# Patient Record
Sex: MALE | Race: WHITE | NOT HISPANIC OR LATINO | Employment: OTHER | URBAN - METROPOLITAN AREA
[De-identification: names, ages, dates, MRNs, and addresses within clinical notes are randomized per-mention and may not be internally consistent; named-entity substitution may affect disease eponyms.]

---

## 2019-12-06 ENCOUNTER — HOSPITAL ENCOUNTER (INPATIENT)
Facility: HOSPITAL | Age: 59
LOS: 11 days | Discharge: NURSING, REHAB OR LONG-TERM CARE | DRG: 023 | End: 2019-12-17
Attending: EMERGENCY MEDICINE | Admitting: ANESTHESIOLOGY
Payer: MEDICAID

## 2019-12-06 DIAGNOSIS — I63.9 STROKE: ICD-10-CM

## 2019-12-06 DIAGNOSIS — J39.2 OROPHARYNGEAL MASS: Primary | ICD-10-CM

## 2019-12-06 DIAGNOSIS — I63.40 EMBOLIC CEREBRAL INFARCTION: ICD-10-CM

## 2019-12-06 DIAGNOSIS — I63.412 EMBOLIC STROKE INVOLVING LEFT MIDDLE CEREBRAL ARTERY: ICD-10-CM

## 2019-12-06 PROBLEM — G93.6 CYTOTOXIC CEREBRAL EDEMA: Status: ACTIVE | Noted: 2019-12-06

## 2019-12-06 PROBLEM — E78.2 MIXED HYPERLIPIDEMIA: Status: ACTIVE | Noted: 2019-12-06

## 2019-12-06 PROBLEM — R47.01 APHASIA: Status: ACTIVE | Noted: 2019-12-06

## 2019-12-06 LAB
ALBUMIN SERPL BCP-MCNC: 3.5 G/DL (ref 3.5–5.2)
ALP SERPL-CCNC: 62 U/L (ref 55–135)
ALT SERPL W/O P-5'-P-CCNC: 12 U/L (ref 10–44)
ANION GAP SERPL CALC-SCNC: 9 MMOL/L (ref 8–16)
ASCENDING AORTA: 3.06 CM
AST SERPL-CCNC: 17 U/L (ref 10–40)
AV INDEX (PROSTH): 0.64
AV MEAN GRADIENT: 20 MMHG
AV PEAK GRADIENT: 35 MMHG
AV VALVE AREA: 2.1 CM2
AV VELOCITY RATIO: 0.55
BASOPHILS # BLD AUTO: 0.06 K/UL (ref 0–0.2)
BASOPHILS NFR BLD: 0.5 % (ref 0–1.9)
BILIRUB SERPL-MCNC: 0.6 MG/DL (ref 0.1–1)
BUN SERPL-MCNC: 15 MG/DL (ref 6–20)
BUN SERPL-MCNC: 17 MG/DL (ref 6–30)
CALCIUM SERPL-MCNC: 9.1 MG/DL (ref 8.7–10.5)
CHLORIDE SERPL-SCNC: 103 MMOL/L (ref 95–110)
CHLORIDE SERPL-SCNC: 107 MMOL/L (ref 95–110)
CHOLEST SERPL-MCNC: 218 MG/DL (ref 120–199)
CHOLEST/HDLC SERPL: 4.1 {RATIO} (ref 2–5)
CO2 SERPL-SCNC: 24 MMOL/L (ref 23–29)
CREAT SERPL-MCNC: 0.9 MG/DL (ref 0.5–1.4)
CREAT SERPL-MCNC: 0.9 MG/DL (ref 0.5–1.4)
CV ECHO LV RWT: 0.37 CM
DIFFERENTIAL METHOD: ABNORMAL
DOP CALC AO PEAK VEL: 2.94 M/S
DOP CALC AO VTI: 60.74 CM
DOP CALC LVOT AREA: 3.3 CM2
DOP CALC LVOT DIAMETER: 2.04 CM
DOP CALC LVOT PEAK VEL: 1.63 M/S
DOP CALC LVOT STROKE VOLUME: 127.67 CM3
DOP CALCLVOT PEAK VEL VTI: 39.08 CM
E WAVE DECELERATION TIME: 280.68 MSEC
E/A RATIO: 0.87
E/E' RATIO: 11.2 M/S
ECHO LV POSTERIOR WALL: 1 CM (ref 0.6–1.1)
EOSINOPHIL # BLD AUTO: 0 K/UL (ref 0–0.5)
EOSINOPHIL NFR BLD: 0.2 % (ref 0–8)
ERYTHROCYTE [DISTWIDTH] IN BLOOD BY AUTOMATED COUNT: 13.9 % (ref 11.5–14.5)
EST. GFR  (AFRICAN AMERICAN): >60 ML/MIN/1.73 M^2
EST. GFR  (NON AFRICAN AMERICAN): >60 ML/MIN/1.73 M^2
ESTIMATED AVG GLUCOSE: 117 MG/DL (ref 68–131)
FRACTIONAL SHORTENING: 11 % (ref 28–44)
GLUCOSE SERPL-MCNC: 107 MG/DL (ref 70–110)
GLUCOSE SERPL-MCNC: 113 MG/DL (ref 70–110)
HBA1C MFR BLD HPLC: 5.7 % (ref 4–5.6)
HCT VFR BLD AUTO: 40.3 % (ref 40–54)
HCT VFR BLD CALC: 40 %PCV (ref 36–54)
HDLC SERPL-MCNC: 53 MG/DL (ref 40–75)
HDLC SERPL: 24.3 % (ref 20–50)
HGB BLD-MCNC: 13.7 G/DL (ref 14–18)
IMM GRANULOCYTES # BLD AUTO: 0.06 K/UL (ref 0–0.04)
IMM GRANULOCYTES NFR BLD AUTO: 0.5 % (ref 0–0.5)
INR PPP: 0.9 (ref 0.8–1.2)
INTERVENTRICULAR SEPTUM: 1.1 CM (ref 0.6–1.1)
LA MAJOR: 6.19 CM
LA MINOR: 4.36 CM
LA WIDTH: 5.07 CM
LDLC SERPL CALC-MCNC: 144 MG/DL (ref 63–159)
LEFT ATRIUM SIZE: 4.09 CM
LEFT ATRIUM VOLUME: 90.18 CM3
LEFT INTERNAL DIMENSION IN SYSTOLE: 4.8 CM (ref 2.1–4)
LEFT VENTRICLE DIASTOLIC VOLUME: 132.21 ML
LEFT VENTRICLE SYSTOLIC VOLUME: 54.99 ML
LEFT VENTRICULAR INTERNAL DIMENSION IN DIASTOLE: 5.4 CM (ref 3.5–6)
LEFT VENTRICULAR MASS: 220.59 G
LV LATERAL E/E' RATIO: 10.5 M/S
LV SEPTAL E/E' RATIO: 12 M/S
LYMPHOCYTES # BLD AUTO: 2.4 K/UL (ref 1–4.8)
LYMPHOCYTES NFR BLD: 19.2 % (ref 18–48)
MCH RBC QN AUTO: 30.9 PG (ref 27–31)
MCHC RBC AUTO-ENTMCNC: 34 G/DL (ref 32–36)
MCV RBC AUTO: 91 FL (ref 82–98)
MONOCYTES # BLD AUTO: 0.5 K/UL (ref 0.3–1)
MONOCYTES NFR BLD: 4 % (ref 4–15)
MV PEAK A VEL: 0.97 M/S
MV PEAK E VEL: 0.84 M/S
NEUTROPHILS # BLD AUTO: 9.4 K/UL (ref 1.8–7.7)
NEUTROPHILS NFR BLD: 75.6 % (ref 38–73)
NONHDLC SERPL-MCNC: 165 MG/DL
NRBC BLD-RTO: 0 /100 WBC
PLATELET # BLD AUTO: 250 K/UL (ref 150–350)
PMV BLD AUTO: 10.2 FL (ref 9.2–12.9)
POC IONIZED CALCIUM: 1.11 MMOL/L (ref 1.06–1.42)
POC TCO2 (MEASURED): 26 MMOL/L (ref 23–29)
POCT GLUCOSE: 102 MG/DL (ref 70–110)
POCT GLUCOSE: 103 MG/DL (ref 70–110)
POTASSIUM BLD-SCNC: 4.2 MMOL/L (ref 3.5–5.1)
POTASSIUM SERPL-SCNC: 4.2 MMOL/L (ref 3.5–5.1)
PROT SERPL-MCNC: 7.1 G/DL (ref 6–8.4)
PROTHROMBIN TIME: 9.7 SEC (ref 9–12.5)
RA MAJOR: 5.35 CM
RA PRESSURE: 3 MMHG
RA WIDTH: 3.31 CM
RBC # BLD AUTO: 4.43 M/UL (ref 4.6–6.2)
SAMPLE: ABNORMAL
SINUS: 2.91 CM
SODIUM BLD-SCNC: 139 MMOL/L (ref 136–145)
SODIUM SERPL-SCNC: 140 MMOL/L (ref 136–145)
STJ: 3.18 CM
TDI LATERAL: 0.08 M/S
TDI SEPTAL: 0.07 M/S
TDI: 0.08 M/S
TRICUSPID ANNULAR PLANE SYSTOLIC EXCURSION: 1.93 CM
TRIGL SERPL-MCNC: 105 MG/DL (ref 30–150)
TROPONIN I SERPL DL<=0.01 NG/ML-MCNC: 0.01 NG/ML (ref 0–0.03)
TSH SERPL DL<=0.005 MIU/L-ACNC: 0.98 UIU/ML (ref 0.4–4)
WBC # BLD AUTO: 12.41 K/UL (ref 3.9–12.7)

## 2019-12-06 PROCEDURE — 71000033 HC RECOVERY, INTIAL HOUR: Performed by: PSYCHIATRY & NEUROLOGY

## 2019-12-06 PROCEDURE — 99233 PR SUBSEQUENT HOSPITAL CARE,LEVL III: ICD-10-PCS | Mod: ,,, | Performed by: PSYCHIATRY & NEUROLOGY

## 2019-12-06 PROCEDURE — 25500020 PHARM REV CODE 255: Performed by: EMERGENCY MEDICINE

## 2019-12-06 PROCEDURE — 25000003 PHARM REV CODE 250: Performed by: RADIOLOGY

## 2019-12-06 PROCEDURE — 99291 CRITICAL CARE FIRST HOUR: CPT | Mod: ,,, | Performed by: EMERGENCY MEDICINE

## 2019-12-06 PROCEDURE — 96374 THER/PROPH/DIAG INJ IV PUSH: CPT

## 2019-12-06 PROCEDURE — 99291 CRITICAL CARE FIRST HOUR: CPT | Mod: 25

## 2019-12-06 PROCEDURE — 84443 ASSAY THYROID STIM HORMONE: CPT

## 2019-12-06 PROCEDURE — 82962 GLUCOSE BLOOD TEST: CPT

## 2019-12-06 PROCEDURE — 80053 COMPREHEN METABOLIC PANEL: CPT

## 2019-12-06 PROCEDURE — 80061 LIPID PANEL: CPT

## 2019-12-06 PROCEDURE — 80047 BASIC METABLC PNL IONIZED CA: CPT

## 2019-12-06 PROCEDURE — 99291 PR CRITICAL CARE, E/M 30-74 MINUTES: ICD-10-PCS | Mod: ,,, | Performed by: EMERGENCY MEDICINE

## 2019-12-06 PROCEDURE — 93005 ELECTROCARDIOGRAM TRACING: CPT

## 2019-12-06 PROCEDURE — 94761 N-INVAS EAR/PLS OXIMETRY MLT: CPT

## 2019-12-06 PROCEDURE — 99233 SBSQ HOSP IP/OBS HIGH 50: CPT | Mod: ,,, | Performed by: PSYCHIATRY & NEUROLOGY

## 2019-12-06 PROCEDURE — 84484 ASSAY OF TROPONIN QUANT: CPT

## 2019-12-06 PROCEDURE — 85610 PROTHROMBIN TIME: CPT

## 2019-12-06 PROCEDURE — 99222 1ST HOSP IP/OBS MODERATE 55: CPT | Mod: ,,, | Performed by: PSYCHIATRY & NEUROLOGY

## 2019-12-06 PROCEDURE — 99222 PR INITIAL HOSPITAL CARE,LEVL II: ICD-10-PCS | Mod: ,,, | Performed by: PSYCHIATRY & NEUROLOGY

## 2019-12-06 PROCEDURE — 93010 EKG 12-LEAD: ICD-10-PCS | Mod: ,,, | Performed by: INTERNAL MEDICINE

## 2019-12-06 PROCEDURE — 82962 GLUCOSE BLOOD TEST: CPT | Performed by: PSYCHIATRY & NEUROLOGY

## 2019-12-06 PROCEDURE — 85025 COMPLETE CBC W/AUTO DIFF WBC: CPT

## 2019-12-06 PROCEDURE — 63600175 PHARM REV CODE 636 W HCPCS: Performed by: RADIOLOGY

## 2019-12-06 PROCEDURE — 93010 ELECTROCARDIOGRAM REPORT: CPT | Mod: ,,, | Performed by: INTERNAL MEDICINE

## 2019-12-06 PROCEDURE — 12000002 HC ACUTE/MED SURGE SEMI-PRIVATE ROOM

## 2019-12-06 PROCEDURE — 71000039 HC RECOVERY, EACH ADD'L HOUR: Performed by: PSYCHIATRY & NEUROLOGY

## 2019-12-06 PROCEDURE — 83036 HEMOGLOBIN GLYCOSYLATED A1C: CPT

## 2019-12-06 RX ORDER — SODIUM CHLORIDE 0.9 % (FLUSH) 0.9 %
10 SYRINGE (ML) INJECTION
Status: DISCONTINUED | OUTPATIENT
Start: 2019-12-06 | End: 2019-12-17 | Stop reason: HOSPADM

## 2019-12-06 RX ORDER — POTASSIUM CHLORIDE 7.45 MG/ML
60 INJECTION INTRAVENOUS
Status: DISCONTINUED | OUTPATIENT
Start: 2019-12-06 | End: 2019-12-09

## 2019-12-06 RX ORDER — SODIUM CHLORIDE 0.9 % (FLUSH) 0.9 %
10 SYRINGE (ML) INJECTION
Status: DISCONTINUED | OUTPATIENT
Start: 2019-12-06 | End: 2019-12-06

## 2019-12-06 RX ORDER — NICARDIPINE HYDROCHLORIDE 0.2 MG/ML
5 INJECTION INTRAVENOUS CONTINUOUS
Status: DISCONTINUED | OUTPATIENT
Start: 2019-12-06 | End: 2019-12-08

## 2019-12-06 RX ORDER — FENTANYL CITRATE 50 UG/ML
INJECTION, SOLUTION INTRAMUSCULAR; INTRAVENOUS CODE/TRAUMA/SEDATION MEDICATION
Status: COMPLETED | OUTPATIENT
Start: 2019-12-06 | End: 2019-12-06

## 2019-12-06 RX ORDER — MAGNESIUM SULFATE HEPTAHYDRATE 40 MG/ML
2 INJECTION, SOLUTION INTRAVENOUS
Status: DISCONTINUED | OUTPATIENT
Start: 2019-12-06 | End: 2019-12-09

## 2019-12-06 RX ORDER — LABETALOL HCL 20 MG/4 ML
10 SYRINGE (ML) INTRAVENOUS
Status: DISCONTINUED | OUTPATIENT
Start: 2019-12-06 | End: 2019-12-06

## 2019-12-06 RX ORDER — POTASSIUM CHLORIDE 7.45 MG/ML
40 INJECTION INTRAVENOUS
Status: DISCONTINUED | OUTPATIENT
Start: 2019-12-06 | End: 2019-12-09

## 2019-12-06 RX ORDER — IODIXANOL 320 MG/ML
100 INJECTION, SOLUTION INTRAVASCULAR
Status: COMPLETED | OUTPATIENT
Start: 2019-12-06 | End: 2019-12-06

## 2019-12-06 RX ORDER — MAGNESIUM SULFATE HEPTAHYDRATE 40 MG/ML
4 INJECTION, SOLUTION INTRAVENOUS
Status: DISCONTINUED | OUTPATIENT
Start: 2019-12-06 | End: 2019-12-09

## 2019-12-06 RX ORDER — POTASSIUM CHLORIDE 7.45 MG/ML
80 INJECTION INTRAVENOUS
Status: DISCONTINUED | OUTPATIENT
Start: 2019-12-06 | End: 2019-12-09

## 2019-12-06 RX ORDER — AMOXICILLIN 250 MG
1 CAPSULE ORAL 2 TIMES DAILY
Status: DISCONTINUED | OUTPATIENT
Start: 2019-12-07 | End: 2019-12-17 | Stop reason: HOSPADM

## 2019-12-06 RX ADMIN — NICARDIPINE HYDROCHLORIDE 5 MG/HR: 0.2 INJECTION, SOLUTION INTRAVENOUS at 03:12

## 2019-12-06 RX ADMIN — FENTANYL CITRATE 50 MCG: 50 INJECTION, SOLUTION INTRAMUSCULAR; INTRAVENOUS at 08:12

## 2019-12-06 RX ADMIN — NICARDIPINE HYDROCHLORIDE 5 MG/HR: 0.2 INJECTION, SOLUTION INTRAVENOUS at 11:12

## 2019-12-06 RX ADMIN — IODIXANOL 30 ML: 320 INJECTION, SOLUTION INTRAVASCULAR at 09:12

## 2019-12-06 NOTE — H&P
Inpatient Radiology Pre-procedure Note    History of Present Illness:  Bijan Treadwell is a 60 y.o. male who presents for Left MCA Acute ischemic stroke and CTA showing left M1 occlusion.  Admission H&P reviewed.  No past medical history on file.  No past surgical history on file.    Review of Systems:   As documented in primary team H&P    Home Meds:   Prior to Admission medications    Not on File     Scheduled Meds:   Continuous Infusions:   PRN Meds:  Anticoagulants/Antiplatelets: no anticoagulation    Allergies: Review of patient's allergies indicates:  Allergies not on file  Sedation Hx: have not been any systemic reactions    Labs:  No results for input(s): INR in the last 168 hours.    Invalid input(s):  PT,  PTT    Recent Labs   Lab 12/06/19 0813   HCT 40    No results for input(s): GLU, NA, K, CL, CO2, BUN, CREATININE, CALCIUM, MG, ALT, AST, ALBUMIN, BILITOT, BILIDIR in the last 168 hours.      Vitals:  Pulse: 69 (12/06/19 0821)  Resp: (!) 22 (12/06/19 0803)  BP: (!) 150/82 (12/06/19 0822)  SpO2: 96 % (12/06/19 0821)     Physical Exam:  ASA: 3  Mallampati: 2    Right sided weakness and aphasia     Plan: left MCA M1 thrombectomy   Sedation Plan: Moderate sedation    Chip Maier MD  NeuroIR fellow.

## 2019-12-06 NOTE — PLAN OF CARE
Pt arrived to ir 190 for left MCA M1 thrombectomy . Pt oriented to unit and staff. Plan of care reviewed with patient, patient verbalizes understanding. Comfort measures utilized. Pt safely transferred from stretcher to procedural table. Fall risk reviewed with patient, fall risk interventions maintained. Safety strap applied, positioner pillows utilized to minimize pressure points. Blankets applied. Pt prepped and draped utilizing standard sterile technique. Patient placed on continuous monitoring, as required by sedation policy. Timeouts completed utilizing standard universal time-out, per department and facility policy. RN to remain at bedside, continuous monitoring maintained. Pt resting comfortably. Denies pain/discomfort. Will continue to monitor. See flow sheets for monitoring, medication administration, and updates.

## 2019-12-06 NOTE — HPI
Patient is a 60 year old male with unknown medical history who was admitted for L MCA presented to [place] ED after he fell from a chair at a bar. He was noted to have aphasia and right sided weakness. Workup was significant for L MCA occlusion (per notes) and he was [place] [facility] [place] intervention. Last known normal was 23:30 on 12/5, no tPA was given.. He is s/p thombectomy, TICI 2b. He is being admitted to [facility] for post procedure management.

## 2019-12-06 NOTE — ED TRIAGE NOTES
Coming from [place] with MCA occlusion. LSN last night at 1130pm. Fell off chair at bar after drinking ETOH. Pt with garbled speech, R side weakness, not following commands.

## 2019-12-06 NOTE — H&P
[facility]  Neurocritical Care  History & Physical    Admit Date: 12/6/2019  Service Date: 12/06/2019  Length of Stay: 0    Subjective:     Chief Complaint: Embolic stroke involving left middle cerebral artery    History of Present Illness: Patient is a 60 year old male with unknown medical history who presented to [place] ED after he fell from a chair at a bar. He was noted to have aphasia and right sided weakness. Workup was significant for L MCA occlusion (per notes) and he was transported to [facility] for possible intervention. Last known normal was 23:30 on 12/5, no tPA was given.. He is s/p thombectomy, TICI 2b. He is being admitted to [facility] for post procedure management.     No past medical history on file.  No past surgical history on file.   No current facility-administered medications on file prior to encounter.      No current outpatient medications on file prior to encounter.      Allergies: Patient has no allergy information on record.    No family history on file.  Social History     Tobacco Use    Smoking status: Not on file   Substance Use Topics    Alcohol use: Not on file    Drug use: Not on file     Review of Systems   Unable to perform ROS: Other (Aphasia)     Objective:     Vitals:    Temp: 98 °F (36.7 °C)  Pulse: 76  Rhythm: (P) normal sinus rhythm  BP: 136/71  MAP (mmHg): 90  Resp: 20  SpO2: 97 %  O2 Device (Oxygen Therapy): room air    Temp  Min: 97.8 °F (36.6 °C)  Max: 98 °F (36.7 °C)  Pulse  Min: 65  Max: 82  BP  Min: 116/73  Max: 165/96  MAP (mmHg)  Min: 87  Max: 109  Resp  Min: 13  Max: 22  SpO2  Min: 94 %  Max: 100 %    No intake/output data recorded.   Unmeasured Output  Urine Occurrence: 2       Physical Exam   Constitutional: No distress.   HENT:   Head: Normocephalic.   Eyes: Pupils are equal, round, and reactive to light. EOM are normal. No scleral icterus.   Neck: Neck supple.   Cardiovascular: Normal rate and regular rhythm.   Pulmonary/Chest: He is in  respiratory distress.   Abdominal: Soft. There is no tenderness.   Neurological: He exhibits normal muscle tone. GCS eye subscore is 4. GCS verbal subscore is 3. GCS motor subscore is 6.   Alert.   Motor: LUE 5/5, LLE 5/5. RUE 4/5, RLE 3/5  Expressive aphasia.   Receptive aphasia - follow commands intermittently   Unable to test orientation or sensation due to aphasia.    Skin: He is not diaphoretic.   Nursing note and vitals reviewed.      Assessment/Plan:     Neuro  * Embolic stroke involving left middle cerebral artery  60 year old gentleman with unknown medical history admitted for L MCA occlusion s/p thrombectomy with TICI2b flow.   - Neurochecks q1h  - Vitals q1h  - I/O  - SBP <140  - MRI head within 24 hours post intervention  - TSH wnl  - Hgb A1c pending  - Echo  - PT/OT/SLP         Activity Orders          Diet NPO: NPO starting at 12/06 1114        Full Code    Mirna Celaya MD  Neurocritical Care  [facility]

## 2019-12-06 NOTE — NURSING TRANSFER
PACU  received a phone call of patients' friend (Tae Worley); left their contact information. (Tae Worley; [phone]). RN tried calling the number however was unsuccessful at reaching anyone.

## 2019-12-06 NOTE — SUBJECTIVE & OBJECTIVE
No past medical history on file.  No past surgical history on file.   No current facility-administered medications on file prior to encounter.      No current outpatient medications on file prior to encounter.      Allergies: Patient has no allergy information on record.    No family history on file.  Social History     Tobacco Use    Smoking status: Not on file   Substance Use Topics    Alcohol use: Not on file    Drug use: Not on file     Review of Systems   Unable to perform ROS: Other (Aphasia)     Objective:     Vitals:    Temp: 98 °F (36.7 °C)  Pulse: 76  Rhythm: (P) normal sinus rhythm  BP: 136/71  MAP (mmHg): 90  Resp: 20  SpO2: 97 %  O2 Device (Oxygen Therapy): room air    Temp  Min: 97.8 °F (36.6 °C)  Max: 98 °F (36.7 °C)  Pulse  Min: 65  Max: 82  BP  Min: 116/73  Max: 165/96  MAP (mmHg)  Min: 87  Max: 109  Resp  Min: 13  Max: 22  SpO2  Min: 94 %  Max: 100 %    No intake/output data recorded.   Unmeasured Output  Urine Occurrence: 2       Physical Exam   Constitutional: No distress.   HENT:   Head: Normocephalic.   Eyes: Pupils are equal, round, and reactive to light. EOM are normal. No scleral icterus.   Neck: Neck supple.   Cardiovascular: Normal rate and regular rhythm.   Pulmonary/Chest: He is in respiratory distress.   Abdominal: Soft. There is no tenderness.   Neurological: He exhibits normal muscle tone. GCS eye subscore is 4. GCS verbal subscore is 3. GCS motor subscore is 6.   Alert.   Motor: LUE 5/5, LLE 5/5. RUE 4/5, RLE 3/5  Expressive aphasia.   Receptive aphasia - follow commands intermittently   Unable to test orientation or sensation due to aphasia.    Skin: He is not diaphoretic.   Nursing note and vitals reviewed.

## 2019-12-06 NOTE — ASSESSMENT & PLAN NOTE
60 year old gentleman with unknown medical history admitted for L MCA occlusion s/p thrombectomy with TICI2b flow.   - Neurochecks q1h  - Vitals q1h  - I/O  - SBP <140  - MRI head within 24 hours post intervention  - TSH wnl  - Hgb A1c pending  - Echo  - PT/OT/SLP

## 2019-12-06 NOTE — CODE/ RAPID DOCUMENTATION
RAPID RESPONSE NURSE IR NOTE     Admit Date: 2019  LOS: 0  Code Status: No Order   Date of Consult: 2019  : 1959  Age: 60 y.o.  Weight:   Wt Readings from Last 1 Encounters:   No data found for Wt     Sex: male  Race: Data Unavailable   Bed: RODERICK/RODERICK:   MRN: [id number]  Time Rapid Response Team notified: 0820  Time Rapid Response Team at Bedside: 0835  Time Rapid Response Team left Bedside:   Was the patient discharged from an ICU this admission?   no  Was the patient discharged from a PACU within last 24 hours?  no  Did the patient receive conscious sedation/general anesthesia within last 24 hours?  no  Was the patient in the ED within the past 24 hours?  no  Was the patient started on NIPPV within the past 24 hours?  no  Did this progress into an ARC or CPA:  no  Attending Physician: Raphael Will MD  Primary Service: Networked reference to record PCT   Consult Requested By: Raphael Will MD     SITUATION     Reason for Call: IR thrombectomy    BACKGROUND     Why is the patient in the hospital?: <principal problem not specified>  Patient has no past medical history on file.    ASSESSMENT     Last know well time:     Glucose time: 08   Glucose result: 113    Time Stroke Code initiated:   Stroke team Arrival time:  Stroke Code activation triggers:     Time arrived at CT: 0805  Time CT completed: 818    VAN positive or negative: positive  VAN Test    tPA decision: no  tPA bolus (mg and time):  tPA infusion (mg and time):  tPA end time:    IR decision: yes  IR arrival: 0839  IR end time: 0910    BP parameters: Maintain SBP <140, DBP <95, notify MD for SBP <100    FREQUENT DOCUMENTION     Initial NIH:     Procedure end time: 0910    Post procedure VS, Neuro and groin site checks: Q15m x 2H, Q30min x 6H, then hourly    See flowsheets for further documentation.    FOLLOW-UP/CONTINGENCY PLAN     Call the Rapid Response Nurse, Oscra Pabon RN at [phone] for additional questions or  concerns.    PHYSICIAN ESCALATION     Vascular Neurology provider you spoke with: Dr. Chip Maier    Disposition: Tx to [facility], bed PACU 5.

## 2019-12-06 NOTE — HPI
Patient is a 58 yo male  with unknown past medical history who is being evaluated by the Vascular Neurology service after developing AMS. Pt was LKN at approximately 11:30 pm on 12/5 when he fell from a chair while drinking at a bar. Patient transported to [place]. CTA at OSH revealed L MCA proximal occlusion. [facility] unable to complete intervention. Patient transported by air to [facility] ED for possible intervention. VAN + on arrival. CT aspects ~7.Patient taken to IR and is now s/p successful thrombectomy with TICI 2b flow. Patient monitored for past 24hr in PACU.     Patient aphasic, family not at beside. History gathered from EMS and chart.

## 2019-12-06 NOTE — HOSPITAL COURSE
12/6: s/p thombectomy TICI 2b.   12/07/2019 heme conversion on repeat scan  12/08/2019 NAD overnight. Stable for transfer

## 2019-12-06 NOTE — PROGRESS NOTES
Procedure complete. Patient tolerated well. Manual pressure held to right groin site x 15 minutes. Patient to remain on bedrest x6 hours with hob flat.Dressing clean dry and intact to site. Patient transferred to pacu accompanied per critical care nurse. Report given.

## 2019-12-06 NOTE — PROCEDURES
Neurointerventional Radiology Post-Procedure Note    Pre Op Diagnosis: Left MCA M1 occlusion     Post Op Diagnosis: Left MCA M1 occlusion s/p thrombectomy    Procedure: Cerebral angiogram    Procedure performed by: Esvin CARRERA, Yovanny Maier MD.    Written Informed Consent Obtained: Yes    Specimen Removed: NO    Estimated Blood Loss: less than 50     Procedure report:     A 8F sheath was placed into the right femoral artery and a Neuronmax over Jeff catheter was advanced into the aortic arch.  The left ICA arteries were subselected and angiography of the brain was performed after injection into each of these vessels. NeuronMax 088 over Jet flex 7 and velocity microcatheter and fathom wire for thrombectomy aat left M1.     Preliminary interpretation: Left MCA M1 occlusion treated with aspiration thrombectomy with TICI2b reperfusion .  Please see Imaging report for full details.    Stick time - 8:46 am   First pass - 8:59 am TICI2b    A right femoral artery angiogram was performed, the sheath removed and hemostasis achieved by holding manual pressure.  No hematoma was present at the time of hemostasis.    The patient tolerated the procedure well.       Chip Maier MD  NeuroInterventional Radiology Fellow

## 2019-12-06 NOTE — NURSING
Called to bedside to perform Bubble study. Pt unresponsive.  Patient identified by 2 identifiers on armband, unable to assess allergies, no family available.  20g IV in place to Rt AC, flushed w/ 10cc NS.  Bubble study performed with manual Valsalva & echo images obtained.  IV flushed post bubble.  Pt tolerated procedure well.

## 2019-12-06 NOTE — CONSULTS
[facility]  Vascular Neurology  Comprehensive Stroke Center  Consult Note    Inpatient consult to Vascular (Stroke) Neurology  Consult performed by: Anderson Engle NP  Consult ordered by: Raphael Will MD        Assessment/Plan:     Patient is a 60 y.o. year old male with:    * Embolic stroke involving left middle cerebral artery  60 y.o. yo male with unknown past medical history, who presented to [place] with AMS s/p fall. LKN ~1130 pm pn 12/5. CTA completed at OSH revealed left MCA proximal occlusion. Patient transferred to [facility] for possible intervention. Patient VAN + on arrival to [facility]. CTH with ASPECTS ~7. Patient taken to IR and is now s/p successful thrombectomy with TICI 2b flow. Admitted to [facility] for close monitoring/higher level of care      Antithrombotics for secondary stroke prevention: Antiplatelets: None: hold     Statins for secondary stroke prevention and hyperlipidemia, if present:   Statins: Atorvastatin- 40 mg daily    Aggressive risk factor modification: HLD,       Rehab efforts: The patient has been evaluated by a stroke team provider and the therapy needs have been fully considered based off the presenting complaints and exam findings. The following therapy evaluations are needed: PT evaluate and treat, OT evaluate and treat, SLP evaluate and treat, PM&R evaluate for appropriate placement    Diagnostics ordered/pending: MRI head without contrast to assess brain parenchyma, TTE to assess cardiac function/status , TSH to assess thyroid function    VTE prophylaxis: Heparin 5000 units SQ every 8 hours    BP parameters: Infarct: Post sucessful thrombectomy, SBP <140        Mixed hyperlipidemia  -stroke risk factor         Recommend Atorvastatin 40 mg         STROKE DOCUMENTATION     Acute Stroke Times   Last Known Normal Date: 12/05/19  Last Known Normal Time: 2330  Stroke Team Called Date: 12/06/19  Stroke Team Called Time: 0800  Stroke Team Arrival  Date: 12/06/19  Stroke Team Arrival Time: 0804  CT Interpretation Time: 0810  Decision to Treat Time for Alteplase: (N/A)    NIH Scale:  1a. Level of Consciousness: 0-->Alert, keenly responsive  1b. LOC Questions: 2-->Answers neither question correctly  1c. LOC Commands: 2-->Performs neither task correctly  2. Best Gaze: 1-->Partial gaze palsy, gaze is abnormal in one or both eyes, but forced deviation or total gaze paresis is not present  3. Visual: 0-->No visual loss  4. Facial Palsy: 0-->Normal symmetrical movements  5a. Motor Arm, Left: 0-->No drift, limb holds 90 (or 45) degrees for full 10 secs  5b. Motor Arm, Right: 1-->Drift, limb holds 90 (or 45) degrees, but drifts down before full 10 secs, does not hit bed or other support  6a. Motor Leg, Left: 0-->No drift, leg holds 30 degree position for full 5 secs  6b. Motor Leg, Right: 1-->Drift, leg falls by the end of the 5-sec period but does not hit bed  7. Limb Ataxia: 0-->Absent  8. Sensory: 2-->Severe to total sensory loss, patient is not aware of being touched in the face, arm, and leg  9. Best Language: 2-->Severe aphasia, all communication is through fragmentary expression, great need for inference, questioning, and guessing by the listener. Range of information that can be exchanged is limited, listener carries burden of. . . (see row details)  10. Dysarthria: 2-->Severe dysarthria, patients speech is so slurred as to be unintelligible in the absence of or out of proportion to any dysphasia, or is mute/anarthric  11. Extinction and Inattention (formerly Neglect): 2-->Profound vickey-inattention/extinction more than 1 modality  Total (NIH Stroke Scale): 15    Modified Sharmaine Score: (unknown)  Avery Coma Scale:    ABCD2 Score:    [id number] Score:   HAS -BLED Score:   ICH Score:   Hunt & Cook Classification:       Thrombolysis Candidate? No, Out of window     Delays to Thrombolysis?  No    Interventional Revascularization Candidate?   Is the patient  eligible for mechanical endovascular reperfusion (DIANA)?  Yes      Hemorrhagic change of an Ischemic Stroke: Does this patient have an ischemic stroke with hemorrhagic changes? No     Subjective:     History of Present Illness:  Bijan Arizmendi is a 60 y.o. male with unknown past medical history who is being evaluated by the Vascular Neurology service after developing AMS. Pt was LKN at approximately 11:30 pm on 12/5 when he fell from a chair while drinking at a bar. Patient transported to [place]. CTA at OSH revealed L MCA proximal occlusion. [facility] unable to complete intervention. Patient transported by air to [facility] ED for possible intervention. VAN + on arrival. CT aspects ~7.Patient taken to IR and is now s/p successful thrombectomy with TICI 2b flow. Patient to be admitted to [facility].     Patient aphasic, family not at beside. History gathered from EMS and chart.        No past medical history on file.  No past surgical history on file.  No family history on file.  Social History     Tobacco Use    Smoking status: Not on file   Substance Use Topics    Alcohol use: Not on file    Drug use: Not on file     Review of patient's allergies indicates:  Not on File    Medications: I have reviewed the current medication administration record.      (Not in a hospital admission)    Review of Systems   Constitutional: Positive for activity change.   Eyes: Negative for visual disturbance.   Respiratory: Negative for shortness of breath and wheezing.    Cardiovascular: Negative for chest pain.   Gastrointestinal: Negative for diarrhea, nausea and vomiting.   Skin: Negative for color change and pallor.   Neurological: Positive for speech difficulty, weakness and numbness. Negative for facial asymmetry.   Psychiatric/Behavioral: Positive for confusion and decreased concentration.     Objective:     Vital Signs (Most Recent):  Temp: 97.8 °F (36.6 °C) (12/06/19 0953)  Pulse: 78 (12/06/19 1045)  Resp: 13 (12/06/19  1045)  BP: 128/60 (12/06/19 1045)  SpO2: 95 % (12/06/19 1045)    Vital Signs Range (Last 24H):  Temp:  [97.8 °F (36.6 °C)]   Pulse:  [65-82]   Resp:  [13-22]   BP: (116-165)/(60-96)   SpO2:  [94 %-100 %]     Physical Exam   Constitutional: He appears well-developed and well-nourished.   HENT:   Head: Normocephalic.   Eyes: Pupils are equal, round, and reactive to light.   Left gaze preference    Neck: Full passive range of motion without pain.   Cardiovascular: Normal rate.   Pulmonary/Chest: Effort normal.   Abdominal: Soft.   Musculoskeletal:   Right sided weakness   Skin: Abrasion (right side of head) noted.   Psychiatric: His speech is slurred.       Neurological Exam:   LOC: alert  Attention Span: Good   Language: Expressive aphasia, Receptive aphasia, not following commands consistently, not able to answer simple questions  Articulation: Dysarthria  Orientation: Untestable due to severe aphasia   Visual Fields: Full  EOM (CN III, IV, VI): Gaze preference  left  Pupils (CN II, III): PERRL  Facial Sensation (CN V): Normal  Reflexes: 2+ throughout  Motor: Arm left  Normal 5/5  Leg left  Normal 5/5  Arm right  Paresis: 4/5  Leg right Paresis: 4/5  Cebellar: EVAN- not following commands consistently  Sensation: Tactile extinction to bilateral simultaneous stimulation   Tone: Normal tone throughout      Laboratory:  CMP:   Recent Labs   Lab 12/06/19  0817   CALCIUM 9.1   ALBUMIN 3.5   PROT 7.1      K 4.2   CO2 24      BUN 15   CREATININE 0.9   ALKPHOS 62   ALT 12   AST 17   BILITOT 0.6     CBC:   Recent Labs   Lab 12/06/19  0817   WBC 12.41   RBC 4.43*   HGB 13.7*   HCT 40.3      MCV 91   MCH 30.9   MCHC 34.0     Lipid Panel:   Recent Labs   Lab 12/06/19  0817   CHOL 218*   LDLCALC 144.0   HDL 53   TRIG 105     Coagulation:   Recent Labs   Lab 12/06/19  0817   INR 0.9     Hgb A1C: No results for input(s): HGBA1C in the last 168 hours.  TSH:   Recent Labs   Lab 12/06/19  0817   TSH 0.976        Diagnostic Results:      Brain imaging:    CT head 12/06/2019    Ill-defined regions of decreased attenuation left MCA territory specifically left insula, left posterior temporoparietal cortex and lateral aspect of the left lentiform nucleus most compatible with acute areas of infarction.  There is no significant mass effect or midline shift.  No evidence for hemorrhagic conversion.    There is slight hyperdensity associated with the intracranial vasculature likely related to recent contrast administration for outside institution angiography  Vessel Imaging:  pending    Cardiac Evaluation:   pending      Anderson Engle NP  Comprehensive Stroke Center  Department of Vascular Neurology   [facility]

## 2019-12-06 NOTE — ED PROVIDER NOTES
Encounter Date: 12/6/2019       History     Chief Complaint   Patient presents with    [facility] transfer     left MCA occlusion from [facility]     Patient is a transfer from [facility] ED as a stroke patient. Presenting for possible intervention. Per report from facility at 0130 he began mumbling and fell out of recliner with a GCS of 13. He was + for ETOH and THC. Proximal L MCA occlusion was noted. He was noted to be following commands but had dysarthria and aphasia with R sided weakness.         Review of patient's allergies indicates:  Allergies not on file  No past medical history on file.  No past surgical history on file.  No family history on file.  Social History     Tobacco Use    Smoking status: Not on file   Substance Use Topics    Alcohol use: Not on file    Drug use: Not on file     Review of Systems   Unable to perform ROS: Mental status change       Physical Exam     Initial Vitals   BP Pulse Resp Temp SpO2   -- -- -- -- --      MAP       --         Physical Exam    Constitutional: He appears well-developed and well-nourished. He is not diaphoretic. No distress.   HENT:   Mouth/Throat: No oropharyngeal exudate.   Right frontal cranial abrasion without bone tenderness involvement     Eyes: Right eye exhibits no discharge. Left eye exhibits no discharge. No scleral icterus.   Neck: Normal range of motion.   Cardiovascular: Normal rate, regular rhythm and normal heart sounds.   No murmur heard.  Pulmonary/Chest: Breath sounds normal. No respiratory distress. He has no wheezes.   Abdominal: Soft. Bowel sounds are normal. He exhibits no distension. There is no tenderness.   Musculoskeletal: He exhibits no edema or tenderness.   Neurological: He is alert. He is disoriented. A cranial nerve deficit is present. GCS eye subscore is 4. GCS verbal subscore is 4. GCS motor subscore is 6.   Not oriented to self, place, time, or president    Skin: Skin is warm and dry. No erythema.         ED Course    Critical Care  Date/Time: 12/6/2019 8:02 AM  Performed by: Raphael Will MD  Authorized by: Raphael Will MD   Direct patient critical care time: 15 minutes  Additional history critical care time: 5 minutes  Ordering / reviewing critical care time: 10 minutes  Documentation critical care time: 5 minutes  Consulting other physicians critical care time: 10 minutes  Total critical care time (exclusive of procedural time) : 45 minutes  Critical care time was exclusive of teaching time.  Critical care was necessary to treat or prevent imminent or life-threatening deterioration of the following conditions: CNS failure or compromise.  Critical care was time spent personally by me on the following activities: development of treatment plan with patient or surrogate, discussions with consultants, interpretation of cardiac output measurements, examination of patient, obtaining history from patient or surrogate, ordering and performing treatments and interventions, ordering and review of laboratory studies, ordering and review of radiographic studies, pulse oximetry and review of old charts.        Labs Reviewed   CBC W/ AUTO DIFFERENTIAL - Abnormal; Notable for the following components:       Result Value    RBC 4.43 (*)     Hemoglobin 13.7 (*)     Gran # (ANC) 9.4 (*)     Immature Grans (Abs) 0.06 (*)     Gran% 75.6 (*)     All other components within normal limits   LIPID PANEL - Abnormal; Notable for the following components:    Cholesterol 218 (*)     All other components within normal limits   ISTAT PROCEDURE - Abnormal; Notable for the following components:    POC Glucose 113 (*)     All other components within normal limits   COMPREHENSIVE METABOLIC PANEL   PROTIME-INR   TSH   TROPONIN I   HEMOGLOBIN A1C   POCT GLUCOSE, HAND-HELD DEVICE   POCT GLUCOSE   POCT GLUCOSE MONITORING CONTINUOUS          Imaging Results          IR Angiogram Carotid Cerebral Bilateral inc Arch (In process)                CT  Head Without Contrast (Final result)  Result time 12/06/19 08:37:11    Final result by Niraj May DO (12/06/19 08:37:11)                 Impression:      Ill-defined regions of decreased attenuation left MCA territory specifically left insula, left posterior temporoparietal cortex and lateral aspect of the left lentiform nucleus most compatible with acute areas of infarction.  There is no significant mass effect or midline shift.  No evidence for hemorrhagic conversion.    There is slight hyperdensity associated with the intracranial vasculature likely related to recent contrast administration for outside institution angiography.    Clinical correlation and further evaluation with MRI as warranted if patient compatible      Electronically signed by: Niraj May DO  Date:    12/06/2019  Time:    08:37             Narrative:    EXAMINATION:  CT HEAD WITHOUT CONTRAST    CLINICAL HISTORY:  Stroke;    TECHNIQUE:  Multiple sequential 5 mm axial images of the head without contrast.  Coronal and sagittal reformatted imaging from the axial acquisition.    COMPARISON:  None    FINDINGS:  There is decreased attenuation with slight sulcal effacement within the left anterior temporal cortex superiorly as well as decreased attenuation involving the insular ribbon and lateral margin of the lentiform nucleus, there is additional component in the left posterior temporal cortex.  Overall configuration concerning for moderate developing left MCA territory infarction.  There is localized mass effect with partial compression left lateral ventricle without midline shift or hydrocephalus.  There is no evidence for hemorrhagic conversion.    There is subtle intermediate density associated with the left MCA within the sylvian fissure anteriorly which may be related to recent circulating contrast from angiography with additional hyperdensity within the vasculature elsewhere.  Case discussed with Dr. Gaona on 12/06/2019 at 08:35.                                  Medical Decision Making:   Initial Assessment:   61 yo M transfer for IR intervention for L MCA stroke.   Differential Diagnosis:   Left MCA Stroke  SAH  Viral Meningitis   Bacterial Meningitis   TBI  Alcohol Intoxication     Clinical Tests:   Lab Tests: Ordered  Radiological Study: Ordered  Medical Tests: Ordered  ED Management:  8:33 AM   CT non con and then patient taken to IR for intervention of L MCA occlusion  CBC, CMP, LDL, Troponin, INR, ISTAT    12:10 PM  Admitted to Neuro ICU after IR intervention               Attending Attestation:   Physician Attestation Statement for Resident:  As the supervising MD   Physician Attestation Statement: I have personally seen and examined this patient.   I agree with the above history. -: 60-year-old man presents for further evaluation and management of an acute stroke identified at the previous facility.   As the supervising MD I agree with the above PE.    As the supervising MD I agree with the above treatment, course, plan, and disposition.  I have reviewed and agree with the residents interpretation of the following: lab data.  I have reviewed the following: old records at this facility and records from a referring facility.                                  Clinical Impression:       ICD-10-CM ICD-9-CM   1. Stroke I63.9 434.91   2. Embolic cerebral infarction I63.40 434.11         Disposition:   Disposition: Admitted  Condition: Serious                     Javier Rodriguez MD  Resident  12/06/19 1325       Raphael Will MD  12/06/19 5435

## 2019-12-06 NOTE — SUBJECTIVE & OBJECTIVE
No past medical history on file.  No past surgical history on file.  No family history on file.  Social History     Tobacco Use    Smoking status: Not on file   Substance Use Topics    Alcohol use: Not on file    Drug use: Not on file     Review of patient's allergies indicates:  Not on File    Medications: I have reviewed the current medication administration record.      (Not in a hospital admission)    Review of Systems   Constitutional: Positive for activity change.   Eyes: Negative for visual disturbance.   Respiratory: Negative for shortness of breath and wheezing.    Cardiovascular: Negative for chest pain.   Gastrointestinal: Negative for diarrhea, nausea and vomiting.   Skin: Negative for color change and pallor.   Neurological: Positive for speech difficulty, weakness and numbness. Negative for facial asymmetry.   Psychiatric/Behavioral: Positive for confusion and decreased concentration.     Objective:     Vital Signs (Most Recent):  Temp: 97.8 °F (36.6 °C) (12/06/19 0953)  Pulse: 78 (12/06/19 1045)  Resp: 13 (12/06/19 1045)  BP: 128/60 (12/06/19 1045)  SpO2: 95 % (12/06/19 1045)    Vital Signs Range (Last 24H):  Temp:  [97.8 °F (36.6 °C)]   Pulse:  [65-82]   Resp:  [13-22]   BP: (116-165)/(60-96)   SpO2:  [94 %-100 %]     Physical Exam   Constitutional: He appears well-developed and well-nourished.   HENT:   Head: Normocephalic.   Eyes: Pupils are equal, round, and reactive to light.   Left gaze preference    Neck: Full passive range of motion without pain.   Cardiovascular: Normal rate.   Pulmonary/Chest: Effort normal.   Abdominal: Soft.   Musculoskeletal:   Right sided weakness   Skin: Abrasion (right side of head) noted.   Psychiatric: His speech is slurred.       Neurological Exam:   LOC: alert  Attention Span: Good   Language: Expressive aphasia, Receptive aphasia, not following commands consistently, not able to answer simple questions  Articulation: Dysarthria  Orientation: Untestable due  to severe aphasia   Visual Fields: Full  EOM (CN III, IV, VI): Gaze preference  left  Pupils (CN II, III): PERRL  Facial Sensation (CN V): Normal  Reflexes: 2+ throughout  Motor: Arm left  Normal 5/5  Leg left  Normal 5/5  Arm right  Paresis: 4/5  Leg right Paresis: 4/5  Cebellar: EVAN- not following commands consistently  Sensation: Tactile extinction to bilateral simultaneous stimulation   Tone: Normal tone throughout      Laboratory:  CMP:   Recent Labs   Lab 12/06/19 0817   CALCIUM 9.1   ALBUMIN 3.5   PROT 7.1      K 4.2   CO2 24      BUN 15   CREATININE 0.9   ALKPHOS 62   ALT 12   AST 17   BILITOT 0.6     CBC:   Recent Labs   Lab 12/06/19 0817   WBC 12.41   RBC 4.43*   HGB 13.7*   HCT 40.3      MCV 91   MCH 30.9   MCHC 34.0     Lipid Panel:   Recent Labs   Lab 12/06/19 0817   CHOL 218*   LDLCALC 144.0   HDL 53   TRIG 105     Coagulation:   Recent Labs   Lab 12/06/19 0817   INR 0.9     Hgb A1C: No results for input(s): HGBA1C in the last 168 hours.  TSH:   Recent Labs   Lab 12/06/19 0817   TSH 0.976       Diagnostic Results:      Brain imaging:    CT head 12/06/2019    Ill-defined regions of decreased attenuation left MCA territory specifically left insula, left posterior temporoparietal cortex and lateral aspect of the left lentiform nucleus most compatible with acute areas of infarction.  There is no significant mass effect or midline shift.  No evidence for hemorrhagic conversion.    There is slight hyperdensity associated with the intracranial vasculature likely related to recent contrast administration for outside institution angiography  Vessel Imaging:  pending    Cardiac Evaluation:   pending

## 2019-12-06 NOTE — NURSING TRANSFER
Upon examing patients belongings for identification.  RN found what seemed to be a gun and knife in the patients belongings. RN immediately notified PACU charge nurse / OC of the findings. The charge immediately came to the bedside and notified security officers.  Security personal presented to bedside and confirmed that the gun was fake and was actually a lighter that looked like a gun. However, the knife and fake gun was taken with security officers.

## 2019-12-06 NOTE — ASSESSMENT & PLAN NOTE
60 y.o. yo male with unknown past medical history, who presented to [facility] with AMS s/p fall. LKN ~1130 pm pn 12/5. CTA completed at OSH revealed left MCA proximal occlusion. Patient transferred to [facility] for possible intervention. Patient VAN + on arrival to [facility]. CTH with ASPECTS ~7. Patient taken to IR and is now s/p successful thrombectomy with TICI 2b flow. Admitted to [facility] for close monitoring/higher level of care      Antithrombotics for secondary stroke prevention: Antiplatelets: None: hold     Statins for secondary stroke prevention and hyperlipidemia, if present:   Statins: Atorvastatin- 40 mg daily    Aggressive risk factor modification: HLD,       Rehab efforts: The patient has been evaluated by a stroke team provider and the therapy needs have been fully considered based off the presenting complaints and exam findings. The following therapy evaluations are needed: PT evaluate and treat, OT evaluate and treat, SLP evaluate and treat, PM&R evaluate for appropriate placement    Diagnostics ordered/pending: MRI head without contrast to assess brain parenchyma, TTE to assess cardiac function/status , TSH to assess thyroid function    VTE prophylaxis: Heparin 5000 units SQ every 8 hours    BP parameters: Infarct: Post sucessful thrombectomy, SBP <140

## 2019-12-07 PROBLEM — R53.1 DECREASED STRENGTH, ENDURANCE, AND MOBILITY: Status: ACTIVE | Noted: 2019-12-07

## 2019-12-07 PROBLEM — I63.9 APHASIA DUE TO ACUTE CEREBROVASCULAR ACCIDENT (CVA): Status: ACTIVE | Noted: 2019-12-06

## 2019-12-07 PROBLEM — Z74.09 DECREASED STRENGTH, ENDURANCE, AND MOBILITY: Status: ACTIVE | Noted: 2019-12-07

## 2019-12-07 PROBLEM — R68.89 DECREASED STRENGTH, ENDURANCE, AND MOBILITY: Status: ACTIVE | Noted: 2019-12-07

## 2019-12-07 LAB
ALBUMIN SERPL BCP-MCNC: 3.4 G/DL (ref 3.5–5.2)
ALP SERPL-CCNC: 65 U/L (ref 55–135)
ALT SERPL W/O P-5'-P-CCNC: 18 U/L (ref 10–44)
ANION GAP SERPL CALC-SCNC: 11 MMOL/L (ref 8–16)
APTT BLDCRRT: 25.6 SEC (ref 21–32)
AST SERPL-CCNC: 23 U/L (ref 10–40)
BASOPHILS # BLD AUTO: 0.07 K/UL (ref 0–0.2)
BASOPHILS NFR BLD: 0.7 % (ref 0–1.9)
BILIRUB SERPL-MCNC: 0.6 MG/DL (ref 0.1–1)
BUN SERPL-MCNC: 14 MG/DL (ref 6–20)
CALCIUM SERPL-MCNC: 9.2 MG/DL (ref 8.7–10.5)
CHLORIDE SERPL-SCNC: 106 MMOL/L (ref 95–110)
CO2 SERPL-SCNC: 24 MMOL/L (ref 23–29)
CREAT SERPL-MCNC: 0.8 MG/DL (ref 0.5–1.4)
DIFFERENTIAL METHOD: ABNORMAL
EOSINOPHIL # BLD AUTO: 0.1 K/UL (ref 0–0.5)
EOSINOPHIL NFR BLD: 0.7 % (ref 0–8)
ERYTHROCYTE [DISTWIDTH] IN BLOOD BY AUTOMATED COUNT: 14 % (ref 11.5–14.5)
EST. GFR  (AFRICAN AMERICAN): >60 ML/MIN/1.73 M^2
EST. GFR  (NON AFRICAN AMERICAN): >60 ML/MIN/1.73 M^2
GLUCOSE SERPL-MCNC: 107 MG/DL (ref 70–110)
HCT VFR BLD AUTO: 41.4 % (ref 40–54)
HGB BLD-MCNC: 13.6 G/DL (ref 14–18)
IMM GRANULOCYTES # BLD AUTO: 0.03 K/UL (ref 0–0.04)
IMM GRANULOCYTES NFR BLD AUTO: 0.3 % (ref 0–0.5)
INR PPP: 1 (ref 0.8–1.2)
LYMPHOCYTES # BLD AUTO: 2 K/UL (ref 1–4.8)
LYMPHOCYTES NFR BLD: 20 % (ref 18–48)
MAGNESIUM SERPL-MCNC: 1.8 MG/DL (ref 1.6–2.6)
MCH RBC QN AUTO: 29.9 PG (ref 27–31)
MCHC RBC AUTO-ENTMCNC: 32.9 G/DL (ref 32–36)
MCV RBC AUTO: 91 FL (ref 82–98)
MONOCYTES # BLD AUTO: 0.7 K/UL (ref 0.3–1)
MONOCYTES NFR BLD: 6.9 % (ref 4–15)
NEUTROPHILS # BLD AUTO: 7.1 K/UL (ref 1.8–7.7)
NEUTROPHILS NFR BLD: 71.4 % (ref 38–73)
NRBC BLD-RTO: 0 /100 WBC
PHOSPHATE SERPL-MCNC: 2.5 MG/DL (ref 2.7–4.5)
PLATELET # BLD AUTO: 235 K/UL (ref 150–350)
PMV BLD AUTO: 9.8 FL (ref 9.2–12.9)
POCT GLUCOSE: 106 MG/DL (ref 70–110)
POTASSIUM SERPL-SCNC: 3.8 MMOL/L (ref 3.5–5.1)
PROT SERPL-MCNC: 6.9 G/DL (ref 6–8.4)
PROTHROMBIN TIME: 10.1 SEC (ref 9–12.5)
RBC # BLD AUTO: 4.55 M/UL (ref 4.6–6.2)
SODIUM SERPL-SCNC: 141 MMOL/L (ref 136–145)
WBC # BLD AUTO: 9.98 K/UL (ref 3.9–12.7)

## 2019-12-07 PROCEDURE — 97165 OT EVAL LOW COMPLEX 30 MIN: CPT

## 2019-12-07 PROCEDURE — 85025 COMPLETE CBC W/AUTO DIFF WBC: CPT

## 2019-12-07 PROCEDURE — 85730 THROMBOPLASTIN TIME PARTIAL: CPT

## 2019-12-07 PROCEDURE — 25000003 PHARM REV CODE 250: Performed by: RADIOLOGY

## 2019-12-07 PROCEDURE — 99233 PR SUBSEQUENT HOSPITAL CARE,LEVL III: ICD-10-PCS | Mod: ,,, | Performed by: NURSE PRACTITIONER

## 2019-12-07 PROCEDURE — 20000000 HC ICU ROOM

## 2019-12-07 PROCEDURE — 99233 SBSQ HOSP IP/OBS HIGH 50: CPT | Mod: ,,, | Performed by: NURSE PRACTITIONER

## 2019-12-07 PROCEDURE — 85610 PROTHROMBIN TIME: CPT

## 2019-12-07 PROCEDURE — 97162 PT EVAL MOD COMPLEX 30 MIN: CPT

## 2019-12-07 PROCEDURE — 63600175 PHARM REV CODE 636 W HCPCS: Performed by: STUDENT IN AN ORGANIZED HEALTH CARE EDUCATION/TRAINING PROGRAM

## 2019-12-07 PROCEDURE — 25000003 PHARM REV CODE 250: Performed by: PSYCHIATRY & NEUROLOGY

## 2019-12-07 PROCEDURE — 80053 COMPREHEN METABOLIC PANEL: CPT

## 2019-12-07 PROCEDURE — 97802 MEDICAL NUTRITION INDIV IN: CPT

## 2019-12-07 PROCEDURE — 25000003 PHARM REV CODE 250: Performed by: STUDENT IN AN ORGANIZED HEALTH CARE EDUCATION/TRAINING PROGRAM

## 2019-12-07 PROCEDURE — 99233 SBSQ HOSP IP/OBS HIGH 50: CPT | Mod: ,,, | Performed by: PSYCHIATRY & NEUROLOGY

## 2019-12-07 PROCEDURE — 25000003 PHARM REV CODE 250: Performed by: NURSE PRACTITIONER

## 2019-12-07 PROCEDURE — 92523 SPEECH SOUND LANG COMPREHEN: CPT

## 2019-12-07 PROCEDURE — 84100 ASSAY OF PHOSPHORUS: CPT

## 2019-12-07 PROCEDURE — 92610 EVALUATE SWALLOWING FUNCTION: CPT

## 2019-12-07 PROCEDURE — 83735 ASSAY OF MAGNESIUM: CPT

## 2019-12-07 PROCEDURE — 99233 PR SUBSEQUENT HOSPITAL CARE,LEVL III: ICD-10-PCS | Mod: ,,, | Performed by: PSYCHIATRY & NEUROLOGY

## 2019-12-07 RX ORDER — ATORVASTATIN CALCIUM 20 MG/1
40 TABLET, FILM COATED ORAL NIGHTLY
Status: DISCONTINUED | OUTPATIENT
Start: 2019-12-07 | End: 2019-12-17 | Stop reason: HOSPADM

## 2019-12-07 RX ORDER — CARVEDILOL 6.25 MG/1
6.25 TABLET ORAL 2 TIMES DAILY
Status: DISCONTINUED | OUTPATIENT
Start: 2019-12-07 | End: 2019-12-09

## 2019-12-07 RX ORDER — ACETAMINOPHEN 325 MG/1
650 TABLET ORAL EVERY 6 HOURS PRN
Status: DISCONTINUED | OUTPATIENT
Start: 2019-12-08 | End: 2019-12-17 | Stop reason: HOSPADM

## 2019-12-07 RX ORDER — BUTALBITAL, ACETAMINOPHEN AND CAFFEINE 50; 325; 40 MG/1; MG/1; MG/1
1 TABLET ORAL ONCE
Status: COMPLETED | OUTPATIENT
Start: 2019-12-07 | End: 2019-12-08

## 2019-12-07 RX ORDER — ASPIRIN 81 MG/1
81 TABLET ORAL DAILY
Status: DISCONTINUED | OUTPATIENT
Start: 2019-12-07 | End: 2019-12-17 | Stop reason: HOSPADM

## 2019-12-07 RX ADMIN — CARVEDILOL 6.25 MG: 6.25 TABLET, FILM COATED ORAL at 09:12

## 2019-12-07 RX ADMIN — CARVEDILOL 6.25 MG: 6.25 TABLET, FILM COATED ORAL at 12:12

## 2019-12-07 RX ADMIN — SODIUM PHOSPHATE, MONOBASIC, MONOHYDRATE AND SODIUM PHOSPHATE, DIBASIC, ANHYDROUS 15 MMOL: 276; 142 INJECTION, SOLUTION INTRAVENOUS at 04:12

## 2019-12-07 RX ADMIN — SENNOSIDES AND DOCUSATE SODIUM 1 TABLET: 8.6; 5 TABLET ORAL at 09:12

## 2019-12-07 RX ADMIN — ATORVASTATIN CALCIUM 40 MG: 20 TABLET, FILM COATED ORAL at 12:12

## 2019-12-07 RX ADMIN — NICARDIPINE HYDROCHLORIDE 2.5 MG/HR: 0.2 INJECTION, SOLUTION INTRAVENOUS at 10:12

## 2019-12-07 RX ADMIN — MAGNESIUM SULFATE IN WATER 2 G: 40 INJECTION, SOLUTION INTRAVENOUS at 04:12

## 2019-12-07 RX ADMIN — ASPIRIN 81 MG: 81 TABLET, COATED ORAL at 03:12

## 2019-12-07 RX ADMIN — NICARDIPINE HYDROCHLORIDE 5 MG/HR: 0.2 INJECTION, SOLUTION INTRAVENOUS at 07:12

## 2019-12-07 RX ADMIN — POTASSIUM CHLORIDE 40 MEQ: 7.46 INJECTION, SOLUTION INTRAVENOUS at 06:12

## 2019-12-07 NOTE — PT/OT/SLP EVAL
Physical Therapy Evaluation    Patient Name:  Riaz Lane   MRN:  [id number]    Recommendations:     Discharge Recommendations:  rehabilitation facility   Discharge Equipment Recommendations: (TBD pending progress at next level of care)   Barriers to discharge: patient below functional baseline, significant receptive aphasia    Assessment:     Riaz Lane is a 59 y.o. male admitted with a medical diagnosis of Embolic stroke involving left middle cerebral artery.  He presents with the following impairments/functional limitations:  impaired endurance, impaired self care skills, impaired functional mobilty, impaired cognition, impaired balance, decreased upper extremity function, impaired cardiopulmonary response to activity, gait instability, decreased lower extremity functio. The patient demonstrates receptive aphasia, apraxia, impairment in dynamic standing balance. Unable to determine patient's prior level of function or living situation due to aphasia, attempted to call patient's friend but he did not answer. The patient is not safe to return home due to severity of his receptive aphasia.  Based on the patient's progress with therapy, motivation to participate in treatment, and prior level of independence, they are an excellent candidate for inpatient rehabilitation and they would benefit from rehab to maximize their functional potential.      Rehab Prognosis: Good; patient would benefit from acute skilled PT services to address these deficits and reach maximum level of function.    Recent Surgery: Procedure(s) (LRB):  ANGIOGRAM-CEREBRAL (N/A) 1 Day Post-Op    Plan:     During this hospitalization, patient to be seen 3 x/week to address the identified rehab impairments via gait training, therapeutic exercises, therapeutic activities, neuromuscular re-education and progress toward the following goals:    · Plan of Care Expires:  01/06/20    Subjective     Chief Complaint: denies pain  Patient/Family Comments/goals:  mobilize with therapy  Pain/Comfort:  · Pain Rating 1: 0/10    Patients cultural, spiritual, Christianity conflicts given the current situation: no    Living Environment:  Unknown, attempted to call patient's friend, Tae Worley- did not answer. Patient unable to report due to receptive aphasia.  Prior to admission, patients level of function was unknown.  Equipment used at home: (unknown).  DME owned (not currently used): unknown.  Upon discharge, patient will have assistance from unknown.    Objective:     Communicated with RN prior to session.  Patient found HOB elevated with blood pressure cuff, telemetry, pulse ox (continuous), peripheral IV  upon PT entry to room.    General Precautions: Standard, aphasia, fall   Orthopedic Precautions:N/A   Braces: N/A     Exams:    Cognitive Exam  Patient is A&O xself, stated his  when asked the year and follows 50% of one -step commands with verbal/manual/visual cues; receptive aphasia, apraxia   Fine Motor Coordination   -       Intact heel to shin bilateral lower extremities     Postural Exam Patient presented with the following abnormalities:    -       Rounded shoulders  -       Forward head  -       Kyphosis   Sensation    -       Light touch intact bilateral lower extremities, possible decreased R UE sensation- difficult to assess due to aphasia   Skin Integrity/Edema     -       Skin integrity: abrasion R forehead  -       Edema: NA   R LE ROM WFL   R LE Strength 4/5 hip flexion, knee ext/flex, and ankle DF/PF   L LE ROM WFL   L LE Strength  4+/5 hip flexion, knee ext/flex, and ankle DF/PF     Balance          Static Sitting stand by assistance    Dynamic Sitting stand by assistance    Static Standing stand by assistance    Dynamic Standing contact guard assist  Wide SUNNI eyes open: WFL  Wide SUNNI eyes closed: WFL  Narrow SUNNI eyes open: WFL  Narrow SUNNI eyes closed: WFL  Single limb stance R: Unable, loss of balance  Single limb stance L: 3 sec  BOLD indicates  loss of balance with activity                 Functional Mobility:    Bed Mobility  Rolling to R: contact guard assist   Supine <> Sit:  contact guard assist, manual and verbal cues for sequencing   Transfers Sit to Stand:  contact guard assist , manual and verbal cues for sequencing   Gait  Gait Distance: 14 ft with no AD, 1 turn to R  Assistance Level: contact guard assist  Description: decreased gait speed, wide SUNNI, short strides, decreased foot clearance, assist for line management.         Therapeutic Activities and Exercises:   Patient educated on role of therapy, goals of session, benefits of out of bed mobility. Patient agreeable to mobilize with therapy.  Discussed PT plan of care during hospitalization. Patient educated that they need to call for assistance to mobilize out of bed. Whiteboard updated as appropriate. Patient educated on how their diagnosis impacts their mobility within PT scope of practice.     AM-PAC 6 CLICK MOBILITY  Total Score:21     Patient left HOB elevated with all lines intact, call button in reach, bed alarm on and RN notified.    GOALS:   Multidisciplinary Problems     Physical Therapy Goals        Problem: Physical Therapy Goal    Goal Priority Disciplines Outcome Goal Variances Interventions   Physical Therapy Goal     PT, PT/OT Ongoing, Progressing     Description:  Goals to be met by:      Patient will increase functional independence with mobility by performin. Supine to sit with Modified [place]  2. Sit to supine with Modified [place]  3. Sit to stand transfer with Modified [place]  4. Ambulate 3 minutes with supervision assistance while performing dynamic head turns, sudden change in speed and direction, withstand dynamic perturbations with no loss of balance.     5. Lower extremity exercise program x20 reps per handout, with independence to improve strength and activity tolerance.                       History:     History reviewed. No pertinent  past medical history.    History reviewed. No pertinent surgical history.    Time Tracking:     PT Received On: 12/07/19  PT Start Time: 1020     PT Stop Time: 1050  PT Total Time (min): 30 min     Billable Minutes: Evaluation 20 (co-eval with OT)      Tiffanie Wise, PT  12/07/2019

## 2019-12-07 NOTE — PROGRESS NOTES
Patient assisted to the MRI bed, tele, O2 sensor, BP cuff,, placed in MRI, tolerating well , WCTM   Breathing spontaneous and unlabored. Breath sounds clear and equal bilaterally with regular rhythm.

## 2019-12-07 NOTE — PLAN OF CARE
Problem: SLP Goal  Goal: SLP Goal  Description  Speech Language Pathology Goals  Goals expected to be met by 12/14/19  1. Pt will tolerate dental soft diet with thin liquids with adequate oral clearance and no overt S/S aspiration, MOD I  2. Pt will complete automatic speech tasks with 90% accuracy, MIN A  3. Pt will name common objects with 75% accuracy or higher, MIN A  4. Pt will answer YNQ with 90% accuracy, MIN A  5. Pt will follow simple commands with 90% accuracy, MIN A  6. Pt will participate in further assessment of cognition, reading and writing   7. Educate Pt and family on safety awareness and compensatory strategies for fx communication      Outcome: Ongoing, Progressing      SLP Evaluation initiated. Pt presents with Aphasia and Mild Oral Dysphagia. REC: Level VI dental soft diet with thin liquids, medications one at a time, provided strict aspiration precautions,good oral care and ST to continue to follow.  Findings/recs reviewed with RN and MD team.     GHADA Neal., [facility]-SLP  Speech-Language Pathology  Pager: [phone]  12/7/2019

## 2019-12-07 NOTE — PLAN OF CARE
Problem: Occupational Therapy Goal  Goal: Occupational Therapy Goal  Description  Goals set on 12/7, with expiration date 12/21:  Patient will increase functional independence with ADLs by performing:    Feeding with Stand-by Assistance.  Grooming while standing at sink with Contact Guard Assistance.  UB Dressing with Contact Guard Assistance.  LB Dressing with Contact Guard Assistance.  Toileting from toilet with Minimal Assistance for hygiene and clothing management.   Rolling to Bilateral with Stand-by Assistance.   Supine <> Sit with Stand-by Assistance.  Step transfer with Contact Guard Assistance  Toilet transfer to toilet with Minimal Assistance.     Outcome: Ongoing, Progressing     Eval complete. Pt tolerated session well. Initiate OT POC.  LASHAY Mendse  12/07/2019

## 2019-12-07 NOTE — PLAN OF CARE
Problem: Physical Therapy Goal  Goal: Physical Therapy Goal  Description  Goals to be met by:      Patient will increase functional independence with mobility by performin. Supine to sit with Modified [place]  2. Sit to supine with Modified [place]  3. Sit to stand transfer with Modified [place]  4. Ambulate 3 minutes with supervision assistance while performing dynamic head turns, sudden change in speed and direction, withstand dynamic perturbations with no loss of balance.     5. Lower extremity exercise program x20 reps per handout, with independence to improve strength and activity tolerance.      Outcome: Ongoing, Progressing   Evaluation completed, initiated plan of care.   Tiffanie Wise, PT  2019

## 2019-12-07 NOTE — CONSULTS
[facility]  Adult Nutrition  Consult Note    SUMMARY     Recommendations    Recommendation/Intervention:   1.) ADAT to regular; texture per SLP.   2.) If PO intakes <50% of meals, suggest Boost Plus.   3.) Suggest MVI.   4.) Daily weights    Goals: 1.) Pt to return to nutritionally adequate diet by follow up.   Nutrition Goal Status: new  Communication of RD Recs: (POC)    Reason for Assessment    Reason For Assessment: consult  Diagnosis: stroke/CVA  Relevant Medical History: None  Interdisciplinary Rounds: did not attend  General Information Comments: Pt continues to remain in PACU and have yet to receive room assignment. Reviewed chart with pt now s/p thrombectomy, passed TAMARA swallow while in PACU, awaiting SLP evaluation. No wt or ht hx per chart. Will attempt to clarify PO intake PTA as well as wt hx, food allergies, and perform NFPE if indicated.   Nutrition Discharge Planning: Unable to assess at this time.     Nutrition/Diet History    Spiritual, Cultural Beliefs, Muslim Practices, Values that Affect Care: no    Anthropometrics    Temp: 98 °F (36.7 °C)       Lab/Procedures/Meds    Pertinent Labs Reviewed: reviewed  Pertinent Labs Comments: HbA1C 5.7, Phos 2.5  Pertinent Medications Reviewed: reviewed  Pertinent Medications Comments: senna-docusate    Estimated/Assessed Needs    Unable to assess as no ht or wt documented. Pt currently in PACU and unable to assess pt.     Nutrition Prescription Ordered    Current Diet Order: NPO    Evaluation of Received Nutrient/Fluid Intake    I/O: +0.05L since admit  Comments: LBM PTA  % Intake of Estimated Energy Needs: 0 - 25 %  % Meal Intake: NPO    Nutrition Risk    Level of Risk/Frequency of Follow-up: high     Assessment and Plan    Nutrition Problem  Inadequate energy intake    Related to (etiology):   Current diet (NPO)    Signs and Symptoms (as evidenced by):   <75% of nutritional needs being met    Interventions(treatment  strategy):  Collaboration of nutrition care with other providers  Referral of care  General healthful diet  Supplemental beverage-Boost Plus  Vitamins-MVI    Nutrition Diagnosis Status:   New       Monitor and Evaluation    Food and Nutrient Intake: energy intake  Food and Nutrient Adminstration: diet order  Anthropometric Measurements: height/length, weight, weight change, body mass index  Biochemical Data, Medical Tests and Procedures: electrolyte and renal panel, gastrointestinal profile, glucose/endocrine profile, inflammatory profile  Nutrition-Focused Physical Findings: overall appearance     Malnutrition Assessment   Unable to assess at this time.     Nutrition Follow-Up    RD Follow-up?: Yes

## 2019-12-07 NOTE — PT/OT/SLP EVAL
"Occupational Therapy   Evaluation    Name: Riaz Lane  MRN: [id number]  Admitting Diagnosis:  Embolic stroke involving left middle cerebral artery 1 Day Post-Op    Recommendations:     Discharge Recommendations: rehabilitation facility  Discharge Equipment Recommendations:  other (see comments)(TBD)  Barriers to discharge:  Inaccessible home environment, Decreased caregiver support    Assessment:     Riaz Lane is a 59 y.o. male with a medical diagnosis of Embolic stroke involving left middle cerebral artery.  He presents with performance deficits affecting function: weakness, impaired endurance, impaired sensation, impaired self care skills, impaired functional mobilty, gait instability, impaired balance, impaired cognition, decreased coordination, decreased upper extremity function, decreased lower extremity function, decreased safety awareness, impaired coordination, impaired fine motor, impaired cardiopulmonary response to activity. Pt presented with aphasia, both expressive and receptive though was able to follow commands with improved performance with visual and tactile cues. Apraxia and decreased sensation of RUE noted. Pt oriented to self.  Once medically stable, Pt is an excellent candidate for inpatient rehabilitation, as he has a qualifying diagnosis, displays good activity tolerance, has experienced decline from PLOF,  and is motivated to participate in therapy.         Rehab Prognosis: Good; patient would benefit from acute skilled OT services to address these deficits and reach maximum level of function.       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 Patient unable to report 2* aphasia  Previous level of function: unknown  Roles and Routines: unknown  Equipment Used at Home:  other (see comments)(unknown)  Assistance upon Discharge: unknown     Pain/Comfort:  · Pain Rating 1: 0/10(unable to fully assess 2* aphasia)  · Pain Rating Post-Intervention 1: 0/10    Patients cultural, spiritual, Sabianist conflicts given the current situation: no    Objective:     Communicated with: RN prior to session.  Patient found HOB elevated with bed alarm, blood pressure cuff, peripheral IV, pulse ox (continuous), telemetry upon OT entry to room.    General Precautions: Standard, aphasia, fall, NPO   Orthopedic Precautions:N/A   Braces: N/A     Occupational Performance:    Bed Mobility:    · Patient completed Rolling/Turning to Left with  contact guard assistance  · Patient completed Rolling/Turning to Right with contact guard assistance  · Patient completed Scooting/Bridging with contact guard assistance  · Patient completed Supine to Sit with contact guard assistance  · Patient completed Sit to Supine with contact guard assistance    Functional Mobility/Transfers:  · Patient completed Sit <> Stand Transfer with contact guard assistance  with  no assistive device with VC for safety, pt with impaired insight into deficits.   · Functional Mobility: CGA for steps at bedside. Decreased speed of gait and wide SUNNI.     Activities of Daily Living:  · Feeding:  NPO     · Lower Body Dressing: contact guard assistance seated EOB, patient with increased difficulty donning L sock 2* "doesn't work" patient nodded yes when asked if previous L knee injury    Cognitive/Visual Perceptual:  Cognitive/Psychosocial Skills:     -       Oriented to: Person   -       Follows " Commands/attention:Easily distracted and aprx 50% of one-step commands  -       Communication: expressive aphasia and receptive aphasia  -       Memory: unable to assess 2* aphasia  -       Safety awareness/insight to disability: impaired   -       Mood/Affect/Coping skills/emotional control: Cooperative and Pleasant  Visual/Perceptual:      -Intact      Physical Exam:  Postural examination/scapula alignment:    -       Rounded shoulders  -       Forward head  Skin integrity: Visible skin intact  Edema:  None noted  Sensation:    -       Impaired  light/touch RUE  and sharp/dull RUE  Dominant hand:    -       L handed  Upper Extremity Range of Motion:     -       Right Upper Extremity: WFL  -       Left Upper Extremity: WFL  Upper Extremity Strength:    -       Right Upper Extremity: 4-/5  -       Left Upper Extremity: WFL   Strength:    -       Right Upper Extremity: Deficits: fair grasp  -       Left Upper Extremity: Deficits: fair grasp  Fine Motor Coordination:    -       Impaired  Right hand thumb/finger opposition skills impaired and Right hand, manipulation of objects impaired  Gross motor coordination:   ataxia    AMPAC 6 Click ADL:  AMPAC Total Score: 16    Treatment & Education:  -OT evaluation complete,  unable to fully assess 2* aphasia  -Orientation assessed, patient oriented to self  -Pt education on OT role and POC   -Importance of OOB activity with staff assistance  -Safety during functional transfer and mobility  -White board updated  -Multiple self-care tasks and functional mobility completed -- assistance level noted above  -All questions and concerns answered within OT scope of practice.     Education:    Patient left HOB elevated with all lines intact, call button in reach, bed alarm on, RN notified and RN present    GOALS:   Multidisciplinary Problems     Occupational Therapy Goals        Problem: Occupational Therapy Goal    Goal Priority Disciplines Outcome Interventions   Occupational  Therapy Goal     OT, PT/OT Ongoing, Progressing    Description:  Goals set on 12/7, with expiration date 12/21:  Patient will increase functional independence with ADLs by performing:    Feeding with Stand-by Assistance.  Grooming while standing at sink with Contact Guard Assistance.  UB Dressing with Contact Guard Assistance.  LB Dressing with Contact Guard Assistance.  Toileting from toilet with Minimal Assistance for hygiene and clothing management.   Rolling to Bilateral with Stand-by Assistance.   Supine <> Sit with Stand-by Assistance.  Step transfer with Contact Guard Assistance  Toilet transfer to toilet with Minimal Assistance.                      History:     History reviewed. No pertinent past medical history.    History reviewed. No pertinent surgical history.    Time Tracking:     OT Date of Treatment: 12/07/19  OT Start Time: 1020  OT Stop Time: 1050  OT Total Time (min): 30 min    Billable Minutes:Evaluation 30 CoEval with PT    Kimberly Bermudez, OT  12/7/2019

## 2019-12-07 NOTE — PROGRESS NOTES
Awake - HARSHIL - oriented to self and birth date. Requests water, food and urinal - sometimes has trouble finding the right words. Follows commands. K+, Mg+ and Phosp replaced per PRN orders.

## 2019-12-07 NOTE — SUBJECTIVE & OBJECTIVE
Neurologic Chief Complaint: Embolic stroke involving left middle cerebral artery    Subjective:     Interval History: Patient is seen for follow-up neurological assessment and treatment recommendations: NAEO. S/p successful LM1 thrombectomy. Able to follow commands. Still with expressive aphasia.     HPI, Past Medical, Family, and Social History remains the same as documented in the initial encounter.     Review of Systems  Scheduled Meds:   aspirin  81 mg Oral Daily    atorvastatin  40 mg Oral QHS    carvedilol  6.25 mg Oral BID    senna-docusate 8.6-50 mg  1 tablet Oral BID     Continuous Infusions:   niCARdipine Stopped (12/07/19 1245)     PRN Meds:calcium gluconate IVPB, calcium gluconate IVPB, calcium gluconate IVPB, magnesium sulfate IVPB, magnesium sulfate IVPB, potassium chloride in water **AND** potassium chloride in water **AND** potassium chloride in water, sodium chloride 0.9%, sodium chloride 0.9%, sodium phosphate IVPB, sodium phosphate IVPB, sodium phosphate IVPB     Review of Systems   Constitutional: Positive for activity change.   Eyes: Negative for visual disturbance.   Respiratory: Negative for shortness of breath and wheezing.    Cardiovascular: Negative for chest pain.   Gastrointestinal: Negative for diarrhea, nausea and vomiting.   Skin: Negative for color change and pallor.   Neurological: Positive for speech difficulty, weakness and numbness. Negative for facial asymmetry.   Psychiatric/Behavioral: Positive for confusion and decreased concentration.     Objective:     Vital Signs (Most Recent):  Temp: 97.8 °F (36.6 °C) (12/07/19 1400)  Pulse: 80 (12/07/19 1500)  Resp: 20 (12/07/19 1500)  BP: 133/72 (12/07/19 1500)  SpO2: 95 % (12/07/19 1500)  BP Location: Right arm    Vital Signs Range (Last 24H):  Temp:  [97.7 °F (36.5 °C)-98.2 °F (36.8 °C)]   Pulse:  [70-96]   Resp:  [11-20]   BP: (121-157)/(58-91)   SpO2:  [90 %-100 %]   BP Location: Right arm    Physical Exam   Constitutional: He  appears well-developed and well-nourished.   HENT:   Head: Normocephalic.   Eyes: Pupils are equal, round, and reactive to light.   Left gaze preference    Neck: Full passive range of motion without pain.   Cardiovascular: Normal rate.   Pulmonary/Chest: Effort normal.   Abdominal: Soft.   Musculoskeletal:   Right sided weakness   Skin: Abrasion (right side of head) noted.   Psychiatric: His speech is slurred.         Neurological Exam:   LOC: alert  Attention Span: Good   Language: Expressive aphasia, Receptive aphasia, not following commands consistently, not able to answer simple questions  Articulation: Dysarthria  Orientation: moderate expressive aphasia  Visual Fields: Full  EOM (CN III, IV, VI): Gaze preference  left  Pupils (CN II, III): PERRL  Facial Sensation (CN V): Normal  Reflexes: 2+ throughout  Motor: Arm left  Normal 5/5  Leg left  Normal 5/5  Arm right  Paresis: 4/5  Leg right Paresis: 4/5  Cebellar: EVAN- not following commands consistently  Sensation: Tactile extinction to bilateral simultaneous stimulation   Tone: Normal tone throughout    Laboratory:  CMP:   Recent Labs   Lab 12/07/19  0307   CALCIUM 9.2   ALBUMIN 3.4*   PROT 6.9      K 3.8   CO2 24      BUN 14   CREATININE 0.8   ALKPHOS 65   ALT 18   AST 23   BILITOT 0.6     CBC:   Recent Labs   Lab 12/07/19  0307   WBC 9.98   RBC 4.55*   HGB 13.6*   HCT 41.4      MCV 91   MCH 29.9   MCHC 32.9     Lipid Panel:   Recent Labs   Lab 12/06/19  0817   CHOL 218*   LDLCALC 144.0   HDL 53   TRIG 105     Coagulation:   Recent Labs   Lab 12/07/19  0307   INR 1.0   APTT 25.6     Hgb A1C:   Recent Labs   Lab 12/06/19  1635   HGBA1C 5.7*     TSH:   Recent Labs   Lab 12/06/19  0817   TSH 0.976       Diagnostic Results     Brain imaging:     CT head 12/06/2019    Ill-defined regions of decreased attenuation left MCA territory specifically left insula, left posterior temporoparietal cortex and lateral aspect of the left lentiform nucleus most  compatible with acute areas of infarction.  There is no significant mass effect or midline shift.  No evidence for hemorrhagic conversion.    There is slight hyperdensity associated with the intracranial vasculature likely related to recent contrast administration for outside institution angiography      Cardiac Evaluation:   · Normal left ventricular systolic function. The estimated ejection fraction is 65%  · Eccentric left ventricular hypertrophy.  · Indeterminate left ventricular diastolic function.  · Normal right ventricular systolic function.  · Mild aortic valve stenosis. peak velocity is 2.94 m/s; mean gradient is 20 mmHg. ABBIE cannot be measured accurately.  · Normal central venous pressure (3 mm Hg).  · Mild mitral regurgitation.  · No wall motion abnormalities.    MRI Brain  1. Left MCA distribution infarct with hemorrhagic conversion in the insula and temporal lobe.  Associated localized mass effect without midline shift.  2. Chronic microvascular ischemic change.

## 2019-12-07 NOTE — ASSESSMENT & PLAN NOTE
60 y.o. yo male with unknown past medical history, who presented to [place] with AMS s/p fall. LKN ~1130 pm pn 12/5. CTA completed at OSH revealed left MCA proximal occlusion. Patient transferred to [facility] for possible intervention. Patient VAN + on arrival to [facility]. CTH with ASPECTS ~7. Patient taken to IR and is now s/p successful thrombectomy with TICI 2b flow. Admitted to [facility] for close monitoring/higher level of care.     Noted Left MCA distribution infarct with hemorrhagic conversion in the insula and temporal lobe on MRI.       Antithrombotics for secondary stroke prevention: Start ASA 81 now    Statins for secondary stroke prevention and hyperlipidemia, if present:   Statins: Atorvastatin- 40 mg daily    Aggressive risk factor modification: HLD,       Rehab efforts: The patient has been evaluated by a stroke team provider and the therapy needs have been fully considered based off the presenting complaints and exam findings. The following therapy evaluations are needed: PT evaluate and treat, OT evaluate and treat, SLP evaluate and treat, PM&R evaluate for appropriate placement    Diagnostics ordered/pending: none    VTE prophylaxis: Heparin 5000 units SQ every 8 hours    BP parameters: Infarct: Post sucessful thrombectomy, SBP <140

## 2019-12-07 NOTE — PROGRESS NOTES
Passed TAMARA test no problem, commenced on regular diet,   PT and OT at bedside at 1030 am, walk short distance no problem,   Speech therapist paged several times, still awaits to see patient, awaits [facility] team to review patient in Pacu

## 2019-12-07 NOTE — PROGRESS NOTES
Seen and examined by [facility] team, systolic BP target to maintain below 160, May wean off Cardene , Commence  PO BP meds, For stat brain MRI , will continue to monitor

## 2019-12-07 NOTE — PROGRESS NOTES
[facility]  Vascular Neurology  Comprehensive Stroke Center  Progress Note    Assessment/Plan:     * Embolic stroke involving left middle cerebral artery  60 y.o. yo male with unknown past medical history, who presented to [place] with AMS s/p fall. LKN ~1130 pm pn 12/5. CTA completed at OSH revealed left MCA proximal occlusion. Patient transferred to [facility] for possible intervention. Patient VAN + on arrival to [facility]. CTH with ASPECTS ~7. Patient taken to IR and is now s/p successful thrombectomy with TICI 2b flow. Admitted to NCC for close monitoring/higher level of care.     Noted Left MCA distribution infarct with hemorrhagic conversion in the insula and temporal lobe on MRI.       Antithrombotics for secondary stroke prevention: Start ASA 81 now    Statins for secondary stroke prevention and hyperlipidemia, if present:   Statins: Atorvastatin- 40 mg daily    Aggressive risk factor modification: HLD,       Rehab efforts: The patient has been evaluated by a stroke team provider and the therapy needs have been fully considered based off the presenting complaints and exam findings. The following therapy evaluations are needed: PT evaluate and treat, OT evaluate and treat, SLP evaluate and treat, PM&R evaluate for appropriate placement    Diagnostics ordered/pending: none    VTE prophylaxis: Heparin 5000 units SQ every 8 hours    BP parameters: Infarct: Post sucessful thrombectomy, SBP <140        Mixed hyperlipidemia  -stroke risk factor         Recommend Atorvastatin 40 mg          No notes on file    STROKE DOCUMENTATION   Acute Stroke Times   Last Known Normal Date: 12/05/19  Last Known Normal Time: 2330  Stroke Team Called Date: 12/06/19  Stroke Team Called Time: 0800  Stroke Team Arrival Date: 12/06/19  Stroke Team Arrival Time: 0804  CT Interpretation Time: 0810  Decision to Treat Time for Alteplase: (N/A)    NIH Scale:      Current NIH Stroke Score Values      Most Recent  Value   Interval  1hour post tPA or Endovascular procedure completion   1a. Level of Consciousness  1-->Not alert, but arousable by minor stimulation to obey, answer, or respond   1b. LOC Questions  2-->Answers neither question correctly   1c. LOC Commands  0-->Performs both tasks correctly   2. Best Gaze  0-->Normal   3. Visual  0-->No visual loss   4. Facial Palsy  0-->Normal symmetrical movements   5a. Motor Arm, Left  0-->No drift, limb holds 90 (or 45) degrees for full 10 secs   5b. Motor Arm, Right  0-->No drift, limb holds 90 (or 45) degrees for full 10 secs   6a. Motor Leg, Left  0-->No drift, leg holds 30 degree position for full 5 secs   6b. Motor Leg, Right  0-->No drift, leg holds 30 degree position for full 5 secs   7. Limb Ataxia  2-->Present in two limbs   8. Sensory  2-->Severe to total sensory loss, patient is not aware of being touched in the face, arm, and leg   9. Best Language  1-->Mild-to-moderate aphasia, some obvious loss of fluency or facility of comprehension, without significant limitation on ideas expressed or form of expression. Reduction of speech and/or comprehension, however, makes conversation. . . (see row details)   10. Dysarthria  1-->Mild-to-moderate dysarthria, patient slurs at least some words and, at worst, can be understood with some difficulty   11. Extinction and Inattention (formerly Neglect)  1-->Visual, tactile, auditory, spatial, or personal inattention or extinction to bilateral simultaneous stimulation in one of the sensory modalities   Total (NIH Stroke Scale)  13          Modified Sharmaine Score: (unknown)  Allison Coma Scale:    ABCD2 Score:    [id number] Score:   HAS -BLED Score:   ICH Score:   Hunt & Cook Classification:      Hemorrhagic change of an Ischemic Stroke: Does this patient have an ischemic stroke with hemorrhagic changes? Yes, see MRI     Neurologic Chief Complaint: Embolic stroke involving left middle cerebral artery    Subjective:     Interval  History: Patient is seen for follow-up neurological assessment and treatment recommendations: NAEO. S/p successful LM1 thrombectomy. Able to follow commands. Still with expressive aphasia.     HPI, Past Medical, Family, and Social History remains the same as documented in the initial encounter.     Review of Systems  Scheduled Meds:   aspirin  81 mg Oral Daily    atorvastatin  40 mg Oral QHS    carvedilol  6.25 mg Oral BID    senna-docusate 8.6-50 mg  1 tablet Oral BID     Continuous Infusions:   niCARdipine Stopped (12/07/19 1245)     PRN Meds:calcium gluconate IVPB, calcium gluconate IVPB, calcium gluconate IVPB, magnesium sulfate IVPB, magnesium sulfate IVPB, potassium chloride in water **AND** potassium chloride in water **AND** potassium chloride in water, sodium chloride 0.9%, sodium chloride 0.9%, sodium phosphate IVPB, sodium phosphate IVPB, sodium phosphate IVPB     Review of Systems   Constitutional: Positive for activity change.   Eyes: Negative for visual disturbance.   Respiratory: Negative for shortness of breath and wheezing.    Cardiovascular: Negative for chest pain.   Gastrointestinal: Negative for diarrhea, nausea and vomiting.   Skin: Negative for color change and pallor.   Neurological: Positive for speech difficulty, weakness and numbness. Negative for facial asymmetry.   Psychiatric/Behavioral: Positive for confusion and decreased concentration.     Objective:     Vital Signs (Most Recent):  Temp: 97.8 °F (36.6 °C) (12/07/19 1400)  Pulse: 80 (12/07/19 1500)  Resp: 20 (12/07/19 1500)  BP: 133/72 (12/07/19 1500)  SpO2: 95 % (12/07/19 1500)  BP Location: Right arm    Vital Signs Range (Last 24H):  Temp:  [97.7 °F (36.5 °C)-98.2 °F (36.8 °C)]   Pulse:  [70-96]   Resp:  [11-20]   BP: (121-157)/(58-91)   SpO2:  [90 %-100 %]   BP Location: Right arm    Physical Exam   Constitutional: He appears well-developed and well-nourished.   HENT:   Head: Normocephalic.   Eyes: Pupils are equal, round, and  reactive to light.   Left gaze preference    Neck: Full passive range of motion without pain.   Cardiovascular: Normal rate.   Pulmonary/Chest: Effort normal.   Abdominal: Soft.   Musculoskeletal:   Right sided weakness   Skin: Abrasion (right side of head) noted.   Psychiatric: His speech is slurred.         Neurological Exam:   LOC: alert  Attention Span: Good   Language: Expressive aphasia, Receptive aphasia, not following commands consistently, not able to answer simple questions  Articulation: Dysarthria  Orientation: moderate expressive aphasia  Visual Fields: Full  EOM (CN III, IV, VI): Gaze preference  left  Pupils (CN II, III): PERRL  Facial Sensation (CN V): Normal  Reflexes: 2+ throughout  Motor: Arm left  Normal 5/5  Leg left  Normal 5/5  Arm right  Paresis: 4/5  Leg right Paresis: 4/5  Cebellar: EVAN- not following commands consistently  Sensation: Tactile extinction to bilateral simultaneous stimulation   Tone: Normal tone throughout    Laboratory:  CMP:   Recent Labs   Lab 12/07/19  0307   CALCIUM 9.2   ALBUMIN 3.4*   PROT 6.9      K 3.8   CO2 24      BUN 14   CREATININE 0.8   ALKPHOS 65   ALT 18   AST 23   BILITOT 0.6     CBC:   Recent Labs   Lab 12/07/19  0307   WBC 9.98   RBC 4.55*   HGB 13.6*   HCT 41.4      MCV 91   MCH 29.9   MCHC 32.9     Lipid Panel:   Recent Labs   Lab 12/06/19  0817   CHOL 218*   LDLCALC 144.0   HDL 53   TRIG 105     Coagulation:   Recent Labs   Lab 12/07/19  0307   INR 1.0   APTT 25.6     Hgb A1C:   Recent Labs   Lab 12/06/19  1635   HGBA1C 5.7*     TSH:   Recent Labs   Lab 12/06/19  0817   TSH 0.976       Diagnostic Results     Brain imaging:     CT head 12/06/2019    Ill-defined regions of decreased attenuation left MCA territory specifically left insula, left posterior temporoparietal cortex and lateral aspect of the left lentiform nucleus most compatible with acute areas of infarction.  There is no significant mass effect or midline shift.  No evidence  for hemorrhagic conversion.    There is slight hyperdensity associated with the intracranial vasculature likely related to recent contrast administration for outside institution angiography      Cardiac Evaluation:   · Normal left ventricular systolic function. The estimated ejection fraction is 65%  · Eccentric left ventricular hypertrophy.  · Indeterminate left ventricular diastolic function.  · Normal right ventricular systolic function.  · Mild aortic valve stenosis. peak velocity is 2.94 m/s; mean gradient is 20 mmHg. ABBIE cannot be measured accurately.  · Normal central venous pressure (3 mm Hg).  · Mild mitral regurgitation.  · No wall motion abnormalities.    MRI Brain  1. Left MCA distribution infarct with hemorrhagic conversion in the insula and temporal lobe.  Associated localized mass effect without midline shift.  2. Chronic microvascular ischemic change.         Bessie Hall MD  Comprehensive Stroke Center  Department of Vascular Neurology   [facility]

## 2019-12-07 NOTE — PROGRESS NOTES
Friend Curly Worley called unit with update information:  Riaz Lane  1960    Admit and [facility] notified and chart updated.

## 2019-12-07 NOTE — PLAN OF CARE
Problem: Oral Intake Inadequate  Goal: Improved Oral Intake  Outcome: Ongoing, Progressing     Recommendations     Recommendation/Intervention:   1.) ADAT to regular; texture per SLP.   2.) If PO intakes <50% of meals, suggest Boost Plus.   3.) Suggest MVI.   4.) Daily weights     Goals: 1.) Pt to return to nutritionally adequate diet by follow up.   Nutrition Goal Status: new  Communication of RD Recs: (POC)

## 2019-12-07 NOTE — PROGRESS NOTES
Yazdanism Friend at bedside, Patient tolerated 100 % of the lunch served, Patient remain stable and comfortable

## 2019-12-08 LAB
ALBUMIN SERPL BCP-MCNC: 3.2 G/DL (ref 3.5–5.2)
ALP SERPL-CCNC: 62 U/L (ref 55–135)
ALT SERPL W/O P-5'-P-CCNC: 15 U/L (ref 10–44)
ANION GAP SERPL CALC-SCNC: 10 MMOL/L (ref 8–16)
AST SERPL-CCNC: 15 U/L (ref 10–40)
BASOPHILS # BLD AUTO: 0.07 K/UL (ref 0–0.2)
BASOPHILS NFR BLD: 0.6 % (ref 0–1.9)
BILIRUB SERPL-MCNC: 0.4 MG/DL (ref 0.1–1)
BUN SERPL-MCNC: 16 MG/DL (ref 6–20)
CALCIUM SERPL-MCNC: 9.2 MG/DL (ref 8.7–10.5)
CHLORIDE SERPL-SCNC: 107 MMOL/L (ref 95–110)
CO2 SERPL-SCNC: 23 MMOL/L (ref 23–29)
CREAT SERPL-MCNC: 0.8 MG/DL (ref 0.5–1.4)
DIFFERENTIAL METHOD: ABNORMAL
EOSINOPHIL # BLD AUTO: 0.1 K/UL (ref 0–0.5)
EOSINOPHIL NFR BLD: 1 % (ref 0–8)
ERYTHROCYTE [DISTWIDTH] IN BLOOD BY AUTOMATED COUNT: 13.9 % (ref 11.5–14.5)
EST. GFR  (AFRICAN AMERICAN): >60 ML/MIN/1.73 M^2
EST. GFR  (NON AFRICAN AMERICAN): >60 ML/MIN/1.73 M^2
GLUCOSE SERPL-MCNC: 119 MG/DL (ref 70–110)
HCT VFR BLD AUTO: 40.9 % (ref 40–54)
HGB BLD-MCNC: 12.9 G/DL (ref 14–18)
IMM GRANULOCYTES # BLD AUTO: 0.05 K/UL (ref 0–0.04)
IMM GRANULOCYTES NFR BLD AUTO: 0.4 % (ref 0–0.5)
LYMPHOCYTES # BLD AUTO: 2.6 K/UL (ref 1–4.8)
LYMPHOCYTES NFR BLD: 21.6 % (ref 18–48)
MAGNESIUM SERPL-MCNC: 1.9 MG/DL (ref 1.6–2.6)
MCH RBC QN AUTO: 29.5 PG (ref 27–31)
MCHC RBC AUTO-ENTMCNC: 31.5 G/DL (ref 32–36)
MCV RBC AUTO: 93 FL (ref 82–98)
MONOCYTES # BLD AUTO: 1 K/UL (ref 0.3–1)
MONOCYTES NFR BLD: 8.6 % (ref 4–15)
NEUTROPHILS # BLD AUTO: 8.1 K/UL (ref 1.8–7.7)
NEUTROPHILS NFR BLD: 67.8 % (ref 38–73)
NRBC BLD-RTO: 0 /100 WBC
PHOSPHATE SERPL-MCNC: 3.1 MG/DL (ref 2.7–4.5)
PLATELET # BLD AUTO: 244 K/UL (ref 150–350)
PMV BLD AUTO: 9.8 FL (ref 9.2–12.9)
POCT GLUCOSE: 101 MG/DL (ref 70–110)
POCT GLUCOSE: 104 MG/DL (ref 70–110)
POTASSIUM SERPL-SCNC: 4 MMOL/L (ref 3.5–5.1)
PROT SERPL-MCNC: 6.8 G/DL (ref 6–8.4)
RBC # BLD AUTO: 4.38 M/UL (ref 4.6–6.2)
SODIUM SERPL-SCNC: 140 MMOL/L (ref 136–145)
WBC # BLD AUTO: 11.95 K/UL (ref 3.9–12.7)

## 2019-12-08 PROCEDURE — 25000003 PHARM REV CODE 250: Performed by: NURSE PRACTITIONER

## 2019-12-08 PROCEDURE — 99232 SBSQ HOSP IP/OBS MODERATE 35: CPT | Mod: ,,, | Performed by: NURSE PRACTITIONER

## 2019-12-08 PROCEDURE — 80053 COMPREHEN METABOLIC PANEL: CPT

## 2019-12-08 PROCEDURE — 84100 ASSAY OF PHOSPHORUS: CPT

## 2019-12-08 PROCEDURE — 99233 PR SUBSEQUENT HOSPITAL CARE,LEVL III: ICD-10-PCS | Mod: ,,, | Performed by: PSYCHIATRY & NEUROLOGY

## 2019-12-08 PROCEDURE — 63600175 PHARM REV CODE 636 W HCPCS: Performed by: NURSE PRACTITIONER

## 2019-12-08 PROCEDURE — 25000003 PHARM REV CODE 250: Performed by: STUDENT IN AN ORGANIZED HEALTH CARE EDUCATION/TRAINING PROGRAM

## 2019-12-08 PROCEDURE — 99232 PR SUBSEQUENT HOSPITAL CARE,LEVL II: ICD-10-PCS | Mod: ,,, | Performed by: NURSE PRACTITIONER

## 2019-12-08 PROCEDURE — 94761 N-INVAS EAR/PLS OXIMETRY MLT: CPT

## 2019-12-08 PROCEDURE — 25000003 PHARM REV CODE 250: Performed by: PSYCHIATRY & NEUROLOGY

## 2019-12-08 PROCEDURE — 83735 ASSAY OF MAGNESIUM: CPT

## 2019-12-08 PROCEDURE — 85025 COMPLETE CBC W/AUTO DIFF WBC: CPT

## 2019-12-08 PROCEDURE — 99233 SBSQ HOSP IP/OBS HIGH 50: CPT | Mod: ,,, | Performed by: PSYCHIATRY & NEUROLOGY

## 2019-12-08 PROCEDURE — 11000001 HC ACUTE MED/SURG PRIVATE ROOM

## 2019-12-08 RX ORDER — HEPARIN SODIUM 5000 [USP'U]/ML
5000 INJECTION, SOLUTION INTRAVENOUS; SUBCUTANEOUS EVERY 8 HOURS
Status: DISCONTINUED | OUTPATIENT
Start: 2019-12-08 | End: 2019-12-17 | Stop reason: HOSPADM

## 2019-12-08 RX ORDER — NICARDIPINE HYDROCHLORIDE 0.2 MG/ML
5 INJECTION INTRAVENOUS CONTINUOUS
Status: DISCONTINUED | OUTPATIENT
Start: 2019-12-08 | End: 2019-12-08

## 2019-12-08 RX ADMIN — CARVEDILOL 6.25 MG: 6.25 TABLET, FILM COATED ORAL at 09:12

## 2019-12-08 RX ADMIN — HEPARIN SODIUM 5000 UNITS: 5000 INJECTION INTRAVENOUS; SUBCUTANEOUS at 04:12

## 2019-12-08 RX ADMIN — ASPIRIN 81 MG: 81 TABLET, COATED ORAL at 09:12

## 2019-12-08 RX ADMIN — SENNOSIDES AND DOCUSATE SODIUM 1 TABLET: 8.6; 5 TABLET ORAL at 09:12

## 2019-12-08 RX ADMIN — ACETAMINOPHEN 650 MG: 325 TABLET ORAL at 05:12

## 2019-12-08 RX ADMIN — ACETAMINOPHEN 650 MG: 325 TABLET ORAL at 09:12

## 2019-12-08 RX ADMIN — BUTALBITAL, ACETAMINOPHEN AND CAFFEINE 1 TABLET: 50; 325; 40 TABLET ORAL at 12:12

## 2019-12-08 RX ADMIN — HEPARIN SODIUM 5000 UNITS: 5000 INJECTION INTRAVENOUS; SUBCUTANEOUS at 09:12

## 2019-12-08 RX ADMIN — ATORVASTATIN CALCIUM 40 MG: 20 TABLET, FILM COATED ORAL at 09:12

## 2019-12-08 NOTE — NURSING TRANSFER
Nursing Transfer Note      12/7/2019     Transfer : [id number]    Transfer via bed    Transfer with monitor    Transported by RN/PCT    Medicines sent: none    Chart send with patient: YES     Notified: no family     Patient reassessed at: 12/07/2019    Telephone report called.

## 2019-12-08 NOTE — PROGRESS NOTES
[facility]  Neurocritical Care  Progress Note    Admit Date: 12/6/2019  Service Date: 12/07/2019  Length of Stay: 1    Subjective:     Chief Complaint: Embolic stroke involving left middle cerebral artery    History of Present Illness: Patient is a 60 year old male with unknown medical history who was admitted for L MCA presented to [place] ED after he fell from a chair at a bar. He was noted to have aphasia and right sided weakness. Workup was significant for L MCA occlusion (per notes) and he was transported to [facility] for possible intervention. Last known normal was 23:30 on 12/5, no tPA was given.. He is s/p thombectomy, TICI 2b. He is being admitted to [facility] for post procedure management.     Hospital Course: 12/6: s/p thombectomy TICI 2b.   12/07/2019 heme conversion on repeat scan    Interval History:  Post thrombectomy day #1. Remains with expressive aphasia and agnosia. MRI complete noting Left MCA distribution infarct with hemorrhagic conversion in the insula and temporal lobe; associated localized mass effect without midline shift. Will schedule F/U CT in am to evaluate heme conversion. Will reevaluate start of antiplatelets tomorrow. Cardene weaned off and carvedilol started to maintain SBP < 160. Administration of statin. Continue in ICU for close monitoring.     Review of Systems   Constitutional: Positive for activity change.   HENT: Positive for voice change.    Respiratory: Negative.    Cardiovascular: Negative.    Gastrointestinal: Negative.    Endocrine: Negative.    Genitourinary: Negative.    Musculoskeletal: Negative.    Skin: Negative.    Neurological: Positive for speech difficulty and weakness.   Hematological: Negative.    Psychiatric/Behavioral: Negative.        Objective:     Vitals:  Temp: 99.1 °F (37.3 °C)  Pulse: 80  Rhythm: normal sinus rhythm  BP: (!) 146/92  MAP (mmHg): 114  Resp: 17  SpO2: (!) 94 %  O2 Device (Oxygen Therapy): room air    Temp  Min: 97.6 °F (36.4  °C)  Max: 99.1 °F (37.3 °C)  Pulse  Min: 70  Max: 96  BP  Min: 121/58  Max: 153/83  MAP (mmHg)  Min: 83  Max: 115  Resp  Min: 12  Max: 20  SpO2  Min: 90 %  Max: 100 %    12/06 0701 - 12/07 0700  In: 1207.9 [I.V.:557.9]  Out: 1150 [Urine:1150]   Unmeasured Output  Urine Occurrence: 2       Physical Exam   Constitutional: He appears well-developed and well-nourished.   HENT:   Head: Normocephalic and atraumatic.   Eyes: Pupils are equal, round, and reactive to light. EOM are normal.   Neck: Normal range of motion. Neck supple.   Cardiovascular: Normal rate.   Pulmonary/Chest: Effort normal and breath sounds normal. No respiratory distress.   Abdominal: Soft. Bowel sounds are normal. He exhibits no distension.   Musculoskeletal: Normal range of motion.   Neurological:   Mental Status:  Awake, follows commands    + Aphasia Dysarthria  Pupils 3 mm, PERRL.   +Corneal reflex, +gag, +cough   Equal strength throughout       Nursing note and vitals reviewed.    Unable to test judgment, insight, fund of knowledge, coordination, gait due to level of consciousness.    Medications:  Continuous  niCARdipine Last Rate: Stopped (12/07/19 1245)   Scheduled  aspirin 81 mg Daily   atorvastatin 40 mg QHS   carvedilol 6.25 mg BID   senna-docusate 8.6-50 mg 1 tablet BID   PRN  calcium gluconate IVPB 1 g PRN   calcium gluconate IVPB 1 g PRN   calcium gluconate IVPB 1 g PRN   magnesium sulfate IVPB 2 g PRN   magnesium sulfate IVPB 4 g PRN   potassium chloride in water 40 mEq PRN   And     potassium chloride in water 60 mEq PRN   And     potassium chloride in water 80 mEq PRN   sodium chloride 0.9% 10 mL PRN   sodium chloride 0.9% 10 mL PRN   sodium phosphate IVPB 15 mmol PRN   sodium phosphate IVPB 20.01 mmol PRN   sodium phosphate IVPB 30 mmol PRN     Today I personally reviewed pertinent medications, lines/drains/airways, imaging, laboratory results, notably:    Diet  Diet Adult Regular (IDDSI Level 7) [facility]  Diet Adult Regular  (IDDSI Level 7) [facility]        Assessment/Plan:     Neuro  * Embolic stroke involving left middle cerebral artery  60 year old gentleman with unknown medical history admitted for L MCA occlusion s/p thrombectomy with TICI2b flow.   - Neurochecks q1h  - Vitals q1h  - I/O  - SBP <140  - MRI with heme conversion  Continue PT/OT/SLP    Cytotoxic cerebral edema  Cytotoxic cerebral edema identified on imaging studies consistent with ischemic stroke in within left MCA territory and mild hemorrhagic transformation noted on GRE    Maintain Eunatremia  Neuro Checks Q1 hr    Cardiac/Vascular  Mixed hyperlipidemia  Monitor Lipid Panel   Continue Statin      Other  Decreased strength, endurance, and mobility  2/2 L MCA  PT/OT    Aphasia due to acute cerebrovascular accident (CVA)  2/2 L MCA stroke  SLP evaluate and treat          The patient is being Prophylaxed for:  Venous Thromboembolism with: Mechanical  Stress Ulcer with: None  Ventilator Pneumonia with: none    Activity Orders          Diet Adult Regular (IDDSI Level 7) [facility]: Regular starting at 12/07 1037        Full Code    Valentine English NP  Neurocritical Care  [facility]

## 2019-12-08 NOTE — PROGRESS NOTES
[facility]  Vascular Neurology  Comprehensive Stroke Center  Progress Note    Assessment/Plan:     * Embolic stroke involving left middle cerebral artery  60 y.o. yo male with unknown past medical history, who presented to [place] with AMS s/p fall. LKN ~1130 pm pn 12/5. CTA completed at OSH revealed left MCA proximal occlusion. Patient transferred [place] [facility] [place]. Patient VAN + on arrival to [facility]. CTH with ASPECTS ~7. Patient taken to IR and is now s/p successful thrombectomy with TICI 2b flow. Admitted to [facility]CC for close monitoring/higher level of care.     Noted Left MCA distribution infarct with hemorrhagic conversion in the insula and temporal lobe on MRI. Improved neuro assessment today- improvement in expressive aphasia.     Antithrombotics for secondary stroke prevention: ASA 81mg QD    Statins for secondary stroke prevention and hyperlipidemia, if present:   Statins: Atorvastatin- 40 mg daily    Aggressive risk factor modification: HLD,       Rehab efforts: The patient has been evaluated by a stroke team provider and the therapy needs have been fully considered based off the presenting complaints and exam findings. The following therapy evaluations are needed: PT evaluate and treat, OT evaluate and treat, SLP evaluate and treat, PM&R evaluate for appropriate placement    Diagnostics ordered/pending: none    VTE prophylaxis: Heparin 5000 units SQ every 8 hours    BP parameters: Infarct: Post sucessful thrombectomy, SBP <140        Mixed hyperlipidemia  -stroke risk factor         Recommend Atorvastatin 40 mg          12/08/2019 improvement of expressive aphasia today     STROKE DOCUMENTATION   Acute Stroke Times   Last Known Normal Date: 12/05/19  Last Known Normal Time: 2330  Stroke Team Called Date: 12/06/19  Stroke Team Called Time: 0800  Stroke Team Arrival Date: 12/06/19  Stroke Team Arrival Time: 0804  CT Interpretation Time: 0810  Decision to Treat  Time for Alteplase: (N/A)    NIH Scale:  Current NIH Stroke Score Values      Most Recent Value   Interval  1hour post tPA or Endovascular procedure completion   1a. Level of Consciousness  0-->Alert, keenly responsive   1b. LOC Questions  0-->Answers both questions correctly   1c. LOC Commands  1-->Performs one task correctly   2. Best Gaze  0-->Normal   3. Visual  0-->No visual loss   4. Facial Palsy  0-->Normal symmetrical movements   5a. Motor Arm, Left  0-->No drift, limb holds 90 (or 45) degrees for full 10 secs   5b. Motor Arm, Right  0-->No drift, limb holds 90 (or 45) degrees for full 10 secs   6a. Motor Leg, Left  0-->No drift, leg holds 30 degree position for full 5 secs   6b. Motor Leg, Right  0-->No drift, leg holds 30 degree position for full 5 secs   7. Limb Ataxia  0-->Absent   8. Sensory  0-->Normal, no sensory loss   9. Best Language  1-->Mild-to-moderate aphasia, some obvious loss of fluency or facility of comprehension, without significant limitation on ideas expressed or form of expression. Reduction of speech and/or comprehension, however, makes conversation. . . (see row details)   10. Dysarthria  0-->Normal   11. Extinction and Inattention (formerly Neglect)  0-->No abnormality   Total (NIH Stroke Scale)  2          Modified Southold Score: (unknown)  Allison Coma Scale:    ABCD2 Score:    [id number] Score:   HAS -BLED Score:   ICH Score:   Hunt & Cook Classification:      Hemorrhagic change of an Ischemic Stroke: Does this patient have an ischemic stroke with hemorrhagic changes? No     Neurologic Chief Complaint: Embolic stroke involving left middle cerebral artery    Subjective:     Interval History: Patient is seen for follow-up neurological assessment and treatment recommendations: NAEO. Expressive aphasia improving. Following commands.     HPI, Past Medical, Family, and Social History remains the same as documented in the initial encounter.     Review of Systems    Scheduled Meds:    aspirin  81 mg Oral Daily    atorvastatin  40 mg Oral QHS    carvedilol  6.25 mg Oral BID    heparin (porcine)  5,000 Units Subcutaneous Q8H    senna-docusate 8.6-50 mg  1 tablet Oral BID     Continuous Infusions:    PRN Meds:acetaminophen, calcium gluconate IVPB, calcium gluconate IVPB, calcium gluconate IVPB, magnesium sulfate IVPB, magnesium sulfate IVPB, potassium chloride in water **AND** potassium chloride in water **AND** potassium chloride in water, sodium chloride 0.9%, sodium chloride 0.9%, sodium phosphate IVPB, sodium phosphate IVPB, sodium phosphate IVPB     Review of Systems   Constitutional: Positive for activity change.   Eyes: Negative for visual disturbance.   Respiratory: Negative for shortness of breath and wheezing.    Cardiovascular: Negative for chest pain.   Gastrointestinal: Negative for diarrhea, nausea and vomiting.   Skin: Negative for color change and pallor.   Neurological: Positive for speech difficulty, weakness and numbness. Negative for facial asymmetry.   Psychiatric/Behavioral: Positive for confusion and decreased concentration.     Objective:     Vital Signs (Most Recent):  Temp: 98.4 °F (36.9 °C) (12/08/19 0701)  Pulse: 68 (12/08/19 0900)  Resp: 18 (12/08/19 0900)  BP: (!) 149/73 (12/08/19 0900)  SpO2: 98 % (12/08/19 0900)  BP Location: Right arm    Vital Signs Range (Last 24H):  Temp:  [97.6 °F (36.4 °C)-99.1 °F (37.3 °C)]   Pulse:  [65-96]   Resp:  [14-20]   BP: (121-153)/(58-92)   SpO2:  [91 %-100 %]   BP Location: Right arm    Physical Exam   Constitutional: He appears well-developed and well-nourished.   HENT:   Head: Normocephalic.   Eyes: Pupils are equal, round, and reactive to light.   Left gaze preference    Neck: Full passive range of motion without pain.   Cardiovascular: Normal rate.   Pulmonary/Chest: Effort normal.   Abdominal: Soft.   Musculoskeletal:   Right sided weakness   Skin: Abrasion (right side of head) noted.   Psychiatric: His speech is  normal        Neurological Exam:   LOC: alert  Attention Span: Good   Language: improved expressive aphasia  Articulation: Dysarthria  Orientation: moderate expressive aphasia  Visual Fields: Full  EOM (CN III, IV, VI): Gaze preference  left  Pupils (CN II, III): PERRL  Facial Sensation (CN V): Normal  Reflexes: 2+ throughout  Motor: Arm left  Normal 5/5  Leg left  Normal 5/5  Arm right  Paresis: 4/5  Leg right Paresis: 4/5  Cebellar: EVAN- not following commands consistently  Sensation: Tactile extinction to bilateral simultaneous stimulation   Tone: Normal tone throughout    Laboratory:  CMP:   Recent Labs   Lab 12/08/19  0150   CALCIUM 9.2   ALBUMIN 3.2*   PROT 6.8      K 4.0   CO2 23      BUN 16   CREATININE 0.8   ALKPHOS 62   ALT 15   AST 15   BILITOT 0.4     CBC:   Recent Labs   Lab 12/08/19  0150   WBC 11.95   RBC 4.38*   HGB 12.9*   HCT 40.9      MCV 93   MCH 29.5   MCHC 31.5*     Lipid Panel:   Recent Labs   Lab 12/06/19  0817   CHOL 218*   LDLCALC 144.0   HDL 53   TRIG 105     Coagulation:   Recent Labs   Lab 12/07/19  0307   INR 1.0   APTT 25.6     Hgb A1C:   Recent Labs   Lab 12/06/19  1635   HGBA1C 5.7*     TSH:   Recent Labs   Lab 12/06/19  0817   TSH 0.976       Diagnostic Results     Brain imaging:     CT head 12/06/2019    Ill-defined regions of decreased attenuation left MCA territory specifically left insula, left posterior temporoparietal cortex and lateral aspect of the left lentiform nucleus most compatible with acute areas of infarction.  There is no significant mass effect or midline shift.  No evidence for hemorrhagic conversion.    There is slight hyperdensity associated with the intracranial vasculature likely related to recent contrast administration for outside institution angiography      Cardiac Evaluation:   · Normal left ventricular systolic function. The estimated ejection fraction is 65%  · Eccentric left ventricular hypertrophy.  · Indeterminate left ventricular  diastolic function.  · Normal right ventricular systolic function.  · Mild aortic valve stenosis. peak velocity is 2.94 m/s; mean gradient is 20 mmHg. ABBIE cannot be measured accurately.  · Normal central venous pressure (3 mm Hg).  · Mild mitral regurgitation.  · No wall motion abnormalities.    MRI Brain  1. Left MCA distribution infarct with hemorrhagic conversion in the insula and temporal lobe.  Associated localized mass effect without midline shift.  2. Chronic microvascular ischemic change.       Bessie Hall MD  Comprehensive Stroke Center  Department of Vascular Neurology   [facility]

## 2019-12-08 NOTE — NURSING TRANSFER
Nursing Transfer Note      12/8/2019     Transfer from Neuro ICU to 957    Transfer via bed    Transfer with cardiac monitoring, patients belonging    Transported by Irma JONES    Medicines sent: N/A    Chart send with patient: YES     Notified: Pt will notify family    Patient reassessed upon arrival    Upon arrival to floor: vital signs taken,  Tele resumed, pt oriented to room, call bed in reach and bed in lowest position. No changes noted from report received.

## 2019-12-08 NOTE — PROGRESS NOTES
Patient arrived to [facility] from [facility] [place] by bed via 1 RN and 1 transporter    Type of stroke/diagnosis:  Embolic L MCA    TPA start and end time : n/a    Thrombectomy start and end time: start time 0846 on 12/6. End time 0910 on 12/6    Current symptoms: expressive aphasia and HA    Skin assessment done: Y  Wounds noted: R groin puncture site    TAMARA Armband Applied: No, pt passed TAMARA    NCC notified: MD Giovany

## 2019-12-08 NOTE — ASSESSMENT & PLAN NOTE
2/2 L MCA stroke  SLP evaluate and treat   Health Maintenance Due   Topic Date Due   • DTaP/Tdap/Td Vaccine (1 - Tdap) 10/[birth year]/1962   • Osteoporosis Screening  10/25/2008   • Medicare Wellness 65+  06/27/2019   • Depression Screening  06/27/2019       Patient is due for topics listed above, she wishes to proceed with Depression Screening  and MWV (Medicare Wellness Visit), but is not proceeding with Immunization(s) Dtap/Tdap/Td and Osteoporosis screening at this time. The following has occured:

## 2019-12-08 NOTE — PLAN OF CARE
POC reviewed with pt at 0400. Pt verbalized understanding, but needs reinforcement. Questions and concerns addressed. No acute events overnight. Follow up CT complete. Pt progressing toward goals. Will continue to monitor. See flowsheets for full assessment and VS info       Problem: Adult Inpatient Plan of Care  Goal: Plan of Care Review  Outcome: Ongoing, Progressing  Flowsheets (Taken 12/8/2019 0448)  Plan of Care Reviewed With: patient     Problem: Fall Injury Risk  Goal: Absence of Fall and Fall-Related Injury  Outcome: Ongoing, Progressing     Problem: Adjustment to Illness (Stroke, Ischemic/Transient Ischemic Attack)  Goal: Optimal Coping  Outcome: Ongoing, Progressing  Intervention: Support Patient/Family Psychosocial Response to Stroke  Flowsheets (Taken 12/8/2019 0448)  Supportive Measures: active listening utilized; positive reinforcement provided; problem solving facilitated; relaxation techniques promoted; verbalization of feelings encouraged  Family/Support System Care: involvement promoted; presence promoted     Problem: Cerebral Tissue Perfusion Risk (Stroke, Ischemic/Transient Ischemic Attack)  Goal: Optimal Cerebral Tissue Perfusion  Outcome: Ongoing, Progressing     Problem: Functional Ability Impaired (Stroke, Ischemic/Transient Ischemic Attack)  Goal: Optimal Functional Ability  Outcome: Ongoing, Progressing  Intervention: Optimize Functional Ability  Flowsheets (Taken 12/8/2019 0448)  Self-Care Promotion: independence encouraged  Activity Management: activity adjusted per tolerance

## 2019-12-08 NOTE — ASSESSMENT & PLAN NOTE
60 y.o. yo male with unknown past medical history, who presented to [facility] with AMS s/p fall. LKN ~1130 pm pn 12/5. CTA completed at OSH revealed left MCA proximal occlusion. Patient transferred to [facility] for possible intervention. Patient VAN + on arrival to [facility]. CTH with ASPECTS ~7. Patient taken to IR and is now s/p successful thrombectomy with TICI 2b flow. Admitted to [facility] for close monitoring/higher level of care.     Noted Left MCA distribution infarct with hemorrhagic conversion in the insula and temporal lobe on MRI. Improved neuro assessment today- improvement in expressive aphasia.     Antithrombotics for secondary stroke prevention: ASA 81mg QD    Statins for secondary stroke prevention and hyperlipidemia, if present:   Statins: Atorvastatin- 40 mg daily    Aggressive risk factor modification: HLD,       Rehab efforts: The patient has been evaluated by a stroke team provider and the therapy needs have been fully considered based off the presenting complaints and exam findings. The following therapy evaluations are needed: PT evaluate and treat, OT evaluate and treat, SLP evaluate and treat, PM&R evaluate for appropriate placement    Diagnostics ordered/pending: none    VTE prophylaxis: Heparin 5000 units SQ every 8 hours    BP parameters: Infarct: Post sucessful thrombectomy, SBP <140

## 2019-12-08 NOTE — ASSESSMENT & PLAN NOTE
60 year old gentleman with unknown medical history admitted for L MCA occlusion s/p thrombectomy with TICI2b flow.   - Neurochecks q1h  - Vitals q1h  - I/O  - SBP <140  - MRI with heme conversion  Continue PT/OT/SLP

## 2019-12-08 NOTE — PT/OT/SLP EVAL
Speech Language Pathology Evaluation  Cognitive/Bedside Swallow    Patient Name:  Riaz Lane   MRN:  [id number]  Admitting Diagnosis: Embolic stroke involving left middle cerebral artery    Recommendations:                  General Recommendations:  Dysphagia therapy, Speech/language therapy and Cognitive-linguistic therapy  Diet recommendations:  Dental Soft, Thin   Aspiration Precautions: 1 bite/sip at a time, Alternating bites/sips, Avoid talking while eating, Check for pocketing/oral residue, Eliminate distractions, Feed only when awake/alert, HOB to 90 degrees, Meds whole 1 at a time, Remain upright 30 minutes post meal, Small bites/sips and Strict aspiration precautions Continue to monitor for signs and symptoms of aspiration and discontinue oral feeding should you notice any of the following: watery eyes, reddened facial area, wet vocal quality, increased work of breathing, change in respiratory status, increased congestion, coughing, fever, etc.  General Precautions: Standard, aphasia, aspiration, fall  Communication strategies:  provide increased time to answer, go to room if call light pushed and use visual cues/gestures to clarify 2/2 decreased expressive/receptive language     History:     History reviewed. No pertinent past medical history.    History reviewed. No pertinent surgical history.    Social/Occupation/hobbies/homemaking History: Limited 2/2 Aphasia and no family present in PACU    Prior Intubation HX:  12/7/19    MRI: 12/7/19: 1. Left MCA distribution infarct with hemorrhagic conversion in the insula and temporal lobe.  Associated localized mass effect without midline shift.  2. Chronic microvascular ischemic change.  This report was flagged in Epic as abnormal.    Chest X-Rays: 12/6/19: No acute abnormality.    No radiopaque foreign body.    Prior diet: reg/thin.    Subjective     SLP reviewed Pt with RN and MD team, Pt cleared for PO trials  RN confirmed PT passed TAMARA and started on regular  "diet  Pt presents calm  He explains "Riaz"    Pain/Comfort:  ·  Pt pointed to R cheek, neck when asked about pain, findings reviewed with RN    Objective:     Cognitive Status:    Unable to assess 2/2 Aphasia. Pt oriented to person and year. Pt answered orientation questions via YNQ and open-responses.     Receptive Language:   Comprehension:      Questions Simple yes/no 70% accuracy I'ly  Commands  One step 60% accuracy, I'ly    Pragmatics:    Normal eye contact and affect. Assessment of topic maintenance ongoing 2/2 decreased verbal expression     Expressive Language:  Verbal:    Automatic Speech  Counting WNL for 1-5, Days of the week : Pt named 2/7 with moderate phonemic cues in unison with ST  and Months of the year : DEP  Initiation : intermittenly delayed   Repetition Words : 80% accuracy I'ly   Naming Confrontation : 25% accuracy with objects , I'ly  and Convergent : 33% accuracy, I'ly  Pt with  Moderate word-finding difficulty across naming tasks as characterized by phonemic and semantic paraphasias and neologisms.     Nonverbal:   Pt imitated gestures in unison with ST, pointed in unison with ST, ST to continue to assess as comprehension improves       Motor Speech:  WNL. Pt with appropriate rate of speech and 100% intellgibility.     Voice:   Quality: clear  Intensity: adequate    Visual-Spatial:  TBA  Reading:   TBA     Written Expression:   TBA    Oral Musculature Evaluation  · Oral Musculature: right weakness, other (see comments)(decreased R sensation )  · Dentition: present and adequate  · Secretion Management: adequate  · Mucosal Quality: adequate  · Mandibular Strength and Mobility: impaired  · Oral Labial Strength and Mobility: functional retraction  · Lingual Strength and Mobility: functional strength  · Volitional Cough: present   · Volitional Swallow: timely   · Voice Prior to PO Intake: clear, adequate intensity    Bedside Swallow Eval:   Consistencies Assessed:  · Thin liquids cup edge sips " single x4, continuous x1  · Puree : tsp bites x2  · Solids : bites of christiano cracker x4     Oral Phase:   · pt grimaced when chewing cracker x1   · Prolonged mastication    Pharyngeal Phase:   · no overt clinical signs/symptoms of aspiration    Compensatory Strategies  · small bites/sips, chew food on unaffected side     Treatment: Pt with mildly irritated R buccal cavity upon review of the oral mechanism and mildly decreased rotary chew. No voert S/S aspiration with trials presented. Pt educated on SLP role, dysphagia diets, diet recs, compensatory strategies for fx communication and ongoing SLP POC. Pt with minimal verbal understanding and would benefit from ongoing review/practice. RN and MD team notified of findings/recs. No questions noted. Pt remained in PACU with RN at bedside upon discontinuation of session.     Assessment:     Riaz Lane is a 59 y.o. male with an SLP diagnosis of MIld Oral Dysphagia and Aphasia.  He presents with mildly decreased sensation and decreased rotary chew with trials presented at the bedside. Strict aspiration precautions and good oral care advised for safety. ST to continue to follow. Upon d/c from acute, He would benefit from ongoing, intensive ST via IRF following d/c from acute to continue to improve swallow and communication for highest level of independence.     Goals:   Multidisciplinary Problems     SLP Goals        Problem: SLP Goal    Goal Priority Disciplines Outcome   SLP Goal     SLP Ongoing, Progressing   Description:  Speech Language Pathology Goals  Goals expected to be met by 12/14/19  1. Pt will tolerate dental soft diet with thin liquids with adequate oral clearance and no overt S/S aspiration, MOD I  2. Pt will complete automatic speech tasks with 90% accuracy, MIN A  3. Pt will name common objects with 75% accuracy or higher, MIN A  4. Pt will answer YNQ with 90% accuracy, MIN A  5. Pt will follow simple commands with 90% accuracy, MIN A  6. Pt will  participate in further assessment of cognition, reading and writing   7. Educate Pt and family on safety awareness and compensatory strategies for fx communication                       Plan:     · Patient to be seen:  4 x/week   · Plan of Care expires:  01/06/20  · Plan of Care reviewed with:  patient   · SLP Follow-Up:  Yes       Discharge recommendations:  Discharge Facility/Level of Care Needs: rehabilitation facility     Time Tracking:     SLP Treatment Date:   12/07/19  Speech Start Time:  1734  Speech Stop Time:  1752     Speech Total Time (min):  18 min    Billable Minutes: Eval 10  and Eval Swallow and Oral Function 8  GHADA Neal., [facility]-SLP  Speech-Language Pathology  Pager: [phone]      12/07/2019

## 2019-12-08 NOTE — ASSESSMENT & PLAN NOTE
Cytotoxic cerebral edema identified on imaging studies consistent with ischemic stroke in within left MCA territory and mild hemorrhagic transformation noted on GRE    Maintain Eunatremia  Neuro Checks Q1 hr

## 2019-12-08 NOTE — NURSING
Nursing Transfer Note       Transfer To 957    Transfer via bed    Transfer with cardiac monitoring    Transported by robert washington    Medicines sent: yes    Chart sent with patient: Yes    Notified: friend    Bedside Neuro assessment performed: Yes    Bedside Handoff given to: ROBERT Suggs    Upon arrival to floor: cardiac monitor applied, patient oriented to room, call bell in reach and bed in lowest position

## 2019-12-08 NOTE — HOSPITAL COURSE
Patient admitted to [facility] s/p mechanical thrombectomy with TICI 2B reperfusion.  Repeat imaging demonstrated a left MCA distribution infarct with hemorrhagic conversion in the insula and temporal lobe on MRI; not causing any clinical deterioration.  Patient stared on ASA 81mg and atorvastatin 40mg for secondary stroke prevention. Pt stepped down to stroke unit after an uneventful [facility] stay.  Patient was evaluated and treated by PT/OT/SLP who recommended outpatient therapy.  Patient currently homeless.  residential NH placement obtained.  Patient developed LUE cellulitis and was started on Bactrim DS.  Symptoms worsened and he was changed to Vanc.  Symptoms improved and he was transitioned to clindamycin oral at discharge.  Of note, Patient reported neck mass, which was evaluated with CT soft tissue. Concerning for neoplastic process per radiology.  Patient to follow up with Dr. Evans ENT as outpatient.

## 2019-12-08 NOTE — SUBJECTIVE & OBJECTIVE
Interval History:  Post thrombectomy day #1. Remains with expressive aphasia and agnosia. MRI complete noting Left MCA distribution infarct with hemorrhagic conversion in the insula and temporal lobe; associated localized mass effect without midline shift. Will schedule F/U CT in am to evaluate heme conversion. Will reevaluate start of antiplatelets tomorrow. Cardene weaned off and carvedilol started to maintain SBP < 160. Administration of statin. Continue in ICU for close monitoring.     Review of Systems   Constitutional: Positive for activity change.   HENT: Positive for voice change.    Respiratory: Negative.    Cardiovascular: Negative.    Gastrointestinal: Negative.    Endocrine: Negative.    Genitourinary: Negative.    Musculoskeletal: Negative.    Skin: Negative.    Neurological: Positive for speech difficulty and weakness.   Hematological: Negative.    Psychiatric/Behavioral: Negative.        Objective:     Vitals:  Temp: 99.1 °F (37.3 °C)  Pulse: 80  Rhythm: normal sinus rhythm  BP: (!) 146/92  MAP (mmHg): 114  Resp: 17  SpO2: (!) 94 %  O2 Device (Oxygen Therapy): room air    Temp  Min: 97.6 °F (36.4 °C)  Max: 99.1 °F (37.3 °C)  Pulse  Min: 70  Max: 96  BP  Min: 121/58  Max: 153/83  MAP (mmHg)  Min: 83  Max: 115  Resp  Min: 12  Max: 20  SpO2  Min: 90 %  Max: 100 %    12/06 0701 - 12/07 0700  In: 1207.9 [I.V.:557.9]  Out: 1150 [Urine:1150]   Unmeasured Output  Urine Occurrence: 2       Physical Exam   Constitutional: He appears well-developed and well-nourished.   HENT:   Head: Normocephalic and atraumatic.   Eyes: Pupils are equal, round, and reactive to light. EOM are normal.   Neck: Normal range of motion. Neck supple.   Cardiovascular: Normal rate.   Pulmonary/Chest: Effort normal and breath sounds normal. No respiratory distress.   Abdominal: Soft. Bowel sounds are normal. He exhibits no distension.   Musculoskeletal: Normal range of motion.   Neurological:   Mental Status:  Awake, follows commands     + Aphasia Dysarthria  Pupils 3 mm, PERRL.   +Corneal reflex, +gag, +cough   Equal strength throughout       Nursing note and vitals reviewed.    Unable to test judgment, insight, fund of knowledge, coordination, gait due to level of consciousness.    Medications:  Continuous  niCARdipine Last Rate: Stopped (12/07/19 1245)   Scheduled  aspirin 81 mg Daily   atorvastatin 40 mg QHS   carvedilol 6.25 mg BID   senna-docusate 8.6-50 mg 1 tablet BID   PRN  calcium gluconate IVPB 1 g PRN   calcium gluconate IVPB 1 g PRN   calcium gluconate IVPB 1 g PRN   magnesium sulfate IVPB 2 g PRN   magnesium sulfate IVPB 4 g PRN   potassium chloride in water 40 mEq PRN   And     potassium chloride in water 60 mEq PRN   And     potassium chloride in water 80 mEq PRN   sodium chloride 0.9% 10 mL PRN   sodium chloride 0.9% 10 mL PRN   sodium phosphate IVPB 15 mmol PRN   sodium phosphate IVPB 20.01 mmol PRN   sodium phosphate IVPB 30 mmol PRN     Today I personally reviewed pertinent medications, lines/drains/airways, imaging, laboratory results, notably:    Diet  Diet Adult Regular (IDDSI Level 7) [facility]  Diet Adult Regular (IDDSI Level 7) [facility]

## 2019-12-08 NOTE — SUBJECTIVE & OBJECTIVE
Neurologic Chief Complaint: Embolic stroke involving left middle cerebral artery    Subjective:     Interval History: Patient is seen for follow-up neurological assessment and treatment recommendations: NAEO. Expressive aphasia improving. Following commands.     HPI, Past Medical, Family, and Social History remains the same as documented in the initial encounter.     Review of Systems    Scheduled Meds:   aspirin  81 mg Oral Daily    atorvastatin  40 mg Oral QHS    carvedilol  6.25 mg Oral BID    heparin (porcine)  5,000 Units Subcutaneous Q8H    senna-docusate 8.6-50 mg  1 tablet Oral BID     Continuous Infusions:    PRN Meds:acetaminophen, calcium gluconate IVPB, calcium gluconate IVPB, calcium gluconate IVPB, magnesium sulfate IVPB, magnesium sulfate IVPB, potassium chloride in water **AND** potassium chloride in water **AND** potassium chloride in water, sodium chloride 0.9%, sodium chloride 0.9%, sodium phosphate IVPB, sodium phosphate IVPB, sodium phosphate IVPB     Review of Systems   Constitutional: Positive for activity change.   Eyes: Negative for visual disturbance.   Respiratory: Negative for shortness of breath and wheezing.    Cardiovascular: Negative for chest pain.   Gastrointestinal: Negative for diarrhea, nausea and vomiting.   Skin: Negative for color change and pallor.   Neurological: Positive for speech difficulty, weakness and numbness. Negative for facial asymmetry.   Psychiatric/Behavioral: Positive for confusion and decreased concentration.     Objective:     Vital Signs (Most Recent):  Temp: 98.4 °F (36.9 °C) (12/08/19 0701)  Pulse: 68 (12/08/19 0900)  Resp: 18 (12/08/19 0900)  BP: (!) 149/73 (12/08/19 0900)  SpO2: 98 % (12/08/19 0900)  BP Location: Right arm    Vital Signs Range (Last 24H):  Temp:  [97.6 °F (36.4 °C)-99.1 °F (37.3 °C)]   Pulse:  [65-96]   Resp:  [14-20]   BP: (121-153)/(58-92)   SpO2:  [91 %-100 %]   BP Location: Right arm    Physical Exam   Constitutional: He appears  well-developed and well-nourished.   HENT:   Head: Normocephalic.   Eyes: Pupils are equal, round, and reactive to light.   Left gaze preference    Neck: Full passive range of motion without pain.   Cardiovascular: Normal rate.   Pulmonary/Chest: Effort normal.   Abdominal: Soft.   Musculoskeletal:   Right sided weakness   Skin: Abrasion (right side of head) noted.   Psychiatric: His speech is slurred.         Neurological Exam:   LOC: alert  Attention Span: Good   Language: Expressive aphasia, Receptive aphasia, not following commands consistently, not able to answer simple questions  Articulation: Dysarthria  Orientation: moderate expressive aphasia  Visual Fields: Full  EOM (CN III, IV, VI): Gaze preference  left  Pupils (CN II, III): PERRL  Facial Sensation (CN V): Normal  Reflexes: 2+ throughout  Motor: Arm left  Normal 5/5  Leg left  Normal 5/5  Arm right  Paresis: 4/5  Leg right Paresis: 4/5  Cebellar: EVAN- not following commands consistently  Sensation: Tactile extinction to bilateral simultaneous stimulation   Tone: Normal tone throughout    Laboratory:  CMP:   Recent Labs   Lab 12/08/19  0150   CALCIUM 9.2   ALBUMIN 3.2*   PROT 6.8      K 4.0   CO2 23      BUN 16   CREATININE 0.8   ALKPHOS 62   ALT 15   AST 15   BILITOT 0.4     CBC:   Recent Labs   Lab 12/08/19  0150   WBC 11.95   RBC 4.38*   HGB 12.9*   HCT 40.9      MCV 93   MCH 29.5   MCHC 31.5*     Lipid Panel:   Recent Labs   Lab 12/06/19  0817   CHOL 218*   LDLCALC 144.0   HDL 53   TRIG 105     Coagulation:   Recent Labs   Lab 12/07/19  0307   INR 1.0   APTT 25.6     Hgb A1C:   Recent Labs   Lab 12/06/19  1635   HGBA1C 5.7*     TSH:   Recent Labs   Lab 12/06/19  0817   TSH 0.976       Diagnostic Results     Brain imaging:     CT head 12/06/2019    Ill-defined regions of decreased attenuation left MCA territory specifically left insula, left posterior temporoparietal cortex and lateral aspect of the left lentiform nucleus most  compatible with acute areas of infarction.  There is no significant mass effect or midline shift.  No evidence for hemorrhagic conversion.    There is slight hyperdensity associated with the intracranial vasculature likely related to recent contrast administration for outside institution angiography      Cardiac Evaluation:   · Normal left ventricular systolic function. The estimated ejection fraction is 65%  · Eccentric left ventricular hypertrophy.  · Indeterminate left ventricular diastolic function.  · Normal right ventricular systolic function.  · Mild aortic valve stenosis. peak velocity is 2.94 m/s; mean gradient is 20 mmHg. ABBIE cannot be measured accurately.  · Normal central venous pressure (3 mm Hg).  · Mild mitral regurgitation.  · No wall motion abnormalities.    MRI Brain  1. Left MCA distribution infarct with hemorrhagic conversion in the insula and temporal lobe.  Associated localized mass effect without midline shift.  2. Chronic microvascular ischemic change.         Physical Exam

## 2019-12-09 ENCOUNTER — RESEARCH ENCOUNTER (OUTPATIENT)
Dept: RESEARCH | Facility: HOSPITAL | Age: 59
End: 2019-12-09

## 2019-12-09 ENCOUNTER — RESEARCH ENCOUNTER (OUTPATIENT)
Dept: NEUROLOGY | Facility: CLINIC | Age: 59
End: 2019-12-09

## 2019-12-09 LAB
ALBUMIN SERPL BCP-MCNC: 3.3 G/DL (ref 3.5–5.2)
ALP SERPL-CCNC: 65 U/L (ref 55–135)
ALT SERPL W/O P-5'-P-CCNC: 12 U/L (ref 10–44)
ANION GAP SERPL CALC-SCNC: 8 MMOL/L (ref 8–16)
AST SERPL-CCNC: 14 U/L (ref 10–40)
BASOPHILS # BLD AUTO: 0.09 K/UL (ref 0–0.2)
BASOPHILS NFR BLD: 0.9 % (ref 0–1.9)
BILIRUB SERPL-MCNC: 0.6 MG/DL (ref 0.1–1)
BUN SERPL-MCNC: 12 MG/DL (ref 6–20)
CALCIUM SERPL-MCNC: 9.2 MG/DL (ref 8.7–10.5)
CHLORIDE SERPL-SCNC: 105 MMOL/L (ref 95–110)
CO2 SERPL-SCNC: 27 MMOL/L (ref 23–29)
CREAT SERPL-MCNC: 0.8 MG/DL (ref 0.5–1.4)
DIFFERENTIAL METHOD: ABNORMAL
EOSINOPHIL # BLD AUTO: 0.2 K/UL (ref 0–0.5)
EOSINOPHIL NFR BLD: 1.6 % (ref 0–8)
ERYTHROCYTE [DISTWIDTH] IN BLOOD BY AUTOMATED COUNT: 13.6 % (ref 11.5–14.5)
EST. GFR  (AFRICAN AMERICAN): >60 ML/MIN/1.73 M^2
EST. GFR  (NON AFRICAN AMERICAN): >60 ML/MIN/1.73 M^2
GLUCOSE SERPL-MCNC: 102 MG/DL (ref 70–110)
HCT VFR BLD AUTO: 40.1 % (ref 40–54)
HGB BLD-MCNC: 12.8 G/DL (ref 14–18)
IMM GRANULOCYTES # BLD AUTO: 0.02 K/UL (ref 0–0.04)
IMM GRANULOCYTES NFR BLD AUTO: 0.2 % (ref 0–0.5)
LYMPHOCYTES # BLD AUTO: 3.2 K/UL (ref 1–4.8)
LYMPHOCYTES NFR BLD: 31.8 % (ref 18–48)
MAGNESIUM SERPL-MCNC: 1.8 MG/DL (ref 1.6–2.6)
MCH RBC QN AUTO: 29.6 PG (ref 27–31)
MCHC RBC AUTO-ENTMCNC: 31.9 G/DL (ref 32–36)
MCV RBC AUTO: 93 FL (ref 82–98)
MONOCYTES # BLD AUTO: 0.9 K/UL (ref 0.3–1)
MONOCYTES NFR BLD: 8.8 % (ref 4–15)
NEUTROPHILS # BLD AUTO: 5.7 K/UL (ref 1.8–7.7)
NEUTROPHILS NFR BLD: 56.7 % (ref 38–73)
NRBC BLD-RTO: 0 /100 WBC
PHOSPHATE SERPL-MCNC: 3.5 MG/DL (ref 2.7–4.5)
PLATELET # BLD AUTO: 245 K/UL (ref 150–350)
PMV BLD AUTO: 10 FL (ref 9.2–12.9)
POTASSIUM SERPL-SCNC: 3.9 MMOL/L (ref 3.5–5.1)
PROT SERPL-MCNC: 7.1 G/DL (ref 6–8.4)
RBC # BLD AUTO: 4.33 M/UL (ref 4.6–6.2)
SODIUM SERPL-SCNC: 140 MMOL/L (ref 136–145)
WBC # BLD AUTO: 10.04 K/UL (ref 3.9–12.7)

## 2019-12-09 PROCEDURE — 97535 SELF CARE MNGMENT TRAINING: CPT

## 2019-12-09 PROCEDURE — 92507 TX SP LANG VOICE COMM INDIV: CPT

## 2019-12-09 PROCEDURE — 97530 THERAPEUTIC ACTIVITIES: CPT

## 2019-12-09 PROCEDURE — 97802 MEDICAL NUTRITION INDIV IN: CPT

## 2019-12-09 PROCEDURE — 80053 COMPREHEN METABOLIC PANEL: CPT

## 2019-12-09 PROCEDURE — 36415 COLL VENOUS BLD VENIPUNCTURE: CPT

## 2019-12-09 PROCEDURE — 25000003 PHARM REV CODE 250: Performed by: STUDENT IN AN ORGANIZED HEALTH CARE EDUCATION/TRAINING PROGRAM

## 2019-12-09 PROCEDURE — 63600175 PHARM REV CODE 636 W HCPCS: Performed by: NURSE PRACTITIONER

## 2019-12-09 PROCEDURE — 97116 GAIT TRAINING THERAPY: CPT

## 2019-12-09 PROCEDURE — 84100 ASSAY OF PHOSPHORUS: CPT

## 2019-12-09 PROCEDURE — 85025 COMPLETE CBC W/AUTO DIFF WBC: CPT

## 2019-12-09 PROCEDURE — 11000001 HC ACUTE MED/SURG PRIVATE ROOM

## 2019-12-09 PROCEDURE — 25000003 PHARM REV CODE 250: Performed by: NURSE PRACTITIONER

## 2019-12-09 PROCEDURE — 25000003 PHARM REV CODE 250: Performed by: PSYCHIATRY & NEUROLOGY

## 2019-12-09 PROCEDURE — 99233 PR SUBSEQUENT HOSPITAL CARE,LEVL III: ICD-10-PCS | Mod: ,,, | Performed by: PSYCHIATRY & NEUROLOGY

## 2019-12-09 PROCEDURE — 99233 SBSQ HOSP IP/OBS HIGH 50: CPT | Mod: ,,, | Performed by: PSYCHIATRY & NEUROLOGY

## 2019-12-09 PROCEDURE — 83735 ASSAY OF MAGNESIUM: CPT

## 2019-12-09 RX ORDER — LISINOPRIL 5 MG/1
10 TABLET ORAL DAILY
Status: DISCONTINUED | OUTPATIENT
Start: 2019-12-09 | End: 2019-12-17 | Stop reason: HOSPADM

## 2019-12-09 RX ADMIN — HEPARIN SODIUM 5000 UNITS: 5000 INJECTION INTRAVENOUS; SUBCUTANEOUS at 05:12

## 2019-12-09 RX ADMIN — ATORVASTATIN CALCIUM 40 MG: 20 TABLET, FILM COATED ORAL at 08:12

## 2019-12-09 RX ADMIN — HEPARIN SODIUM 5000 UNITS: 5000 INJECTION INTRAVENOUS; SUBCUTANEOUS at 09:12

## 2019-12-09 RX ADMIN — HEPARIN SODIUM 5000 UNITS: 5000 INJECTION INTRAVENOUS; SUBCUTANEOUS at 01:12

## 2019-12-09 RX ADMIN — CARVEDILOL 6.25 MG: 6.25 TABLET, FILM COATED ORAL at 09:12

## 2019-12-09 RX ADMIN — ACETAMINOPHEN 650 MG: 325 TABLET ORAL at 01:12

## 2019-12-09 RX ADMIN — ASPIRIN 81 MG: 81 TABLET, COATED ORAL at 09:12

## 2019-12-09 RX ADMIN — SENNOSIDES AND DOCUSATE SODIUM 1 TABLET: 8.6; 5 TABLET ORAL at 08:12

## 2019-12-09 RX ADMIN — LISINOPRIL 10 MG: 5 TABLET ORAL at 01:12

## 2019-12-09 NOTE — RESEARCH
SleepSMART Study Screening Visit    Patient was seen to discuss SleepSMART Study.  The risks/benefits of screening/participation were discussed and all questions answered.  Written informed consent obtained via AL[facility] Cedillo. He meets inc/excl criteria for screening.  mRS =2; NIHSS = 1(aphasia).    Janes Gaona MD

## 2019-12-09 NOTE — SUBJECTIVE & OBJECTIVE
Interval History:  NAD overnight. Remains with expressive aphasia, improved from yesterday. No signs of neurological instability. Able to maintain SBP < 160. No signs of deficit associated with heme conversion. Stable for ASA/Heparin start.Prepare for stepdown to Vascular Stroke.    Review of Systems   Constitutional: Positive for activity change.   HENT: Positive for voice change.    Respiratory: Negative.    Cardiovascular: Negative.    Gastrointestinal: Negative.    Endocrine: Negative.    Genitourinary: Negative.    Musculoskeletal: Negative.    Skin: Negative.    Neurological: Positive for speech difficulty and weakness.   Hematological: Negative.    Psychiatric/Behavioral: Negative.           Objective:     Vitals:  Temp: 98.2 °F (36.8 °C)  Pulse: 74  Rhythm: normal sinus rhythm  BP: (!) 163/83  MAP (mmHg): 116  Resp: 18  SpO2: (!) 94 %    Temp  Min: 97.8 °F (36.6 °C)  Max: 98.5 °F (36.9 °C)  Pulse  Min: 65  Max: 80  BP  Min: 132/77  Max: 166/82  MAP (mmHg)  Min: 94  Max: 116  Resp  Min: 14  Max: 22  SpO2  Min: 91 %  Max: 98 %    12/07 0701 - 12/08 0700  In: 1055.6 [P.O.:800; I.V.:255.6]  Out: 1810 [Urine:1810]   Unmeasured Output  Urine Occurrence: 2  Stool Occurrence: 0       Physical Exam   Constitutional: He appears well-developed and well-nourished.   HENT:   Head: Normocephalic and atraumatic.   Eyes: Pupils are equal, round, and reactive to light. EOM are normal.   Neck: Normal range of motion. Neck supple.   Cardiovascular: Normal rate.   Pulmonary/Chest: Effort normal and breath sounds normal. No respiratory distress.   Abdominal: Soft. Bowel sounds are normal. He exhibits no distension.   Musculoskeletal: Normal range of motion.   Neurological:   Mental Status:  Awake, follows commands   Improved aphasia  Pupils 3 mm, PERRL.    +gag, +cough   Equal strength throughout    Unable to test judgment, fund of knowledge, coordination, gait due to level of consciousness.    Medications:  Continuous  Scheduled  aspirin 81 mg Daily   atorvastatin 40 mg QHS   carvedilol 6.25 mg BID   heparin (porcine) 5,000 Units Q8H   senna-docusate 8.6-50 mg 1 tablet BID   PRN  acetaminophen 650 mg Q6H PRN   calcium gluconate IVPB 1 g PRN   calcium gluconate IVPB 1 g PRN   calcium gluconate IVPB 1 g PRN   magnesium sulfate IVPB 2 g PRN   magnesium sulfate IVPB 4 g PRN   potassium chloride in water 40 mEq PRN   And     potassium chloride in water 60 mEq PRN   And     potassium chloride in water 80 mEq PRN   sodium chloride 0.9% 10 mL PRN   sodium chloride 0.9% 10 mL PRN   sodium phosphate IVPB 15 mmol PRN   sodium phosphate IVPB 20.01 mmol PRN   sodium phosphate IVPB 30 mmol PRN     Today I personally reviewed pertinent medications, lines/drains/airways, laboratory results, notably:    Diet  Diet Adult Regular (IDDSI Level 7) [facility]  Diet Adult Regular (IDDSI Level 7) [facility]

## 2019-12-09 NOTE — ASSESSMENT & PLAN NOTE
Area of cytotoxic cerebral edema identified when reviewing brain imaging in the territory of the left middle cerebral artery. There (is/is not) mass effect associated with it. We will continue to monitor the patients clinical exam for any worsening of symptoms which may indicate expansion of the stroke or the area of the edema resulting in the clinical change. The pattern is suggestive of ESUS etiology.

## 2019-12-09 NOTE — PROGRESS NOTES
[facility]  Vascular Neurology  Comprehensive Stroke Center  Progress Note    Assessment/Plan:     * Embolic stroke involving left middle cerebral artery  60 y.o. yo male with unknown past medical history, who presented to [facility] with AMS s/p fall. LKN ~1130 pm pn 12/5. CTA completed at OSH revealed left MCA proximal occlusion. Patient transferred to [facility] for possible intervention. Patient VAN + on arrival to [facility]. CTH with ASPECTS ~7. Patient taken to IR and is now s/p successful thrombectomy with TICI 2b flow. Admitted to [facility] for close monitoring/higher level of care.      Noted Left MCA distribution infarct with hemorrhagic conversion in the insula and temporal lobe on MRI. Etiology ESUS at this time.     Patient with improvement in expressive aphasia, PT/OT recommending rehab.    Antithrombotics for secondary stroke prevention: ASA 81mg QD    Statins for secondary stroke prevention and hyperlipidemia, if present:   Statins: Atorvastatin- 40 mg daily    Aggressive risk factor modification: HLD,       Rehab efforts: The patient has been evaluated by a stroke team provider and the therapy needs have been fully considered based off the presenting complaints and exam findings. The following therapy evaluations are needed: PT evaluate and treat, OT evaluate and treat, SLP evaluate and treat, PM&R evaluate for appropriate placement- rehab    Diagnostics ordered/pending: none    VTE prophylaxis: Heparin 5000 units SQ every 8 hours    BP parameters: Infarct: Post sucessful thrombectomy, SBP <140        Aphasia due to acute cerebrovascular accident (CVA)  Aggressive SLP        Cytotoxic cerebral edema  Area of cytotoxic cerebral edema identified when reviewing brain imaging in the territory of the left middle cerebral artery. There (is/is not) mass effect associated with it. We will continue to monitor the patients clinical exam for any worsening of symptoms which may indicate expansion of  the stroke or the area of the edema resulting in the clinical change. The pattern is suggestive of ESUS etiology.        Mixed hyperlipidemia  -stroke risk factor         Recommend Atorvastatin 40 mg          12/08/2019 improvement of expressive aphasia today   12/09/2019: PORTILLO. BP elevated overnight. Coreg discontinued. Lisinopril started.     STROKE DOCUMENTATION   Acute Stroke Times   Last Known Normal Date: 12/05/19  Last Known Normal Time: 2330  Stroke Team Called Date: 12/06/19  Stroke Team Called Time: 0800  Stroke Team Arrival Date: 12/06/19  Stroke Team Arrival Time: 0804  CT Interpretation Time: 0810  Decision to Treat Time for Alteplase: (N/A)    NIH Scale:  1a. Level of Consciousness: 0-->Alert, keenly responsive  1b. LOC Questions: 0-->Answers both questions correctly  1c. LOC Commands: 1-->Performs one task correctly  2. Best Gaze: 0-->Normal  3. Visual: 0-->No visual loss  4. Facial Palsy: 0-->Normal symmetrical movements  5a. Motor Arm, Left: 0-->No drift, limb holds 90 (or 45) degrees for full 10 secs  5b. Motor Arm, Right: 0-->No drift, limb holds 90 (or 45) degrees for full 10 secs  6a. Motor Leg, Left: 0-->No drift, leg holds 30 degree position for full 5 secs  6b. Motor Leg, Right: 0-->No drift, leg holds 30 degree position for full 5 secs  7. Limb Ataxia: 0-->Absent  8. Sensory: 0-->Normal, no sensory loss  9. Best Language: 1-->Mild-to-moderate aphasia, some obvious loss of fluency or facility of comprehension, without significant limitation on ideas expressed or form of expression. Reduction of speech and/or comprehension, however, makes conversation. . . (see row details)  10. Dysarthria: 0-->Normal  11. Extinction and Inattention (formerly Neglect): 0-->No abnormality  Total (NIH Stroke Scale): 2       Modified [place] Score: (unknown)  [place] Coma Scale:    ABCD2 Score:    [id number] Score:   HAS -BLED Score:   ICH Score:   Hunt & Cook Classification:      Hemorrhagic change of  an Ischemic Stroke: Does this patient have an ischemic stroke with hemorrhagic changes? Yes, Grading Scale: HI Type 1 (HI-1) = small petechiae along the margins of the infarct. Is this a symptomatic change?  No - Hemorrhage is not clinically significant     Neurologic Chief Complaint:  L MCA, expressive aphasia     Subjective:     Interval History: Patient is seen for follow-up neurological assessment and treatment recommendations:     NAEON. BP elevated overnight. Coreg discontinued. Lisinopril started.     HPI, Past Medical, Family, and Social History remains the same as documented in the initial encounter.     Review of Systems   Constitutional: Negative for fever.   Eyes: Negative for visual disturbance.   Neurological: Positive for speech difficulty. Negative for facial asymmetry, weakness, light-headedness and numbness.   Psychiatric/Behavioral: Negative for confusion and decreased concentration.     Scheduled Meds:   aspirin  81 mg Oral Daily    atorvastatin  40 mg Oral QHS    heparin (porcine)  5,000 Units Subcutaneous Q8H    lisinopril  10 mg Oral Daily    senna-docusate 8.6-50 mg  1 tablet Oral BID     Continuous Infusions:  PRN Meds:acetaminophen, sodium chloride 0.9%, sodium chloride 0.9%    Objective:     Vital Signs (Most Recent):  Temp: 97.5 °F (36.4 °C) (12/09/19 0806)  Pulse: 74 (12/09/19 0806)  Resp: 18 (12/09/19 0806)  BP: 138/76 (12/09/19 0806)  SpO2: 98 % (12/09/19 0806)  BP Location: Right arm    Vital Signs Range (Last 24H):  Temp:  [97.5 °F (36.4 °C)-98.5 °F (36.9 °C)]   Pulse:  [61-74]   Resp:  [16-22]   BP: (135-166)/(70-89)   SpO2:  [93 %-98 %]   BP Location: Right arm    Physical Exam   Constitutional: He is oriented to person, place, and time. He appears well-developed and well-nourished.   HENT:   Head: Normocephalic and atraumatic.   Right Ear: External ear normal.   Left Ear: External ear normal.   Nose: Nose normal.   Mouth/Throat: Oropharynx is clear and moist.   Eyes: Pupils  are equal, round, and reactive to light. Conjunctivae and EOM are normal.   Cardiovascular: Normal rate.   Pulmonary/Chest: Effort normal.   Abdominal: Soft.   Musculoskeletal: Normal range of motion.   Neurological: He is alert and oriented to person, place, and time.   Skin: Skin is warm and dry.   Psychiatric: He has a normal mood and affect.   Vitals reviewed.      Neurological Exam:   LOC: alert  Attention Span: Good   Language: Expressive aphasia  Articulation: No dysarthria  Orientation: Person, Place, Time   Visual Fields: Full  EOM (CN III, IV, VI): Full/intact  Pupils (CN II, III): PERRL  Facial Sensation (CN V): Normal  Facial Movement (CN VII): Symmetric facial expression    Motor: Arm left  Normal 5/5  Leg left  Normal 5/5  Arm right  Normal 5/5  Leg right Normal 5/5  Cebellar: No evidence of appendicular or axial ataxia  Sensation: Intact to light touch, temperature and vibration  Tone: Normal tone throughout    Laboratory:  CMP:   Recent Labs   Lab 12/09/19  0350   CALCIUM 9.2   ALBUMIN 3.3*   PROT 7.1      K 3.9   CO2 27      BUN 12   CREATININE 0.8   ALKPHOS 65   ALT 12   AST 14   BILITOT 0.6     BMP:   Recent Labs   Lab 12/09/19  0350      K 3.9      CO2 27   BUN 12   CREATININE 0.8   CALCIUM 9.2     CBC:   Recent Labs   Lab 12/09/19  0350   WBC 10.04   RBC 4.33*   HGB 12.8*   HCT 40.1      MCV 93   MCH 29.6   MCHC 31.9*     Lipid Panel:   Recent Labs   Lab 12/06/19  0817   CHOL 218*   LDLCALC 144.0   HDL 53   TRIG 105     Coagulation:   Recent Labs   Lab 12/07/19  0307   INR 1.0   APTT 25.6     Platelet Aggregation Study: No results for input(s): PLTAGG, PLTAGINTERP, PLTAGREGLACO, ADPPLTAGGREG in the last 168 hours.  Hgb A1C:   Recent Labs   Lab 12/06/19  1635   HGBA1C 5.7*     TSH:   Recent Labs   Lab 12/06/19  0817   TSH 0.976       Diagnostic Results     Brain imaging:        CT head 12/08/2019  1. Evolving left MCA distribution infarct with superimposed petechial  hemorrhage and mild mass effect resulting in minimal rightward midline shift of 1 mm.      MRI Brain 12/07/2019  1. Left MCA distribution infarct with hemorrhagic conversion in the insula and temporal lobe.  Associated localized mass effect without midline shift.  2. Chronic microvascular ischemic change.      CT head 12/06/2019      Ill-defined regions of decreased attenuation left MCA territory specifically left insula, left posterior temporoparietal cortex and lateral aspect of the left lentiform nucleus most compatible with acute areas of infarction.  There is no significant mass effect or midline shift.  No evidence for hemorrhagic conversion.    There is slight hyperdensity associated with the intracranial vasculature likely related to recent contrast administration for outside institution angiography        Cardiac Evaluation:   · Normal left ventricular systolic function. The estimated ejection fraction is 65%  · Eccentric left ventricular hypertrophy.  · Indeterminate left ventricular diastolic function.  · Normal right ventricular systolic function.  · Mild aortic valve stenosis. peak velocity is 2.94 m/s; mean gradient is 20 mmHg. ABBIE cannot be measured accurately.  · Normal central venous pressure (3 mm Hg).  · Mild mitral regurgitation.  · No wall motion abnormalities.           Anderson Engle NP  [facility]  Department of Vascular Neurology   [facility]

## 2019-12-09 NOTE — ASSESSMENT & PLAN NOTE
60 y.o. yo male with unknown past medical history, who presented to [place] with AMS s/p fall. LKN ~1130 pm pn 12/5. CTA completed at OSH revealed left MCA proximal occlusion. Patient transferred to [facility] for possible intervention. Patient VAN + on arrival to [facility]. CTH with ASPECTS ~7. Patient taken to IR and is now s/p successful thrombectomy with TICI 2b flow. Admitted to [facility] for close monitoring/higher level of care.      Noted Left MCA distribution infarct with hemorrhagic conversion in the insula and temporal lobe on MRI. Etiology ESUS at this time.     Patient with improvement in expressive aphasia, PT/OT recommending rehab.    Antithrombotics for secondary stroke prevention: ASA 81mg QD    Statins for secondary stroke prevention and hyperlipidemia, if present:   Statins: Atorvastatin- 40 mg daily    Aggressive risk factor modification: HLD,       Rehab efforts: The patient has been evaluated by a stroke team provider and the therapy needs have been fully considered based off the presenting complaints and exam findings. The following therapy evaluations are needed: PT evaluate and treat, OT evaluate and treat, SLP evaluate and treat, PM&R evaluate for appropriate placement- rehab    Diagnostics ordered/pending: none    VTE prophylaxis: Heparin 5000 units SQ every 8 hours    BP parameters: Infarct: Post sucessful thrombectomy, SBP <140

## 2019-12-09 NOTE — PT/OT/SLP PROGRESS
"Occupational Therapy   Treatment    Name: Riaz Lane  MRN: [id number]  Admitting Diagnosis:  Embolic stroke involving left middle cerebral artery  3 Days Post-Op    Recommendations:     Discharge Recommendations: rehabilitation facility  Discharge Equipment Recommendations:  other (see comments)(TBD)  Barriers to discharge:  Decreased caregiver support, Inaccessible home environment    Assessment:     Riaz Lane is a 59 y.o. male with a medical diagnosis of Embolic stroke involving left middle cerebral artery.  He presents with performance deficits affecting function are weakness, impaired endurance, impaired sensation, impaired self care skills, impaired functional mobilty, gait instability, impaired balance, impaired cognition, decreased upper extremity function, decreased lower extremity function, decreased safety awareness, impaired cardiopulmonary response to activity. Patient presents with impaired sensation of LUE/LE. Patient with poor insight into deficits and impaired command follow (followed 1/5 2 part commands successfully). Continued expressive aphasia, improved from Saturday.  At this time patient is unsafe to discharge home. When medically stable, Pt is an excellent candidate for inpatient rehabilitation, as he has a qualifying diagnosis, displays good activity tolerance, has experienced decline from PLOF, has good social support, and is motivated to participate in therapy.       Rehab Prognosis:  Good; patient would benefit from acute skilled OT services to address these deficits and reach maximum level of function.       Plan:     Patient to be seen 4 x/week to address the above listed problems via self-care/home management, therapeutic activities, therapeutic exercises, neuromuscular re-education, cognitive retraining, sensory integration  · Plan of Care Expires: 01/06/20  · Plan of Care Reviewed with: patient, other (see comments)(patient's friends from [employer])    Subjective     Patient: "they are " "merchants" referring to missionaries (friends present at bedside) Later, patient crying, "i'm scared because I don't want to leave, I'm homeless"     Pain/Comfort:  · Pain Rating 1: 0/10(pt reporting decreased sensation of R side)  · Pain Rating Post-Intervention 1: 0/10     Patient's friends from [facility] present at start of session, able to assist in occupational profile,  And Mrs. Dye.   Patient also with decreased expressive aphasia, able to verify OP provided.     Occupational Profile:  Living Environment: Patient is homeless. Currently lives "in his truck" per pt he lives "in a barn".  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   Equipment Used at Home:  none  Assistance upon Discharge: Supportive [employer] community, [place] [phone] and friend Teo (owner of restaurant pt paints at) [phone]. Patient gave OT name of friend Tae, who "called the ambulance" [phone]    Objective:     Communicated with: RN prior to session.  Patient found HOB elevated with bed alarm, blood pressure cuff, peripheral IV, telemetry upon OT entry to room.    General Precautions: Standard, aphasia, aspiration, fall   Orthopedic Precautions:N/A   Braces: N/A     Occupational Performance:     Bed Mobility:    · Patient completed Rolling/Turning to Left with  stand by assistance  · Patient completed Rolling/Turning to Right with stand by assistance  · Patient completed Scooting/Bridging with stand by assistance  · Patient completed Supine to Sit with contact guard assistance  · Patient completed Sit to Supine with contact guard assistance     Functional Mobility/Transfers:  · Patient completed Sit <> Stand Transfer with contact guard assistance  with  no assistive device   · Patient completed Bed <> Chair Transfer using Step Transfer technique with contact guard assistance with no assistive " "device  · Functional Mobility: Patient ambulated bathroom distance to complete grooming in standing. Patient required CGA and no AD. Pt reported "funny feeling" 2* impaired sensation on Rside.     Activities of Daily Living:  · Feeding:  dental soft/thin Patient SBA drinking water in standing during grooming task. Appropriate use of RUE  · Grooming: CGA perfoming grooming in standing at sink  · Upper Body Dressing: contact guard assistance seated UIC  · Lower Body Dressing: contact guard assistance donning socks seated UIC      AMPAC 6 Click ADL: 18    Treatment & Education:  -Pt education on OT role and POC   -Importance of OOB activity with staff assistance  -Safety during functional transfer and mobility  -White board updated  -Multiple self-care tasks and functional mobility completed -- assistance level noted above  -All questions and concerns answered within OT scope of practice.   -Extensive time spent coordinating with Case Management to ensure patient's Occupational Profile and homeless status conveyed       Patient left up in chair with all lines intact, call button in reach, RN notified and tech presentEducation:      GOALS:   Multidisciplinary Problems     Occupational Therapy Goals        Problem: Occupational Therapy Goal    Goal Priority Disciplines Outcome Interventions   Occupational Therapy Goal     OT, PT/OT Ongoing, Progressing    Description:  Goals set on 12/7, with expiration date 12/21:  Patient will increase functional independence with ADLs by performing:    Feeding with Stand-by Assistance.  Grooming while standing at sink with Contact Guard Assistance.  UB Dressing with Contact Guard Assistance.  LB Dressing with Contact Guard Assistance.  Toileting from toilet with Minimal Assistance for hygiene and clothing management.   Rolling to Bilateral with Stand-by Assistance.   Supine <> Sit with Stand-by Assistance.  Step transfer with Contact Guard Assistance  Toilet transfer to toilet with " Minimal Assistance.                      Time Tracking:     OT Date of Treatment: 12/09/19  OT Start Time: 1117  OT Stop Time: 1214  OT Total Time (min): 57 min    Billable Minutes:Self Care/Home Management 30  Therapeutic Activity 27    Kimberly Bermudez OT  12/9/2019

## 2019-12-09 NOTE — ASSESSMENT & PLAN NOTE
60 year old gentleman with unknown medical history admitted for L MCA occlusion s/p thrombectomy with TICI2b flow.   - Neurochecks q1h  - Vitals q1h  - I/O  - SBP <140  - MRI with heme conversion  Continue PT/OT/SLP  12/8 Start ASA/Heparin

## 2019-12-09 NOTE — SUBJECTIVE & OBJECTIVE
Neurologic Chief Complaint:  L MCA, expressive aphasia     Subjective:     Interval History: Patient is seen for follow-up neurological assessment and treatment recommendations:     NAEON. BP elevated overnight. Coreg discontinued. Lisinopril started.     HPI, Past Medical, Family, and Social History remains the same as documented in the initial encounter.     Review of Systems   Constitutional: Negative for fever.   Eyes: Negative for visual disturbance.   Neurological: Positive for speech difficulty. Negative for facial asymmetry, weakness, light-headedness and numbness.   Psychiatric/Behavioral: Negative for confusion and decreased concentration.     Scheduled Meds:   aspirin  81 mg Oral Daily    atorvastatin  40 mg Oral QHS    heparin (porcine)  5,000 Units Subcutaneous Q8H    lisinopril  10 mg Oral Daily    senna-docusate 8.6-50 mg  1 tablet Oral BID     Continuous Infusions:  PRN Meds:acetaminophen, sodium chloride 0.9%, sodium chloride 0.9%    Objective:     Vital Signs (Most Recent):  Temp: 97.5 °F (36.4 °C) (12/09/19 0806)  Pulse: 74 (12/09/19 0806)  Resp: 18 (12/09/19 0806)  BP: 138/76 (12/09/19 0806)  SpO2: 98 % (12/09/19 0806)  BP Location: Right arm    Vital Signs Range (Last 24H):  Temp:  [97.5 °F (36.4 °C)-98.5 °F (36.9 °C)]   Pulse:  [61-74]   Resp:  [16-22]   BP: (135-166)/(70-89)   SpO2:  [93 %-98 %]   BP Location: Right arm    Physical Exam   Constitutional: He is oriented to person, place, and time. He appears well-developed and well-nourished.   HENT:   Head: Normocephalic and atraumatic.   Right Ear: External ear normal.   Left Ear: External ear normal.   Nose: Nose normal.   Mouth/Throat: Oropharynx is clear and moist.   Eyes: Pupils are equal, round, and reactive to light. Conjunctivae and EOM are normal.   Cardiovascular: Normal rate.   Pulmonary/Chest: Effort normal.   Abdominal: Soft.   Musculoskeletal: Normal range of motion.   Neurological: He is alert and oriented to person, place,  and time.   Skin: Skin is warm and dry.   Psychiatric: He has a normal mood and affect.   Vitals reviewed.      Neurological Exam:   LOC: alert  Attention Span: Good   Language: Expressive aphasia  Articulation: No dysarthria  Orientation: Person, Place, Time   Visual Fields: Full  EOM (CN III, IV, VI): Full/intact  Pupils (CN II, III): PERRL  Facial Sensation (CN V): Normal  Facial Movement (CN VII): Symmetric facial expression    Motor: Arm left  Normal 5/5  Leg left  Normal 5/5  Arm right  Normal 5/5  Leg right Normal 5/5  Cebellar: No evidence of appendicular or axial ataxia  Sensation: Intact to light touch, temperature and vibration  Tone: Normal tone throughout    Laboratory:  CMP:   Recent Labs   Lab 12/09/19  0350   CALCIUM 9.2   ALBUMIN 3.3*   PROT 7.1      K 3.9   CO2 27      BUN 12   CREATININE 0.8   ALKPHOS 65   ALT 12   AST 14   BILITOT 0.6     BMP:   Recent Labs   Lab 12/09/19  0350      K 3.9      CO2 27   BUN 12   CREATININE 0.8   CALCIUM 9.2     CBC:   Recent Labs   Lab 12/09/19  0350   WBC 10.04   RBC 4.33*   HGB 12.8*   HCT 40.1      MCV 93   MCH 29.6   MCHC 31.9*     Lipid Panel:   Recent Labs   Lab 12/06/19  0817   CHOL 218*   LDLCALC 144.0   HDL 53   TRIG 105     Coagulation:   Recent Labs   Lab 12/07/19  0307   INR 1.0   APTT 25.6     Platelet Aggregation Study: No results for input(s): PLTAGG, PLTAGINTERP, PLTAGREGLACO, ADPPLTAGGREG in the last 168 hours.  Hgb A1C:   Recent Labs   Lab 12/06/19  1635   HGBA1C 5.7*     TSH:   Recent Labs   Lab 12/06/19  0817   TSH 0.976       Diagnostic Results     Brain imaging:      MRI Brain 12/07/2019  1. Left MCA distribution infarct with hemorrhagic conversion in the insula and temporal lobe.  Associated localized mass effect without midline shift.  2. Chronic microvascular ischemic change.      CT head 12/06/2019      Ill-defined regions of decreased attenuation left MCA territory specifically left insula, left posterior  temporoparietal cortex and lateral aspect of the left lentiform nucleus most compatible with acute areas of infarction.  There is no significant mass effect or midline shift.  No evidence for hemorrhagic conversion.    There is slight hyperdensity associated with the intracranial vasculature likely related to recent contrast administration for outside institution angiography        Cardiac Evaluation:   · Normal left ventricular systolic function. The estimated ejection fraction is 65%  · Eccentric left ventricular hypertrophy.  · Indeterminate left ventricular diastolic function.  · Normal right ventricular systolic function.  · Mild aortic valve stenosis. peak velocity is 2.94 m/s; mean gradient is 20 mmHg. ABBIE cannot be measured accurately.  · Normal central venous pressure (3 mm Hg).  · Mild mitral regurgitation.  · No wall motion abnormalities.

## 2019-12-09 NOTE — PLAN OF CARE
Problem: SLP Goal  Goal: SLP Goal  Description  Speech Language Pathology Goals  Goals expected to be met by 12/14/19  1. Pt will tolerate dental soft diet with thin liquids with adequate oral clearance and no overt S/S aspiration, MOD I  2. Pt will complete automatic speech tasks with 90% accuracy, MIN A  3. Pt will name common objects with 75% accuracy or higher, MIN A  4. Pt will answer YNQ with 90% accuracy, MIN A  5. Pt will follow simple commands with 90% accuracy, MIN A  6. Pt will participate in further assessment of cognition, reading and writing   7. Educate Pt and family on safety awareness and compensatory strategies for fx communication      Outcome: Ongoing, Progressing    Continue ST plan of care.    MONICO Collins, CCC-SLP  Speech Language Pathologist  [phone]

## 2019-12-09 NOTE — PROGRESS NOTES
[facility]  Neurocritical Care  Progress Note    Admit Date: 12/6/2019  Service Date: 12/08/2019  Length of Stay: 2    Subjective:     Chief Complaint: Embolic stroke involving left middle cerebral artery    History of Present Illness: Patient is a 60 year old male with unknown medical history who was admitted for L MCA presented to [place] ED after he fell from a chair at a bar. He was noted to have aphasia and right sided weakness. Workup was significant for L MCA occlusion (per notes) and he was transported to [place] for possible intervention. Last known normal was 23:30 on 12/5, no tPA was given.. He is s/p thombectomy, TICI 2b. He is being admitted to [facility] for post procedure management.     Hospital Course: 12/6: s/p thombectomy TICI 2b.   12/07/2019 heme conversion on repeat scan  12/08/2019 NAD overnight. Stable for transfer    Interval History:  NAD overnight. Remains with expressive aphasia, improved from yesterday. No signs of neurological instability. Able to maintain SBP < 160. No signs of deficit associated with heme conversion. Stable for ASA/Heparin start.Prepare for stepdown to Vascular Stroke.    Review of Systems   Constitutional: Positive for activity change.   HENT: Positive for voice change.    Respiratory: Negative.    Cardiovascular: Negative.    Gastrointestinal: Negative.    Endocrine: Negative.    Genitourinary: Negative.    Musculoskeletal: Negative.    Skin: Negative.    Neurological: Positive for speech difficulty and weakness.   Hematological: Negative.    Psychiatric/Behavioral: Negative.           Objective:     Vitals:  Temp: 98.2 °F (36.8 °C)  Pulse: 74  Rhythm: normal sinus rhythm  BP: (!) 163/83  MAP (mmHg): 116  Resp: 18  SpO2: (!) 94 %    Temp  Min: 97.8 °F (36.6 °C)  Max: 98.5 °F (36.9 °C)  Pulse  Min: 65  Max: 80  BP  Min: 132/77  Max: 166/82  MAP (mmHg)  Min: 94  Max: 116  Resp  Min: 14  Max: 22  SpO2  Min: 91 %  Max: 98 %    12/07 0701 - 12/08 0700  In:  1055.6 [P.O.:800; I.V.:255.6]  Out: 1810 [Urine:1810]   Unmeasured Output  Urine Occurrence: 2  Stool Occurrence: 0       Physical Exam   Constitutional: He appears well-developed and well-nourished.   HENT:   Head: Normocephalic and atraumatic.   Eyes: Pupils are equal, round, and reactive to light. EOM are normal.   Neck: Normal range of motion. Neck supple.   Cardiovascular: Normal rate.   Pulmonary/Chest: Effort normal and breath sounds normal. No respiratory distress.   Abdominal: Soft. Bowel sounds are normal. He exhibits no distension.   Musculoskeletal: Normal range of motion.   Neurological:   Mental Status:  Awake, follows commands   Improved aphasia  Pupils 3 mm, PERRL.    +gag, +cough   Equal strength throughout    Unable to test judgment, fund of knowledge, coordination, gait due to level of consciousness.    Medications:  Continuous Scheduled  aspirin 81 mg Daily   atorvastatin 40 mg QHS   carvedilol 6.25 mg BID   heparin (porcine) 5,000 Units Q8H   senna-docusate 8.6-50 mg 1 tablet BID   PRN  acetaminophen 650 mg Q6H PRN   calcium gluconate IVPB 1 g PRN   calcium gluconate IVPB 1 g PRN   calcium gluconate IVPB 1 g PRN   magnesium sulfate IVPB 2 g PRN   magnesium sulfate IVPB 4 g PRN   potassium chloride in water 40 mEq PRN   And     potassium chloride in water 60 mEq PRN   And     potassium chloride in water 80 mEq PRN   sodium chloride 0.9% 10 mL PRN   sodium chloride 0.9% 10 mL PRN   sodium phosphate IVPB 15 mmol PRN   sodium phosphate IVPB 20.01 mmol PRN   sodium phosphate IVPB 30 mmol PRN     Today I personally reviewed pertinent medications, lines/drains/airways, laboratory results, notably:    Diet  Diet Adult Regular (IDDSI Level 7) [facility]  Diet Adult Regular (IDDSI Level 7) [facility]        Assessment/Plan:     Neuro  * Embolic stroke involving left middle cerebral artery  60 year old gentleman with unknown medical history admitted for L MCA occlusion s/p thrombectomy with  TICI2b flow.   - Neurochecks q1h  - Vitals q1h  - I/O  - SBP <140  - MRI with heme conversion  Continue PT/OT/SLP  12/8 Start ASA/Heparin    Cytotoxic cerebral edema  Cytotoxic cerebral edema identified on imaging studies consistent with ischemic stroke in within left MCA territory and mild hemorrhagic transformation noted on GRE    Maintain Eunatremia  Neuro Checks Q1 hr    Cardiac/Vascular  Mixed hyperlipidemia  Monitor Lipid Panel   Continue Statin      Other  Decreased strength, endurance, and mobility  2/2 L MCA  PT/OT    Aphasia due to acute cerebrovascular accident (CVA)  2/2 L MCA stroke  SLP evaluate and treat          The patient is being Prophylaxed for:  Venous Thromboembolism with: Mechanical  Stress Ulcer with: None  Ventilator Pneumonia with: none    Activity Orders          Diet Adult Regular (IDDSI Level 7) [facility]: Regular starting at 12/07 1037        Full Code    Valentine English NP  Neurocritical Care  [facility]

## 2019-12-09 NOTE — PLAN OF CARE
Problem: Adult Inpatient Plan of Care  Goal: Plan of Care Review  Outcome: Ongoing, Progressing     Problem: Fall Injury Risk  Goal: Absence of Fall and Fall-Related Injury  Outcome: Ongoing, Progressing     Problem: Adjustment to Illness (Stroke, Ischemic/Transient Ischemic Attack)  Goal: Optimal Coping  Outcome: Ongoing, Progressing   POC reviewed with patient, required frequent re-direction. Patient aaox1; re-oriented to place, time, and situation. Left forearm IV infiltrated and d'daria; site hard and tender to touch. Warm compress applied to site. Rt groin covered with gauze and tegaderm; bruising to right head noted 2/2 fall. Patient medicated with PRN meds for a headache. Fall precautions maintained; bed alarm armed. Will continue to monitor.

## 2019-12-09 NOTE — PROGRESS NOTES
"[facility]  Adult Nutrition  Progress Note    SUMMARY       Recommendations    1. Continue regular diet as tolerated.  Goals: 1. Pt's intake >75% x 7 days.  Nutrition Goal Status: progressing towards goal  Communication of RD Recs: (POC)    Reason for Assessment    Reason For Assessment: RD follow-up  Diagnosis: stroke/CVA  Relevant Medical History: None  Interdisciplinary Rounds: did not attend  General Information Comments: Pt advanced to regular diet with excellent intake. Pt reports good po intake and stable wt PTA, appears nourished, NFPE not warranted.  Provided low sodium diet education with handout.  Nutrition Discharge Planning: Pt to follow heart healthy diet.    Nutrition Risk Screen    Nutrition Risk Screen: no indicators present    Nutrition/Diet History    Spiritual, Cultural Beliefs, Advent Practices, Values that Affect Care: no    Anthropometrics    Temp: 98 °F (36.7 °C)  Height Method: Estimated  Height: 5' 8" (172.7 cm)  Height (inches): 68 in  Weight Method: Bed Scale  Weight: 91.1 kg (200 lb 13.4 oz)  Weight (lb): 200.84 lb  Ideal Body Weight (IBW), Male: 154 lb       Lab/Procedures/Meds    Pertinent Labs Reviewed: reviewed  Pertinent Labs Comments: A1c 5.7%  Pertinent Medications Reviewed: reviewed  Pertinent Medications Comments: statin      Estimated/Assessed Needs    Weight Used For Calorie Calculations: 91.1 kg (200 lb 13.4 oz)  Energy Calorie Requirements (kcal): 2041 kcal  Energy Need Method: [place]-St Jeor(PAL 1.20)  Protein Requirements: 82-100g/day  Weight Used For Protein Calculations: 91.1 kg (200 lb 13.4 oz)        RDA Method (mL): 2041         Nutrition Prescription Ordered    Current Diet Order: Regular    Evaluation of Received Nutrient/Fluid Intake    I/O: NA  Comments: LBM 12/09  % Intake of Estimated Energy Needs: 75 - 100 %  % Meal Intake: 75 - 100 %    Nutrition Risk    Level of Risk/Frequency of Follow-up: low     Assessment and Plan    Nutrition " Problem  Food and Nutrition-related Knowledge Deficit    Related to (etiology):   Low sodium diet    Signs and Symptoms (as evidenced by):   Pt unaware of foods higher in sodium     Interventions(treatment strategy):  Collaboration of care with providers.  Nutrition Education: low sodium diet    Nutrition Diagnosis Status:   New      Monitor and Evaluation    Food and Nutrient Intake: energy intake, food and beverage intake  Food and Nutrient Adminstration: diet order  Anthropometric Measurements: weight, weight change, body mass index  Biochemical Data, Medical Tests and Procedures: electrolyte and renal panel, gastrointestinal profile, glucose/endocrine profile, inflammatory profile, lipid profile  Nutrition-Focused Physical Findings: overall appearance     Malnutrition Assessment     Pt does not meet criteria for malnutrition.    Nutrition Follow-Up    RD Follow-up?: Yes

## 2019-12-09 NOTE — PT/OT/SLP PROGRESS
"Physical Therapy Treatment    Patient Name:  Riaz Lane   MRN:  [id number]    Recommendations:     Discharge Recommendations:  outpatient PT   Discharge Equipment Recommendations: none   Barriers to discharge: Decreased caregiver support and expressive aphasia    Assessment:     Riaz Lane is a 59 y.o. male admitted with a medical diagnosis of Embolic stroke involving left middle cerebral artery.  He presents with the following impairments/functional limitations:  impaired cognition, impaired sensation, impaired endurance, decreased upper extremity function, decreased safety awareness, impaired balance. The patient demonstrates improvement in aphasia and balance. He continues to report impairment in R UE sensation. He ambulated 200' with stand by assistance and no AD, no loss of balance with dual task. He continues to have difficulty following multi-step commands. The patient would benefit from continued therapy to address the above deficits.     Rehab Prognosis: Good; patient would benefit from acute skilled PT services to address these deficits and reach maximum level of function.    Recent Surgery: Procedure(s) (LRB):  ANGIOGRAM-CEREBRAL (N/A) 3 Days Post-Op    Plan:     During this hospitalization, patient to be seen 3 x/week to address the identified rehab impairments via gait training, therapeutic activities, therapeutic exercises, neuromuscular re-education and progress toward the following goals:    · Plan of Care Expires:  01/06/20    Subjective     Chief Complaint: "My head hurts, but I just got some medicine for it"  Patient/Family Comments/goals: patient motivated to ambulate with therapy  Pain/Comfort:  · Pain Rating 1: 9/10  · Location - Side 1: Left  · Location - Orientation 1: generalized  · Location 1: head  · Pain Addressed 1: Pre-medicate for activity, Distraction  · Pain Rating Post-Intervention 1: 9/10      Objective:     Communicated with RN prior to session.  Patient found HOB elevated with bed " alarm, peripheral IV upon PT entry to room.     General Precautions: Standard, aphasia, fall, aspiration   Orthopedic Precautions:N/A   Braces: N/A     Functional Mobility:    Bed Mobility  Rolling to R: modified independent   Supine to Sit:  modified independent    Transfers Sit to Stand:  supervision assistance    Gait  Gait Distance: 200 ft with no Ad  Assistance Level: stand by assistance   Description: decreased gait speed, reciprocal stride. Performed dynamic head turns, sudden turns and change in direction with no evidence of instability.      RICHARDSON BALANCE SCALE     1.SITTING TO STANDING:  (4) Pt able to stand without using hands and stabilize independently     2. STANDING UNSUPPORTED: (4) Pt able to stand safely 2 minutes     3. SITTING WITH BACK UNSUPPORTED BUT FEET SUPPORTED ON FLOOR: (4) Pt able to sit safely and securely 2 minutes     4. STANDING TO SITTING:(4) Pt sits safely with minimal use of hands     5. TRANSFERS:(4) Pt  able to transfer safely with minor use of hands    6. STANDING UNSUPPORTED WITH EYES CLOSED:(4) Pt  able to stand 10 seconds safely    7. STANDING UNSUPPORTED WITH FEET TOGETHER:(4) Pt   able to place feet together independently and stand 1 minute safely    8. REACHING FORWARD WITH OUTSTRETCHED ARM WHILE STANDING:(3) Pt can reach forward >12.5 cm safely (5 inches)    9.  OBJECT FROM THE FLOOR FROM A STANDING POSITION:(3) Pt able to  slipper but needs supervision    10. TURNING TO LOOK BEHIND OVER LEFT AND RIGHT SHOULDERS WHILE STANDING:(4) Pt looks behind from both sides and weight shifts well    11. TURN 360 DEGREES:(4) Pt able to turn 360 degrees safely in 4 seconds or less    12. PLACING ALTERNATE FOOT ON STEP OR STOOL WHILE STANDING UNSUPPORTED:(3) Pt able to stand independently and complete 8 steps >20 seconds    13. STANDING UNSUPPORTED ONE FOOT IN FRONT: (4) Pt able to place foot tandem independently and hold 30 seconds     14. STANDING ON ONE LEG:(3) Pt able to  "lift leg independently and hold 5-10 seconds    Total Score 52/56    41-56 = low fall risk  21-40 = medium fall risk  0 -20 = high fall risk        AM-PAC 6 CLICK MOBILITY  Turning over in bed (including adjusting bedclothes, sheets and blankets)?: 4  Sitting down on and standing up from a chair with arms (e.g., wheelchair, bedside commode, etc.): 4  Moving from lying on back to sitting on the side of the bed?: 4  Moving to and from a bed to a chair (including a wheelchair)?: 4  Need to walk in hospital room?: 4  Climbing 3-5 steps with a railing?: 4  Basic Mobility Total Score: 24       Therapeutic Activities and Exercises:  Patient oriented to self, month/year; not oriented to place or situation. When asked where he was, stated "I think the [employer] is that way". 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 The patient does not have assist at home and does not have access to increased family/friend support.   Patient educated on role of therapy, goals of session, benefits of out of bed mobility. Patient agreeable to mobilize with therapy.  Discussed PT plan of care during hospitalization. Patient educated that they need to call for assistance to mobilize out of bed. Whiteboard updated as appropriate. Patient educated on how their diagnosis impacts their mobility within PT scope of practice.     Patient left HOB elevated with all lines intact, call button in reach, bed alarm on and discussed patient's progress with OT, alerted OT to change in recommendation from rehab to OP..    GOALS:   Multidisciplinary Problems     Physical Therapy Goals        Problem: Physical Therapy Goal    Goal Priority Disciplines Outcome Goal Variances Interventions   Physical Therapy Goal     PT, PT/OT Ongoing, Progressing     Description:  Goals to be met by: 12/17     Patient will increase functional independence with mobility by " performin. Supine to sit with Modified [place]  2. Sit to supine with Modified [place]  3. Sit to stand transfer with Modified [place]  4. Ambulate 3 minutes with supervision assistance while performing dynamic head turns, sudden change in speed and direction, withstand dynamic perturbations with no loss of balance.     5. Lower extremity exercise program x20 reps per handout, with independence to improve strength and activity tolerance.   6. Increase Gonsalves Balance Score to 56/56 to improve balance and decrease risk of falls.                        Time Tracking:     PT Received On: 19  PT Start Time: 1405     PT Stop Time: 1424  PT Total Time (min): 19 min     Billable Minutes: Gait Training 19    Treatment Type: Treatment  PT/PTA: PT     PTA Visit Number: 0     Tiffanie Wise PT  2019

## 2019-12-09 NOTE — PLAN OF CARE
Patient is homeless. Lives alone in a barn on someone he knows property. He does not know the address. Patient is uninsured.   He has contacts from [facility].  Isidro Dye from [facility] [phone]  Tae, a friend, [phone]  Teo, a friend  [phone]    PCP No primary care provider on file.  Pharmacy No Pharmacies Listed  Payor Payor: /     CM emailed Medicaid office to screen patient.   Recs are rehab, patient chooses [facility] once has insurance.        12/09/19 1726   Discharge Assessment   Assessment Type Discharge Planning Assessment   Assessment information obtained from? Patient   Prior to hospitilization cognitive status: Alert/Oriented;No Deficits   Prior to hospitalization functional status: Independent   Current cognitive status: Alert/Oriented;No Deficits   Current Functional Status: Assistive Equipment   [facility]ility [facility] From: [facility] With alone   Able to Return to Prior Arrangements   (TBD)   Is patient able to care for self after discharge? Unable to determine at this time (comments)   Who are your caregiver(s) and their phone number(s)? Isidro Dye [phone]   Patient's perception of discharge disposition rehab facility   Patient currently being followed by outpatient case management? No   Patient currently receives any other outside agency services? No   Equipment Currently Used at Home none   Do you have any problems affording any of your prescribed medications? TBD   Does the patient have transportation home? No   Dialysis Name and Scheduled days N/A   Does the patient receive services at the [facility]? No   Discharge Plan A Rehab   Discharge Plan B Home   DME Needed Upon Discharge    (TBD)

## 2019-12-09 NOTE — PT/OT/SLP PROGRESS
"Speech Language Pathology Treatment    Patient Name:  Riaz Lane   MRN:  [id number]  Admitting Diagnosis: Embolic stroke involving left middle cerebral artery    Recommendations:                 General Recommendations:  Dysphagia therapy, Speech/language therapy and Cognitive-linguistic therapy  Diet recommendations:  Dental Soft, Liquid Diet Level: Thin   Aspiration Precautions: 1 bite/sip at a time, Alternating bites/sips, Avoid talking while eating, Check for pocketing/oral residue, Eliminate distractions, Feed only when awake/alert, HOB to 90 degrees, Meds whole 1 at a time, Remain upright 30 minutes post meal, Small bites/sips and Strict aspiration precautions    General Precautions: Standard, aspiration, aphasia, fall  Communication strategies:  provide increased time to answer and go to room if call light pushed    Subjective     "It was good" (Pt stated of his lunch)    Pain/Comfort:  · Pain Rating 1: 0/10  · Pain Rating Post-Intervention 1: 0/10    Objective:     Has the patient been evaluated by SLP for swallowing?   Yes  Keep patient NPO? No   Current Respiratory Status: room air      Pt had completed noon meal upon entry.  Pt denied difficulty with meal.  Pt did not wish for further solid boluses.  He was observed swallowing thin liquid with no overt s/s of aspiration.  Ongoing education provided.  Of note, pt pointed out a knot/lump under his beard on the right side of his neck. When asked about it, pt stated it has been present for about a year; however, never addressed by a physician.  It does not appear to impact swallow safety; however, ST informed nursing post session.    Progress noted in language skills evident.  Pt counted from 1-10 ind'ly.  He provided single word responses with 100% accuracy given mildly increased time and allowed self-correction x1. Pt named common objects with 80% accuracy.  He was able to state self and month but not place, year or situation.  Pt read a sentence with mild " errors and self-correction.  Receptively, pt responded to simple y/n questions with 100% accuracy.  Increased difficulty was noted with more complex y/n questions.  Pt followed 1 and 2 step verbal commands ind'ly.  He was unable to follow 3 step command presented.  Ongoing education provided to pt regarding ST role, language deficits, stimulation, purpose of tx tasks, aspiration precautions and poc.  Pt noted to have word search and color pages present at bedside.  Encouraged continued independent stimulation .     Assessment:     Riaz Lane is a 59 y.o. male with an SLP diagnosis of Aphasia, mild oral dysphagia and Cognitive-Linguistic Impairment.  He presents with progress towards language goals.     Goals:   Multidisciplinary Problems     SLP Goals        Problem: SLP Goal    Goal Priority Disciplines Outcome   SLP Goal     SLP Ongoing, Progressing   Description:  Speech Language Pathology Goals  Goals expected to be met by 12/14/19  1. Pt will tolerate dental soft diet with thin liquids with adequate oral clearance and no overt S/S aspiration, MOD I  2. Pt will complete automatic speech tasks with 90% accuracy, MIN A  3. Pt will name common objects with 75% accuracy or higher, MIN A  4. Pt will answer YNQ with 90% accuracy, MIN A  5. Pt will follow simple commands with 90% accuracy, MIN A  6. Pt will participate in further assessment of cognition, reading and writing   7. Educate Pt and family on safety awareness and compensatory strategies for fx communication                       Plan:     · Patient to be seen:  4 x/week   · Plan of Care expires:  01/06/20  · Plan of Care reviewed with:  patient   · SLP Follow-Up:  Yes       Discharge recommendations:  outpatient speech therapy   Time Tracking:     SLP Treatment Date:   12/09/19  Speech Start Time:  1231  Speech Stop Time:  1246     Speech Total Time (min):  15 min    Billable Minutes: Speech Therapy Individual 15    MONICO Collins, CCC-SLP  Speech  Language Pathologist  [phone]  12/9/2019

## 2019-12-09 NOTE — PLAN OF CARE
Patient is awake and alert, oriented x 4. No problems noted today. Speech and dexterity have improved. Patient can move independently and transfer will minimal assist. Will monitor.

## 2019-12-09 NOTE — PLAN OF CARE
Problem: Occupational Therapy Goal  Goal: Occupational Therapy Goal  Description  Goals set on 12/7, with expiration date 12/21:  Patient will increase functional independence with ADLs by performing:    Feeding with Stand-by Assistance.  Grooming while standing at sink with Contact Guard Assistance.  UB Dressing with Contact Guard Assistance.  LB Dressing with Contact Guard Assistance.  Toileting from toilet with Minimal Assistance for hygiene and clothing management.   Rolling to Bilateral with Stand-by Assistance.   Supine <> Sit with Stand-by Assistance.  Step transfer with Contact Guard Assistance  Toilet transfer to toilet with Minimal Assistance.     Outcome: Ongoing, Progressing   Pt progressing towards goals. Cont OT POC    LASHAY Mendes  12/09/2019

## 2019-12-10 LAB
ALBUMIN SERPL BCP-MCNC: 3 G/DL (ref 3.5–5.2)
ALP SERPL-CCNC: 62 U/L (ref 55–135)
ALT SERPL W/O P-5'-P-CCNC: 11 U/L (ref 10–44)
ANION GAP SERPL CALC-SCNC: 8 MMOL/L (ref 8–16)
AST SERPL-CCNC: 13 U/L (ref 10–40)
BASOPHILS # BLD AUTO: 0.1 K/UL (ref 0–0.2)
BASOPHILS NFR BLD: 1.1 % (ref 0–1.9)
BILIRUB SERPL-MCNC: 0.5 MG/DL (ref 0.1–1)
BUN SERPL-MCNC: 15 MG/DL (ref 6–20)
CALCIUM SERPL-MCNC: 9.2 MG/DL (ref 8.7–10.5)
CHLORIDE SERPL-SCNC: 104 MMOL/L (ref 95–110)
CO2 SERPL-SCNC: 26 MMOL/L (ref 23–29)
CREAT SERPL-MCNC: 0.8 MG/DL (ref 0.5–1.4)
DIFFERENTIAL METHOD: ABNORMAL
EOSINOPHIL # BLD AUTO: 0.1 K/UL (ref 0–0.5)
EOSINOPHIL NFR BLD: 1.6 % (ref 0–8)
ERYTHROCYTE [DISTWIDTH] IN BLOOD BY AUTOMATED COUNT: 13.6 % (ref 11.5–14.5)
EST. GFR  (AFRICAN AMERICAN): >60 ML/MIN/1.73 M^2
EST. GFR  (NON AFRICAN AMERICAN): >60 ML/MIN/1.73 M^2
GLUCOSE SERPL-MCNC: 94 MG/DL (ref 70–110)
HCT VFR BLD AUTO: 38.3 % (ref 40–54)
HGB BLD-MCNC: 12.3 G/DL (ref 14–18)
IMM GRANULOCYTES # BLD AUTO: 0.03 K/UL (ref 0–0.04)
IMM GRANULOCYTES NFR BLD AUTO: 0.3 % (ref 0–0.5)
LYMPHOCYTES # BLD AUTO: 3.1 K/UL (ref 1–4.8)
LYMPHOCYTES NFR BLD: 34.7 % (ref 18–48)
MAGNESIUM SERPL-MCNC: 1.8 MG/DL (ref 1.6–2.6)
MCH RBC QN AUTO: 29.8 PG (ref 27–31)
MCHC RBC AUTO-ENTMCNC: 32.1 G/DL (ref 32–36)
MCV RBC AUTO: 93 FL (ref 82–98)
MONOCYTES # BLD AUTO: 0.8 K/UL (ref 0.3–1)
MONOCYTES NFR BLD: 8.9 % (ref 4–15)
NEUTROPHILS # BLD AUTO: 4.8 K/UL (ref 1.8–7.7)
NEUTROPHILS NFR BLD: 53.4 % (ref 38–73)
NRBC BLD-RTO: 0 /100 WBC
PHOSPHATE SERPL-MCNC: 3.5 MG/DL (ref 2.7–4.5)
PLATELET # BLD AUTO: 254 K/UL (ref 150–350)
PMV BLD AUTO: 10.4 FL (ref 9.2–12.9)
POTASSIUM SERPL-SCNC: 4 MMOL/L (ref 3.5–5.1)
PROT SERPL-MCNC: 6.7 G/DL (ref 6–8.4)
RBC # BLD AUTO: 4.13 M/UL (ref 4.6–6.2)
SODIUM SERPL-SCNC: 138 MMOL/L (ref 136–145)
WBC # BLD AUTO: 9.03 K/UL (ref 3.9–12.7)

## 2019-12-10 PROCEDURE — 85025 COMPLETE CBC W/AUTO DIFF WBC: CPT

## 2019-12-10 PROCEDURE — 25000003 PHARM REV CODE 250: Performed by: NURSE PRACTITIONER

## 2019-12-10 PROCEDURE — 97535 SELF CARE MNGMENT TRAINING: CPT

## 2019-12-10 PROCEDURE — 80053 COMPREHEN METABOLIC PANEL: CPT

## 2019-12-10 PROCEDURE — 63600175 PHARM REV CODE 636 W HCPCS: Performed by: NURSE PRACTITIONER

## 2019-12-10 PROCEDURE — 25500020 PHARM REV CODE 255: Performed by: PSYCHIATRY & NEUROLOGY

## 2019-12-10 PROCEDURE — 25000003 PHARM REV CODE 250: Performed by: STUDENT IN AN ORGANIZED HEALTH CARE EDUCATION/TRAINING PROGRAM

## 2019-12-10 PROCEDURE — 36415 COLL VENOUS BLD VENIPUNCTURE: CPT

## 2019-12-10 PROCEDURE — 97530 THERAPEUTIC ACTIVITIES: CPT

## 2019-12-10 PROCEDURE — 83735 ASSAY OF MAGNESIUM: CPT

## 2019-12-10 PROCEDURE — 84100 ASSAY OF PHOSPHORUS: CPT

## 2019-12-10 PROCEDURE — 92507 TX SP LANG VOICE COMM INDIV: CPT

## 2019-12-10 PROCEDURE — 11000001 HC ACUTE MED/SURG PRIVATE ROOM

## 2019-12-10 PROCEDURE — 99233 PR SUBSEQUENT HOSPITAL CARE,LEVL III: ICD-10-PCS | Mod: ,,, | Performed by: PSYCHIATRY & NEUROLOGY

## 2019-12-10 PROCEDURE — 25000003 PHARM REV CODE 250: Performed by: PSYCHIATRY & NEUROLOGY

## 2019-12-10 PROCEDURE — 99233 SBSQ HOSP IP/OBS HIGH 50: CPT | Mod: ,,, | Performed by: PSYCHIATRY & NEUROLOGY

## 2019-12-10 RX ADMIN — ACETAMINOPHEN 650 MG: 325 TABLET ORAL at 07:12

## 2019-12-10 RX ADMIN — HEPARIN SODIUM 5000 UNITS: 5000 INJECTION INTRAVENOUS; SUBCUTANEOUS at 09:12

## 2019-12-10 RX ADMIN — SENNOSIDES AND DOCUSATE SODIUM 1 TABLET: 8.6; 5 TABLET ORAL at 09:12

## 2019-12-10 RX ADMIN — HEPARIN SODIUM 5000 UNITS: 5000 INJECTION INTRAVENOUS; SUBCUTANEOUS at 02:12

## 2019-12-10 RX ADMIN — IOHEXOL 100 ML: 350 INJECTION, SOLUTION INTRAVENOUS at 04:12

## 2019-12-10 RX ADMIN — ATORVASTATIN CALCIUM 40 MG: 20 TABLET, FILM COATED ORAL at 09:12

## 2019-12-10 RX ADMIN — ASPIRIN 81 MG: 81 TABLET, COATED ORAL at 09:12

## 2019-12-10 RX ADMIN — HEPARIN SODIUM 5000 UNITS: 5000 INJECTION INTRAVENOUS; SUBCUTANEOUS at 05:12

## 2019-12-10 RX ADMIN — LISINOPRIL 10 MG: 5 TABLET ORAL at 09:12

## 2019-12-10 NOTE — PLAN OF CARE
Problem: SLP Goal  Goal: SLP Goal  Description  Speech Language Pathology Goals  Goals expected to be met by 12/14/19  1. Pt will tolerate dental soft diet with thin liquids with adequate oral clearance and no overt S/S aspiration, MOD I  2. Pt will complete automatic speech tasks with 90% accuracy, MIN A  3. Pt will name common objects with 75% accuracy or higher, MIN A  4. Pt will answer YNQ with 90% accuracy, MIN A  5. Pt will follow simple commands with 90% accuracy, MIN A  6. Pt will participate in further assessment of cognition, reading and writing   7. Educate Pt and family on safety awareness and compensatory strategies for fx communication      Outcome: Ongoing, Progressing     Pt with good participation in session.     MONICO Bourne, CCC-SLP  12/10/2019

## 2019-12-10 NOTE — PLAN OF CARE
Problem: Adult Inpatient Plan of Care  Goal: Plan of Care Review  Outcome: Ongoing, Progressing     Problem: Adjustment to Illness (Stroke, Ischemic/Transient Ischemic Attack)  Goal: Optimal Coping  Outcome: Ongoing, Progressing     Problem: Cerebral Tissue Perfusion Risk (Stroke, Ischemic/Transient Ischemic Attack)  Goal: Optimal Cerebral Tissue Perfusion  Outcome: Ongoing, Progressing     Lying in bed.  Resp even and unlabored.  No distress noted.  Telemetry running, NSR.  Dsg to Rt groin cdi.  Abrasion to rt side of head, sylvie no ss infection.  Call button within reach, instructed to call if assistance needed.

## 2019-12-10 NOTE — ASSESSMENT & PLAN NOTE
60 y.o. yo male with unknown past medical history, who presented to [facility] with AMS s/p fall. LKN ~1130 pm pn 12/5. CTA completed at OSH revealed left MCA proximal occlusion. Patient transferred [place] [facility] [place] possible intervention. Patient VAN + on arrival to [facility]. CTH with ASPECTS ~7. Patient taken to IR and is now s/p successful thrombectomy with TICI 2b flow. Admitted to [facility] for close monitoring/higher level of care.      Noted Left MCA distribution infarct with hemorrhagic conversion in the insula and temporal lobe on MRI. Etiology ESUS at this time.     Patient progressing well. PT/OT now recommending home with outpatient therapy. Patient is homeless. CM/SW working to plan safe discharge.       Antithrombotics for secondary stroke prevention: ASA 81mg QD    Statins for secondary stroke prevention and hyperlipidemia, if present:   Statins: Atorvastatin- 40 mg daily    Aggressive risk factor modification: HLD,       Rehab efforts: The patient has been evaluated by a stroke team provider and the therapy needs have been fully considered based off the presenting complaints and exam findings. The following therapy evaluations are needed: PT evaluate and treat, OT evaluate and treat, SLP evaluate and treat, PM&R evaluate for appropriate placement- home with outpatient therapy    Diagnostics ordered/pending: none    VTE prophylaxis: Heparin 5000 units SQ every 8 hours    BP parameters: Infarct: Post sucessful thrombectomy, SBP <140

## 2019-12-10 NOTE — PT/OT/SLP PROGRESS
"Occupational Therapy   Treatment    Name: Riaz Lane  MRN: [id number]  Admitting Diagnosis:  Embolic stroke involving left middle cerebral artery  4 Days Post-Op    Recommendations:     Discharge Recommendations: home, outpatient OT  Discharge Equipment Recommendations:  none  Barriers to discharge:  Other (Comment)(patient is homeless)    Assessment:     Riaz Lane is a 59 y.o. male with a medical diagnosis of Embolic stroke involving left middle cerebral artery.  He presents with performance deficits affecting function are impaired sensation, impaired self care skills, impaired cognition, decreased safety awareness, pain. MOCA performed today with results listed below. Patient scored 9/30 indicating severe cognitive deficits. Patient demonstrated no motor or strength deficits during today's session, and reports improved sensation of RUE, though continued numbess to light touch present. Patient would benefit from continued skilled OT services once medically stable for discharge via outpatient OT. Because of patient's homeless status, patient would benefit from discharge to shelter that offers support as patient's cognitive status continues to be a barrier.     Rehab Prognosis:  Good; patient would benefit from acute skilled OT services to address these deficits and reach maximum level of function.       Plan:     Patient to be seen 4 x/week to address the above listed problems via self-care/home management, therapeutic activities, therapeutic exercises, neuromuscular re-education, cognitive retraining, sensory integration  · Plan of Care Expires: 01/06/20  · Plan of Care Reviewed with: patient    Subjective     Patient: "good to see you doc." "whats your name again?" (patient unable to recall STM)   "that's a caterpillar" referring to "camel"     Pain/Comfort:  · Pain Rating 1: 4/10  · Location - Side 1: Left  · Location - Orientation 1: generalized  · Location 1: head  · Pain Addressed 1: Nurse notified  · Pain " "Rating Post-Intervention 1: 5/10    Objective:     Communicated with: RN prior to session.  Patient found in standing in bathroom with peripheral IV, telemetry upon OT entry to room.    General Precautions: Standard, aspiration, aphasia, fall   Orthopedic Precautions:N/A, RLE anterior precautions   Braces:       Occupational Performance:     Bed Mobility:    · Pt UIC      Functional Mobility/Transfers:  · Patient completed Sit <> Stand Transfer with stand by assistance  with  no assistive device   · Patient completed Bed <> Chair Transfer using Step Transfer technique with stand by assistance with no assistive device  · Functional Mobility: Patient in standing in bathroom with gown half on (exposed shoulder and buttocks) upon OT entry. Patient without non-skid socks, reported that he wore them during his shower the day before and "they got wet." Patient ambulated with SBA and no AD bathroom and room distances. Completed grooming in standing and performed dressing in seated in chair. Patient's cognitive status assessed seated [facility]. Mild expressive aphasia continues but improved from yesterday's session.     Activities of Daily Living:  · Grooming: stand by assistance in standing at sink  · Upper Body Dressing: stand by assistance donning gown, adjusting ties  · Lower Body Dressing: stand by assistance donning socks seated [facility] 6 Click ADL: 24    Treatment & Education:  -Pt education on OT role and POC   -Importance of OOB activity with staff assistance  -Safety during functional transfer and mobility  -White board updated  -Multiple self-care tasks and functional mobility completed -- assistance level noted above  -All questions and concerns answered within OT scope of practice.     MOCA cognitive screen: 10/30 indicating severe cognitive impairment.     Deficits include: visuospatial/executive function, object naming, STM memory and delayed recall, attention, language, abstract thinking and orientation. "       Patient left up in chair with all lines intact, call button in reach and RN notifiedEducation:      GOALS:   Multidisciplinary Problems     Occupational Therapy Goals        Problem: Occupational Therapy Goal    Goal Priority Disciplines Outcome Interventions   Occupational Therapy Goal     OT, PT/OT Ongoing, Progressing    Description:  Goals set on 12/10, with expiration date 12/21:  Patient will increase functional independence with ADLs by performing:    Feeding with Stand-by Assistance. Met  Grooming while standing at sink with Contact Guard Assistance.Met  Updated: Grooming while standing at sink with SBAssistance  UB Dressing with Contact Guard Assistance.Met  Updated: UB Dressing with SBAssistance  LB Dressing with Contact Guard Assistance.Met  Updated: LB Dressing with SBAssistance  Toileting from toilet with Minimal Assistance for hygiene and clothing management. Met  Updated: Toileting from toilet with SBAssistance for hygiene and clothing management  Rolling to Bilateral with Stand-by Assistance. Met  Supine <> Sit with Stand-by Assistance.Met  Step transfer with Contact Guard Assistance Met  Updated: Step transfer with SBAssistance  Toilet transfer to toilet with Minimal Assistance. Met  Updated: Toilet transfer to toilet with SBAssistance                     Time Tracking:     OT Date of Treatment: 12/10/19  OT Start Time: 0821  OT Stop Time: 0859  OT Total Time (min): 38 min    Billable Minutes:Self Care/Home Management 30  Therapeutic Activity 8    Kimberly Bermudez OT  12/10/2019

## 2019-12-10 NOTE — PROGRESS NOTES
[facility]  Vascular Neurology  Comprehensive Stroke Center  Progress Note    Assessment/Plan:     * Embolic stroke involving left middle cerebral artery  60 y.o. yo male with unknown past medical history, who presented to [facility] [place] with AMS s/p fall. LKN ~1130 pm pn 12/5. CTA completed at OSH revealed left MCA proximal occlusion. Patient transferred to [facility] for possible intervention. Patient VAN + on arrival to [facility]. CTH with ASPECTS ~7. Patient taken to IR and is now s/p successful thrombectomy with TICI 2b flow. Admitted to [facility] for close monitoring/higher level of care.      Noted Left MCA distribution infarct with hemorrhagic conversion in the insula and temporal lobe on MRI. Etiology ESUS at this time.     Patient progressing well. PT/OT now recommending home with outpatient therapy. Patient is homeless. CM/SW working to plan safe discharge.       Antithrombotics for secondary stroke prevention: ASA 81mg QD    Statins for secondary stroke prevention and hyperlipidemia, if present:   Statins: Atorvastatin- 40 mg daily    Aggressive risk factor modification: HLD,       Rehab efforts: The patient has been evaluated by a stroke team provider and the therapy needs have been fully considered based off the presenting complaints and exam findings. The following therapy evaluations are needed: PT evaluate and treat, OT evaluate and treat, SLP evaluate and treat, PM&R evaluate for appropriate placement- home with outpatient therapy    Diagnostics ordered/pending: none    VTE prophylaxis: Heparin 5000 units SQ every 8 hours    BP parameters: Infarct: Post sucessful thrombectomy, SBP <140        Aphasia due to acute cerebrovascular accident (CVA)  Aggressive SLP        Cytotoxic cerebral edema  Area of cytotoxic cerebral edema identified when reviewing brain imaging in the territory of the left middle cerebral artery. There (is/is not) mass effect associated with it. We will continue to monitor  the patients clinical exam for any worsening of symptoms which may indicate expansion of the stroke or the area of the edema resulting in the clinical change. The pattern is suggestive of ESUS etiology.        Mixed hyperlipidemia  -stroke risk factor         Recommend Atorvastatin 40 mg        12/08/2019 improvement of expressive aphasia today   12/09/2019: NAEON. BP elevated overnight. Coreg discontinued. Lisinopril started.   12/10/2019: Patient progressing well. PT/OT now recommending home with outpatient therapy. Patient is homeless. CM/SW working to plan safe discharge.     STROKE DOCUMENTATION   Acute Stroke Times   Last Known Normal Date: 12/05/19  Last Known Normal Time: 2330  Stroke Team Called Date: 12/06/19  Stroke Team Called Time: 0800  Stroke Team Arrival Date: 12/06/19  Stroke Team Arrival Time: 0804  CT Interpretation Time: 0810  Decision to Treat Time for Alteplase: (N/A)    NIH Scale:  1a. Level of Consciousness: 0-->Alert, keenly responsive  1b. LOC Questions: 0-->Answers both questions correctly  1c. LOC Commands: 0-->Performs both tasks correctly  2. Best Gaze: 0-->Normal  3. Visual: 0-->No visual loss  4. Facial Palsy: 0-->Normal symmetrical movements  5a. Motor Arm, Left: 0-->No drift, limb holds 90 (or 45) degrees for full 10 secs  5b. Motor Arm, Right: 0-->No drift, limb holds 90 (or 45) degrees for full 10 secs  6a. Motor Leg, Left: 0-->No drift, leg holds 30 degree position for full 5 secs  6b. Motor Leg, Right: 0-->No drift, leg holds 30 degree position for full 5 secs  7. Limb Ataxia: 0-->Absent  8. Sensory: 0-->Normal, no sensory loss  9. Best Language: 1-->Mild-to-moderate aphasia, some obvious loss of fluency or facility of comprehension, without significant limitation on ideas expressed or form of expression. Reduction of speech and/or comprehension, however, makes conversation. . . (see row details)  10. Dysarthria: 0-->Normal  11. Extinction and Inattention (formerly  Neglect): 0-->No abnormality  Total (NIH Stroke Scale): 1       Modified Hillister Score: (unknown)  Allison Coma Scale:    ABCD2 Score:    [id number] Score:   HAS -BLED Score:   ICH Score:   Hunt & Cook Classification:      Hemorrhagic change of an Ischemic Stroke: Does this patient have an ischemic stroke with hemorrhagic changes? Yes, Grading Scale: HI Type 1 (HI-1) = small petechiae along the margins of the infarct. Is this a symptomatic change?  No - Hemorrhage is not clinically significant     Neurologic Chief Complaint:  L MCA, expressive aphasia     Subjective:     Interval History: Patient is seen for follow-up neurological assessment and treatment recommendations:     Patient progressing well. PT/OT now recommending home with outpatient therapy. Patient is homeless. CM/SW working to plan safe discharge.     HPI, Past Medical, Family, and Social History remains the same as documented in the initial encounter.     Review of Systems   Constitutional: Negative for fever.   Eyes: Negative for visual disturbance.   Neurological: Positive for speech difficulty. Negative for facial asymmetry, weakness, light-headedness and numbness.   Psychiatric/Behavioral: Negative for confusion and decreased concentration.     Scheduled Meds:   aspirin  81 mg Oral Daily    atorvastatin  40 mg Oral QHS    heparin (porcine)  5,000 Units Subcutaneous Q8H    lisinopril  10 mg Oral Daily    senna-docusate 8.6-50 mg  1 tablet Oral BID     Continuous Infusions:  PRN Meds:acetaminophen, sodium chloride 0.9%, sodium chloride 0.9%    Objective:     Vital Signs (Most Recent):  Temp: 96.1 °F (35.6 °C) (12/10/19 1122)  Pulse: 66 (12/10/19 1122)  Resp: 18 (12/10/19 1122)  BP: (!) 141/86 (12/10/19 1122)  SpO2: 95 % (12/10/19 1122)  BP Location: Left arm    Vital Signs Range (Last 24H):  Temp:  [96.1 °F (35.6 °C)-98.1 °F (36.7 °C)]   Pulse:  [60-75]   Resp:  [15-18]   BP: (115-157)/(63-86)   SpO2:  [92 %-96 %]   BP Location: Left  arm    Physical Exam   Constitutional: He is oriented to person, place, and time. He appears well-developed and well-nourished.   HENT:   Head: Normocephalic and atraumatic.   Right Ear: External ear normal.   Left Ear: External ear normal.   Nose: Nose normal.   Mouth/Throat: Oropharynx is clear and moist.   Eyes: Pupils are equal, round, and reactive to light. Conjunctivae and EOM are normal.   Cardiovascular: Normal rate.   Pulmonary/Chest: Effort normal.   Abdominal: Soft.   Musculoskeletal: Normal range of motion.   Neurological: He is alert and oriented to person, place, and time.   Skin: Skin is warm and dry.   Psychiatric: He has a normal mood and affect.   Vitals reviewed.      Neurological Exam:   LOC: alert  Attention Span: Good   Language: Expressive aphasia  Articulation: No dysarthria  Orientation: Person, Place, Time   Visual Fields: Full  EOM (CN III, IV, VI): Full/intact  Pupils (CN II, III): PERRL  Facial Sensation (CN V): Normal  Facial Movement (CN VII): Symmetric facial expression    Motor: Arm left  Normal 5/5  Leg left  Normal 5/5  Arm right  Normal 5/5  Leg right Normal 5/5  Cebellar: No evidence of appendicular or axial ataxia  Sensation: Intact to light touch, temperature and vibration  Tone: Normal tone throughout    Laboratory:  CMP:   Recent Labs   Lab 12/10/19  0331   CALCIUM 9.2   ALBUMIN 3.0*   PROT 6.7      K 4.0   CO2 26      BUN 15   CREATININE 0.8   ALKPHOS 62   ALT 11   AST 13   BILITOT 0.5     BMP:   Recent Labs   Lab 12/10/19  0331      K 4.0      CO2 26   BUN 15   CREATININE 0.8   CALCIUM 9.2     CBC:   Recent Labs   Lab 12/10/19  0331   WBC 9.03   RBC 4.13*   HGB 12.3*   HCT 38.3*      MCV 93   MCH 29.8   MCHC 32.1     Lipid Panel:   Recent Labs   Lab 12/06/19  0817   CHOL 218*   LDLCALC 144.0   HDL 53   TRIG 105     Coagulation:   Recent Labs   Lab 12/07/19  0307   INR 1.0   APTT 25.6     Platelet Aggregation Study: No results for input(s):  PLTAGG, PLTAGINTERP, PLTAGREGLACO, ADPPLTAGGREG in the last 168 hours.  Hgb A1C:   Recent Labs   Lab 12/06/19  1635   HGBA1C 5.7*     TSH:   Recent Labs   Lab 12/06/19  0817   TSH 0.976       Diagnostic Results     Brain imaging:      MRI Brain 12/07/2019  1. Left MCA distribution infarct with hemorrhagic conversion in the insula and temporal lobe.  Associated localized mass effect without midline shift.  2. Chronic microvascular ischemic change.      CT head 12/06/2019      Ill-defined regions of decreased attenuation left MCA territory specifically left insula, left posterior temporoparietal cortex and lateral aspect of the left lentiform nucleus most compatible with acute areas of infarction.  There is no significant mass effect or midline shift.  No evidence for hemorrhagic conversion.    There is slight hyperdensity associated with the intracranial vasculature likely related to recent contrast administration for outside institution angiography        Cardiac Evaluation:   · Normal left ventricular systolic function. The estimated ejection fraction is 65%  · Eccentric left ventricular hypertrophy.  · Indeterminate left ventricular diastolic function.  · Normal right ventricular systolic function.  · Mild aortic valve stenosis. peak velocity is 2.94 m/s; mean gradient is 20 mmHg. ABBIE cannot be measured accurately.  · Normal central venous pressure (3 mm Hg).  · Mild mitral regurgitation.  · No wall motion abnormalities.           Anderson Engle, AMURO  [facility]  Department of Vascular Neurology   [facility]

## 2019-12-10 NOTE — PLAN OF CARE
CM met with patient regarding change in recommendations. Patient states he sleeps on a hammock in a barn. He states it has electricity and he keeps the light on so that the rats do not come near him. 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 He states his phone was turned off and cannot afford to get it turned back on. States his vehicle does not work. Patient is still pending Medicaid at this time. Unable to afford medications. SW to consult and look into resources. Possibly nursing home placement. Team notified. Will need an address to have Medicaid card mailed to.      12/10/19 6859   Discharge Reassessment   Assessment Type Discharge Planning Reassessment   Discharge Plan A Home   Discharge Plan B New Nursing Home placement - [facility] care facility   Anticipated Discharge Disposition Home

## 2019-12-10 NOTE — PT/OT/SLP PROGRESS
"Speech Language Pathology Treatment    Patient Name:  Riaz Lane   MRN:  [id number]  Admitting Diagnosis: Embolic stroke involving left middle cerebral artery    Recommendations:                 General Recommendations:  Dysphagia therapy, Speech/language therapy and Cognitive-linguistic therapy  Diet recommendations:  Dental Soft, Liquid Diet Level: Thin   Aspiration Precautions: 1 bite/sip at a time, Check for pocketing/oral residue, HOB to 90 degrees, Monitor for s/s of aspiration and Standard aspiration precautions   General Precautions: Standard, aphasia  Communication strategies:  provide increased time to answer    Subjective     "I'm sorry. One more time" when asking for clarification  Patient goals: get better    Pain/Comfort:  · Pain Rating 1: 0/10  · Pain Rating Post-Intervention 1: 0/10    Objective:     Has the patient been evaluated by SLP for swallowing?   Yes  Keep patient NPO? No   Current Respiratory Status: room air      Pt seen sitting up in chair. Pt reported he was doing better but recalled that he did not do well on questions from therapist earlier in the day. Reviewed word finding strategies and encouraged pt to use them. Pt did use gestures independently but needed cues to use description. Pt was oriented to month, year, place given cues. Pt named members described with 50% acc and given cues 100% acc. He named 3 members in a category with 100% acc and min a. He had difficulty sequencing simple steps with word finding deficits noted and nonspecific language used. White board updated. Pt with good participation in session.     Assessment:     Riaz Lane is a 59 y.o. male with an SLP diagnosis of Aphasia, Dysphagia and Cognitive-Linguistic Impairment.  He presents with progress towards goals.    Goals:   Multidisciplinary Problems     SLP Goals        Problem: SLP Goal    Goal Priority Disciplines Outcome   SLP Goal     SLP Ongoing, Progressing   Description:  Speech Language Pathology " Goals  Goals expected to be met by 12/14/19  1. Pt will tolerate dental soft diet with thin liquids with adequate oral clearance and no overt S/S aspiration, MOD I  2. Pt will complete automatic speech tasks with 90% accuracy, MIN A  3. Pt will name common objects with 75% accuracy or higher, MIN A  4. Pt will answer YNQ with 90% accuracy, MIN A  5. Pt will follow simple commands with 90% accuracy, MIN A  6. Pt will participate in further assessment of cognition, reading and writing   7. Educate Pt and family on safety awareness and compensatory strategies for fx communication                       Plan:     · Patient to be seen:  4 x/week   · Plan of Care expires:  01/06/20  · Plan of Care reviewed with:  patient   · SLP Follow-Up:  Yes       Discharge recommendations:  outpatient speech therapy     Time Tracking:     SLP Treatment Date:   12/10/19  Speech Start Time:  1054  Speech Stop Time:  1110     Speech Total Time (min):  16 min    Billable Minutes: Speech Therapy Individual 8 and Seld Care/Home Management Training 8    MONICO Bourne, CCC-SLP  12/10/2019

## 2019-12-10 NOTE — PLAN OF CARE
Problem: Occupational Therapy Goal  Goal: Occupational Therapy Goal  Description  Goals set on 12/10, with expiration date 12/21:  Patient will increase functional independence with ADLs by performing:    Feeding with Stand-by Assistance. Met  Grooming while standing at sink with Contact Guard Assistance.Met  Updated: Grooming while standing at sink with SBAssistance  UB Dressing with Contact Guard Assistance.Met  Updated: UB Dressing with SBAssistance  LB Dressing with Contact Guard Assistance.Met  Updated: LB Dressing with SBAssistance  Toileting from toilet with Minimal Assistance for hygiene and clothing management. Met  Updated: Toileting from toilet with SBAssistance for hygiene and clothing management  Rolling to Bilateral with Stand-by Assistance. Met  Supine <> Sit with Stand-by Assistance.Met  Step transfer with Contact Guard Assistance Met  Updated: Step transfer with SBAssistance  Toilet transfer to toilet with Minimal Assistance. Met  Updated: Toilet transfer to toilet with SBAssistance    Outcome: Ongoing, Progressing  Goals updated. Pt progressing towards goals. Cont OT POC     LASHAY Mendes  12/10/2019

## 2019-12-10 NOTE — PROGRESS NOTES
Subject was seen inpatient today and consented for the following study:     Study title: [facility]  IRB #: 2018.408  Sponsor: [facility]     Informed Consent Process  Present for discussion: Subject, PI, and    Is LAR Consenting for Subject: No     Prior to the Informed Consent (IC) being signed, or any protocol required testing, procedure, or intervention being performed, the following was done or discussed:  · Purpose of the Study, Qualifications to Participate: Yes   · Study Design, Schedule and Procedures: Yes   · Risks, Benefits, Alternative Treatments, Compensation and Costs: Yes   · Confidentiality and HIPAA Authorization for Release of Medical Records for the research trial/subject's right/study related injury: Yes   · Study related contact information: Yes   · Voluntary Participation and Withdrawal from the research trial at any time: Yes   · Optional samples/procedures (if applicable): Yes   · Patient has been offered the opportunity to ask questions regarding the study and all questions were answered satisfactorily: Yes   · Patient verbalizes understanding of the study/procedures and agrees to participate: Yes   · CRC and PI contact information given to patient: Yes   · Signed copy given to patient: Yes   · Copy in patient's chart and original uploaded to Epic: Yes      Person Obtaining Consent: Tammie Ac     All baseline procedures were performed according to protocol   Eligibility confirmed by PI

## 2019-12-10 NOTE — SUBJECTIVE & OBJECTIVE
Neurologic Chief Complaint:  L MCA, expressive aphasia     Subjective:     Interval History: Patient is seen for follow-up neurological assessment and treatment recommendations:     Patient progressing well. PT/OT now recommending home with outpatient therapy. Patient is homeless. CM/SW working to plan safe discharge.     HPI, Past Medical, Family, and Social History remains the same as documented in the initial encounter.     Review of Systems   Constitutional: Negative for fever.   Eyes: Negative for visual disturbance.   Neurological: Positive for speech difficulty. Negative for facial asymmetry, weakness, light-headedness and numbness.   Psychiatric/Behavioral: Negative for confusion and decreased concentration.     Scheduled Meds:   aspirin  81 mg Oral Daily    atorvastatin  40 mg Oral QHS    heparin (porcine)  5,000 Units Subcutaneous Q8H    lisinopril  10 mg Oral Daily    senna-docusate 8.6-50 mg  1 tablet Oral BID     Continuous Infusions:  PRN Meds:acetaminophen, sodium chloride 0.9%, sodium chloride 0.9%    Objective:     Vital Signs (Most Recent):  Temp: 96.1 °F (35.6 °C) (12/10/19 1122)  Pulse: 66 (12/10/19 1122)  Resp: 18 (12/10/19 1122)  BP: (!) 141/86 (12/10/19 1122)  SpO2: 95 % (12/10/19 1122)  BP Location: Left arm    Vital Signs Range (Last 24H):  Temp:  [96.1 °F (35.6 °C)-98.1 °F (36.7 °C)]   Pulse:  [60-75]   Resp:  [15-18]   BP: (115-157)/(63-86)   SpO2:  [92 %-96 %]   BP Location: Left arm    Physical Exam   Constitutional: He is oriented to person, place, and time. He appears well-developed and well-nourished.   HENT:   Head: Normocephalic and atraumatic.   Right Ear: External ear normal.   Left Ear: External ear normal.   Nose: Nose normal.   Mouth/Throat: Oropharynx is clear and moist.   Eyes: Pupils are equal, round, and reactive to light. Conjunctivae and EOM are normal.   Cardiovascular: Normal rate.   Pulmonary/Chest: Effort normal.   Abdominal: Soft.   Musculoskeletal: Normal range  of motion.   Neurological: He is alert and oriented to person, place, and time.   Skin: Skin is warm and dry.   Psychiatric: He has a normal mood and affect.   Vitals reviewed.      Neurological Exam:   LOC: alert  Attention Span: Good   Language: Expressive aphasia  Articulation: No dysarthria  Orientation: Person, Place, Time   Visual Fields: Full  EOM (CN III, IV, VI): Full/intact  Pupils (CN II, III): PERRL  Facial Sensation (CN V): Normal  Facial Movement (CN VII): Symmetric facial expression    Motor: Arm left  Normal 5/5  Leg left  Normal 5/5  Arm right  Normal 5/5  Leg right Normal 5/5  Cebellar: No evidence of appendicular or axial ataxia  Sensation: Intact to light touch, temperature and vibration  Tone: Normal tone throughout    Laboratory:  CMP:   Recent Labs   Lab 12/10/19  0331   CALCIUM 9.2   ALBUMIN 3.0*   PROT 6.7      K 4.0   CO2 26      BUN 15   CREATININE 0.8   ALKPHOS 62   ALT 11   AST 13   BILITOT 0.5     BMP:   Recent Labs   Lab 12/10/19  0331      K 4.0      CO2 26   BUN 15   CREATININE 0.8   CALCIUM 9.2     CBC:   Recent Labs   Lab 12/10/19  0331   WBC 9.03   RBC 4.13*   HGB 12.3*   HCT 38.3*      MCV 93   MCH 29.8   MCHC 32.1     Lipid Panel:   Recent Labs   Lab 12/06/19  0817   CHOL 218*   LDLCALC 144.0   HDL 53   TRIG 105     Coagulation:   Recent Labs   Lab 12/07/19  0307   INR 1.0   APTT 25.6     Platelet Aggregation Study: No results for input(s): PLTAGG, PLTAGINTERP, PLTAGREGLACO, ADPPLTAGGREG in the last 168 hours.  Hgb A1C:   Recent Labs   Lab 12/06/19  1635   HGBA1C 5.7*     TSH:   Recent Labs   Lab 12/06/19  0817   TSH 0.976       Diagnostic Results     Brain imaging:      MRI Brain 12/07/2019  1. Left MCA distribution infarct with hemorrhagic conversion in the insula and temporal lobe.  Associated localized mass effect without midline shift.  2. Chronic microvascular ischemic change.      CT head 12/06/2019      Ill-defined regions of decreased  attenuation left MCA territory specifically left insula, left posterior temporoparietal cortex and lateral aspect of the left lentiform nucleus most compatible with acute areas of infarction.  There is no significant mass effect or midline shift.  No evidence for hemorrhagic conversion.    There is slight hyperdensity associated with the intracranial vasculature likely related to recent contrast administration for outside institution angiography        Cardiac Evaluation:   · Normal left ventricular systolic function. The estimated ejection fraction is 65%  · Eccentric left ventricular hypertrophy.  · Indeterminate left ventricular diastolic function.  · Normal right ventricular systolic function.  · Mild aortic valve stenosis. peak velocity is 2.94 m/s; mean gradient is 20 mmHg. ABBIE cannot be measured accurately.  · Normal central venous pressure (3 mm Hg).  · Mild mitral regurgitation.  · No wall motion abnormalities.

## 2019-12-11 ENCOUNTER — RESEARCH ENCOUNTER (OUTPATIENT)
Dept: NEUROLOGY | Facility: CLINIC | Age: 59
End: 2019-12-11

## 2019-12-11 PROBLEM — I10 ESSENTIAL HYPERTENSION: Status: ACTIVE | Noted: 2019-12-11

## 2019-12-11 PROBLEM — J39.2 OROPHARYNGEAL MASS: Status: ACTIVE | Noted: 2019-12-11

## 2019-12-11 LAB
ALBUMIN SERPL BCP-MCNC: 3 G/DL (ref 3.5–5.2)
ALP SERPL-CCNC: 65 U/L (ref 55–135)
ALT SERPL W/O P-5'-P-CCNC: 11 U/L (ref 10–44)
ANION GAP SERPL CALC-SCNC: 12 MMOL/L (ref 8–16)
AST SERPL-CCNC: 14 U/L (ref 10–40)
BASOPHILS # BLD AUTO: 0.08 K/UL (ref 0–0.2)
BASOPHILS NFR BLD: 1.1 % (ref 0–1.9)
BILIRUB SERPL-MCNC: 0.5 MG/DL (ref 0.1–1)
BUN SERPL-MCNC: 14 MG/DL (ref 6–20)
CALCIUM SERPL-MCNC: 9.2 MG/DL (ref 8.7–10.5)
CHLORIDE SERPL-SCNC: 106 MMOL/L (ref 95–110)
CO2 SERPL-SCNC: 23 MMOL/L (ref 23–29)
CREAT SERPL-MCNC: 0.7 MG/DL (ref 0.5–1.4)
DIFFERENTIAL METHOD: ABNORMAL
EOSINOPHIL # BLD AUTO: 0.2 K/UL (ref 0–0.5)
EOSINOPHIL NFR BLD: 2.3 % (ref 0–8)
ERYTHROCYTE [DISTWIDTH] IN BLOOD BY AUTOMATED COUNT: 13.2 % (ref 11.5–14.5)
EST. GFR  (AFRICAN AMERICAN): >60 ML/MIN/1.73 M^2
EST. GFR  (NON AFRICAN AMERICAN): >60 ML/MIN/1.73 M^2
GLUCOSE SERPL-MCNC: 94 MG/DL (ref 70–110)
HCT VFR BLD AUTO: 39.8 % (ref 40–54)
HGB BLD-MCNC: 12.8 G/DL (ref 14–18)
IMM GRANULOCYTES # BLD AUTO: 0.01 K/UL (ref 0–0.04)
IMM GRANULOCYTES NFR BLD AUTO: 0.1 % (ref 0–0.5)
LYMPHOCYTES # BLD AUTO: 2.5 K/UL (ref 1–4.8)
LYMPHOCYTES NFR BLD: 33.4 % (ref 18–48)
MAGNESIUM SERPL-MCNC: 1.9 MG/DL (ref 1.6–2.6)
MCH RBC QN AUTO: 29.6 PG (ref 27–31)
MCHC RBC AUTO-ENTMCNC: 32.2 G/DL (ref 32–36)
MCV RBC AUTO: 92 FL (ref 82–98)
MONOCYTES # BLD AUTO: 0.6 K/UL (ref 0.3–1)
MONOCYTES NFR BLD: 7.6 % (ref 4–15)
NEUTROPHILS # BLD AUTO: 4.1 K/UL (ref 1.8–7.7)
NEUTROPHILS NFR BLD: 55.5 % (ref 38–73)
NRBC BLD-RTO: 0 /100 WBC
PHOSPHATE SERPL-MCNC: 3.5 MG/DL (ref 2.7–4.5)
PLATELET # BLD AUTO: 268 K/UL (ref 150–350)
PMV BLD AUTO: 10 FL (ref 9.2–12.9)
POTASSIUM SERPL-SCNC: 3.9 MMOL/L (ref 3.5–5.1)
PROT SERPL-MCNC: 6.8 G/DL (ref 6–8.4)
RBC # BLD AUTO: 4.32 M/UL (ref 4.6–6.2)
SODIUM SERPL-SCNC: 141 MMOL/L (ref 136–145)
WBC # BLD AUTO: 7.37 K/UL (ref 3.9–12.7)

## 2019-12-11 PROCEDURE — 85025 COMPLETE CBC W/AUTO DIFF WBC: CPT

## 2019-12-11 PROCEDURE — 83735 ASSAY OF MAGNESIUM: CPT

## 2019-12-11 PROCEDURE — 80053 COMPREHEN METABOLIC PANEL: CPT

## 2019-12-11 PROCEDURE — 97116 GAIT TRAINING THERAPY: CPT

## 2019-12-11 PROCEDURE — 97535 SELF CARE MNGMENT TRAINING: CPT

## 2019-12-11 PROCEDURE — 86580 TB INTRADERMAL TEST: CPT | Performed by: PHYSICIAN ASSISTANT

## 2019-12-11 PROCEDURE — 84100 ASSAY OF PHOSPHORUS: CPT

## 2019-12-11 PROCEDURE — 25000003 PHARM REV CODE 250: Performed by: NURSE PRACTITIONER

## 2019-12-11 PROCEDURE — 25000003 PHARM REV CODE 250: Performed by: PSYCHIATRY & NEUROLOGY

## 2019-12-11 PROCEDURE — 99233 SBSQ HOSP IP/OBS HIGH 50: CPT | Mod: ,,, | Performed by: PSYCHIATRY & NEUROLOGY

## 2019-12-11 PROCEDURE — 30200315 PPD INTRADERMAL TEST REV CODE 302: Performed by: PHYSICIAN ASSISTANT

## 2019-12-11 PROCEDURE — 36415 COLL VENOUS BLD VENIPUNCTURE: CPT

## 2019-12-11 PROCEDURE — 63600175 PHARM REV CODE 636 W HCPCS: Performed by: NURSE PRACTITIONER

## 2019-12-11 PROCEDURE — 92507 TX SP LANG VOICE COMM INDIV: CPT

## 2019-12-11 PROCEDURE — 97110 THERAPEUTIC EXERCISES: CPT

## 2019-12-11 PROCEDURE — 25000003 PHARM REV CODE 250: Performed by: STUDENT IN AN ORGANIZED HEALTH CARE EDUCATION/TRAINING PROGRAM

## 2019-12-11 PROCEDURE — 99233 PR SUBSEQUENT HOSPITAL CARE,LEVL III: ICD-10-PCS | Mod: ,,, | Performed by: PSYCHIATRY & NEUROLOGY

## 2019-12-11 PROCEDURE — 11000001 HC ACUTE MED/SURG PRIVATE ROOM

## 2019-12-11 RX ORDER — DICLOFENAC SODIUM 10 MG/G
2 GEL TOPICAL 2 TIMES DAILY
Status: DISCONTINUED | OUTPATIENT
Start: 2019-12-11 | End: 2019-12-17 | Stop reason: HOSPADM

## 2019-12-11 RX ADMIN — Medication 5 UNITS: at 05:12

## 2019-12-11 RX ADMIN — ASPIRIN 81 MG: 81 TABLET, COATED ORAL at 09:12

## 2019-12-11 RX ADMIN — ATORVASTATIN CALCIUM 40 MG: 20 TABLET, FILM COATED ORAL at 08:12

## 2019-12-11 RX ADMIN — ACETAMINOPHEN 650 MG: 325 TABLET ORAL at 09:12

## 2019-12-11 RX ADMIN — ACETAMINOPHEN 650 MG: 325 TABLET ORAL at 05:12

## 2019-12-11 RX ADMIN — SENNOSIDES AND DOCUSATE SODIUM 1 TABLET: 8.6; 5 TABLET ORAL at 08:12

## 2019-12-11 RX ADMIN — HEPARIN SODIUM 5000 UNITS: 5000 INJECTION INTRAVENOUS; SUBCUTANEOUS at 02:12

## 2019-12-11 RX ADMIN — HEPARIN SODIUM 5000 UNITS: 5000 INJECTION INTRAVENOUS; SUBCUTANEOUS at 05:12

## 2019-12-11 RX ADMIN — SENNOSIDES AND DOCUSATE SODIUM 1 TABLET: 8.6; 5 TABLET ORAL at 09:12

## 2019-12-11 RX ADMIN — LISINOPRIL 10 MG: 5 TABLET ORAL at 09:12

## 2019-12-11 RX ADMIN — HEPARIN SODIUM 5000 UNITS: 5000 INJECTION INTRAVENOUS; SUBCUTANEOUS at 11:12

## 2019-12-11 NOTE — PROGRESS NOTES
SleepSMART  PI: Janes Gaona MD   : Tammie Ac   Subject# [id number]          Performed MAGGY sleep test on subject on the night of 12/09/2019. Per results from the central reader, subject is eligible for the CPAP run-in night. Run-in night was performed on the night of 12/10/2019, and per results of CPAP, subject is eligible for randomization.     PI confirms that subject remains eligible for the SleepSMART study, and is eligible for randomization. Subject was randomized on 12/11/2019 to the usual care arm. Stroke symptom recognition handout provided to the subject.

## 2019-12-11 NOTE — PLAN OF CARE
Problem: Physical Therapy Goal  Goal: Physical Therapy Goal  Description  Goals to be met by:      Patient will increase functional independence with mobility by performin. Supine to sit with Modified [place]  2. Sit to supine with Modified [place]  3. Sit to stand transfer with Modified [place]  4. Ambulate 3 minutes with supervision assistance while performing dynamic head turns, sudden change in speed and direction, withstand dynamic perturbations with no loss of balance.     5. Lower extremity exercise program x20 reps per handout, with independence to improve strength and activity tolerance.   6. Increase Gonsalves Balance Score to 56/56 to improve balance and decrease risk of falls.       Outcome: Ongoing, Progressing.  Patient overall mobility continues to progress really well.

## 2019-12-11 NOTE — PLAN OF CARE
Problem: SLP Goal  Goal: SLP Goal  Description  Speech Language Pathology Goals  Goals expected to be met by 12/14/19  1. Pt will tolerate dental soft diet with thin liquids with adequate oral clearance and no overt S/S aspiration, MOD I  2. Pt will complete automatic speech tasks with 90% accuracy, MIN A  3. Pt will name common objects with 75% accuracy or higher, MIN A  4. Pt will answer YNQ with 90% accuracy, MIN A  5. Pt will follow simple commands with 90% accuracy, MIN A  6. Pt will participate in further assessment of cognition, reading and writing   7. Educate Pt and family on safety awareness and compensatory strategies for fx communication      Outcome: Ongoing, Progressing  Language skills appear to have improved, but deficits evident during conversational speech.  Goals may be advanced when due.    MONICO Feliz, CCC-SLP  Speech Language Pathologist  [phone]  12/11/2019

## 2019-12-11 NOTE — PT/OT/SLP PROGRESS
"Speech Language Pathology Treatment    Patient Name:  Riaz Lane   MRN:  [id number]  Admitting Diagnosis: Embolic stroke involving left middle cerebral artery    Recommendations:                 General Recommendations:  Speech/language therapy and Cognitive-linguistic therapy  Diet recommendations:  Regular, Liquid Diet Level: Thin (SLP most recent diet recs were for dental soft consistencies, but diet orders are for regular consistencies.  No pocketing observed in oral cavity after breakfast meal today. Consider downgrading to dental soft if pocketing or impaired oral management of regular solids noted).   Aspiration Precautions: 1 bite/sip at a time, Alternating bites/sips, HOB to 90 degrees, Monitor for s/s of aspiration, Small bites/sips and Standard aspiration precautions   General Precautions: Standard, aphasia  Communication strategies:  go to room if call light pushed    Subjective     "It gets better every day, I think." pt referring to communication skills.     Pain/Comfort:  · Pain Rating 1: 0/10    Objective:     Has the patient been evaluated by SLP for swallowing?   Yes  Keep patient NPO? No   Current Respiratory Status: room air      Pt found sitting up in room awake/alert.  Breakfast tray noted to have been 100% consumed. Pt currently receiving regular solids (SLP's most recent recommendations were for dental soft).  Oral cavity examined and no pocketing observed. Oral motor function, strength, and ROM appears to WFL with no evidence of right facial droop.  Pt was oriented to month and year ind'ly.  Cues were needed to fully orient to situation.  Pt required an external aid to orient to place/[facility], but was unable to pronounce correctly.  Difficult to determine if this was due to language impairment or decreased familiarity with facility.  Pt followed 1-2 step commands with 100% acc ind'ly, but required cues to follow a 3-step command.  Pt answered 3-unit yes/no q's with 100% accuracy and complex " yes/no q's with 80% accuracy ind'ly/100% given cues.  Pt recited the days of the week ind'ly and months of the year with supervision.  Objects in the room were named with 90% accuracy ind'ly/100% given cues.  Following a nearly 3 minute delay, pt recalled 3/3 unrelated words given moderate cues (1/3 ind''ly). Education provided to pt regarding stroke, effects of stroke, language and cognitive impairments associated with stroke, progress towards meeting goals, and SLP treatment plan and POC. Pt expressed understanding, but full understanding questionable given cognitive-linguistic deficits.     Assessment:     Riaz Lane is a 59 y.o. male with an SLP diagnosis of Aphasia and Cognitive-Linguistic Impairment.     Goals:   Multidisciplinary Problems     SLP Goals        Problem: SLP Goal    Goal Priority Disciplines Outcome   SLP Goal     SLP Ongoing, Progressing   Description:  Speech Language Pathology Goals  Goals expected to be met by 12/14/19  1. Pt will tolerate dental soft diet with thin liquids with adequate oral clearance and no overt S/S aspiration, MOD I  2. Pt will complete automatic speech tasks with 90% accuracy, MIN A  3. Pt will name common objects with 75% accuracy or higher, MIN A  4. Pt will answer YNQ with 90% accuracy, MIN A  5. Pt will follow simple commands with 90% accuracy, MIN A  6. Pt will participate in further assessment of cognition, reading and writing   7. Educate Pt and family on safety awareness and compensatory strategies for fx communication                       Plan:     · Patient to be seen:  4 x/week   · Plan of Care expires:  01/06/20  · Plan of Care reviewed with:  patient   · SLP Follow-Up:  Yes       Discharge recommendations:  outpatient speech therapy     Time Tracking:     SLP Treatment Date:   12/11/19  Speech Start Time:  0906  Speech Stop Time:  0925     Speech Total Time (min):  19 min    Billable Minutes: Speech Therapy Individual 11 and Seld Care/Home Management  Training 8    MONICO Feliz, CCC-SLP  12/11/2019     MONICO Feliz, CCC-SLP  Speech Language Pathologist  [phone]  12/11/2019

## 2019-12-11 NOTE — PLAN OF CARE
Patient AAOX3. Patient c/p headache once during shift relieved with tylenol.  VSS.  Tolerarted scheduled mills.   IV site intact; saline locked.  Safety maintained; no injuries.  Call light within reach.  No acute distress noted. Will continue to monitor.

## 2019-12-11 NOTE — PT/OT/SLP PROGRESS
Physical Therapy Treatment    Patient Name:  Riaz Lane   MRN:  [id number]    Recommendations:     Discharge Recommendations:  outpatient PT   Discharge Equipment Recommendations: none   Barriers to discharge: None    Assessment:     Riaz Lane is a 59 y.o. male admitted with a medical diagnosis of Embolic stroke involving left middle cerebral artery.  He presents with the following impairments/functional limitations:  weakness, impaired sensation, impaired self care skills . Patient showed improved balance and showed ability to talk. Patient ambulates with consistent edie, no loss of balance or safety issues noted.    Rehab Prognosis: Good; patient would benefit from acute skilled PT services to address these deficits and reach maximum level of function.    Recent Surgery: Procedure(s) (LRB):  ANGIOGRAM-CEREBRAL (N/A) 5 Days Post-Op    Plan:     During this hospitalization, patient to be seen 3 x/week to address the identified rehab impairments via gait training, therapeutic activities, therapeutic exercises, neuromuscular re-education and progress toward the following goals:    · Plan of Care Expires:  01/06/20    Subjective     Chief Complaint: none  Patient/Family Comments/goals: to go home soon.  Pain/Comfort:  · Pain Rating 1: 0/10  · Pain Rating Post-Intervention 1: 0/10      Objective:     Communicated with nsg prior to session.  Patient found HOB elevated with telemetry upon PT entry to room.     General Precautions: Standard, aphasia   Orthopedic Precautions:N/A, RLE anterior precautions   Braces: N/A     Functional Mobility:  · Bed Mobility:     · Rolling Right: modified independence  · Scooting: modified independence  · Supine to Sit: modified independence  · Transfers:     · Sit to Stand:  modified independence with no AD  · Gait: 240 ft with no AD with SBA.      AM-PAC 6 CLICK MOBILITY  Turning over in bed (including adjusting bedclothes, sheets and blankets)?: 4  Sitting down on and standing up from  a chair with arms (e.g., wheelchair, bedside commode, etc.): 4  Moving from lying on back to sitting on the side of the bed?: 4  Moving to and from a bed to a chair (including a wheelchair)?: 4  Need to walk in hospital room?: 4  Climbing 3-5 steps with a railing?: 4  Basic Mobility Total Score: 24       Therapeutic Activities and Exercises:   Donned as second gown. There ex in standing: MINI SQUATS, HIP FLEX AND HEEL/TOE RAISES 2X12 REPS B LE.    Patient left HOB elevated with all lines intact and call button in reach..    GOALS:   Multidisciplinary Problems     Physical Therapy Goals        Problem: Physical Therapy Goal    Goal Priority Disciplines Outcome Goal Variances Interventions   Physical Therapy Goal     PT, PT/OT Ongoing, Progressing     Description:  Goals to be met by:      Patient will increase functional independence with mobility by performin. Supine to sit with Modified [place]  2. Sit to supine with Modified [place]  3. Sit to stand transfer with Modified [place]  4. Ambulate 3 minutes with supervision assistance while performing dynamic head turns, sudden change in speed and direction, withstand dynamic perturbations with no loss of balance.     5. Lower extremity exercise program x20 reps per handout, with independence to improve strength and activity tolerance.   6. Increase Gonsalves Balance Score to 56/56 to improve balance and decrease risk of falls.                        Time Tracking:     PT Received On: 19  PT Start Time: 1353     PT Stop Time: 1418  PT Total Time (min): 25 min     Billable Minutes: Gait Training 15 and Therapeutic Exercise 10    Treatment Type: Treatment  PT/PTA: PTA     PTA Visit Number: 1     Ronaldo Reyes PTA  2019

## 2019-12-11 NOTE — PLAN OF CARE
12/11/19 1458   Post-Acute Status   Post-Acute Authorization Placement   Post-Acute Placement Status Referrals Sent       Pt currently lives in a barn. Pt has medical issues to which he needs to have stable housing and be close to the [facility] for care. Pt would like to stay on the [facility]. Referrals sent to [facility].  PASSR completed and 142 called in to the [facility]. Awaiting 142 to be sent to the  to complete referrals for NH placement.   in contact with CM and Medical staff. Will continue to follow and offer support as needed.     Javy Montes De Oca, LMSW [facility]   Ext. [phone]

## 2019-12-11 NOTE — NURSING
Pt AAO x4   No complaints of pain, no s/s of distress, VS stable & WDL  Pt up in the chair most of the day; returned to bed at this time; bed alarm set, call light & urinal within reach.    16:15  Pt left unit with transport via stretcher for soft tissue CT of the neck; pt has been NPO for 4 hours prior to CT- only taking small sips of water, no food/ po meds administered 4 hrs prior to CT.  Pt has no complaints, no s/s of distress, verbalized understanding of procedure. Will continue to monitor/ re-assess when pt returns \    17:00  Pt returned from ct  Vs stable  No s/s of distress, no complaints  Iv flushed, patent & saline locked  Bed alarm set, call light & urinal within reach will continue to assess

## 2019-12-11 NOTE — RESEARCH
SleepSMART Study Randomization Note    The patient was randomized to usual care.  He will f/u with me as an outpatient per protocol.  After 6 months, I will refer him to a Sleep Disorders provider for CPAP.    Janes Gaona MD

## 2019-12-12 PROBLEM — L03.90 CELLULITIS: Status: ACTIVE | Noted: 2019-12-12

## 2019-12-12 LAB
ALBUMIN SERPL BCP-MCNC: 3 G/DL (ref 3.5–5.2)
ALP SERPL-CCNC: 62 U/L (ref 55–135)
ALT SERPL W/O P-5'-P-CCNC: 11 U/L (ref 10–44)
ANION GAP SERPL CALC-SCNC: 7 MMOL/L (ref 8–16)
AST SERPL-CCNC: 12 U/L (ref 10–40)
BASOPHILS # BLD AUTO: 0.08 K/UL (ref 0–0.2)
BASOPHILS NFR BLD: 1.1 % (ref 0–1.9)
BILIRUB SERPL-MCNC: 0.3 MG/DL (ref 0.1–1)
BUN SERPL-MCNC: 15 MG/DL (ref 6–20)
CALCIUM SERPL-MCNC: 9 MG/DL (ref 8.7–10.5)
CHLORIDE SERPL-SCNC: 104 MMOL/L (ref 95–110)
CO2 SERPL-SCNC: 27 MMOL/L (ref 23–29)
CREAT SERPL-MCNC: 0.7 MG/DL (ref 0.5–1.4)
DIFFERENTIAL METHOD: ABNORMAL
EOSINOPHIL # BLD AUTO: 0.2 K/UL (ref 0–0.5)
EOSINOPHIL NFR BLD: 2.4 % (ref 0–8)
ERYTHROCYTE [DISTWIDTH] IN BLOOD BY AUTOMATED COUNT: 13.2 % (ref 11.5–14.5)
EST. GFR  (AFRICAN AMERICAN): >60 ML/MIN/1.73 M^2
EST. GFR  (NON AFRICAN AMERICAN): >60 ML/MIN/1.73 M^2
GLUCOSE SERPL-MCNC: 97 MG/DL (ref 70–110)
HCT VFR BLD AUTO: 39.4 % (ref 40–54)
HGB BLD-MCNC: 12.8 G/DL (ref 14–18)
IMM GRANULOCYTES # BLD AUTO: 0.02 K/UL (ref 0–0.04)
IMM GRANULOCYTES NFR BLD AUTO: 0.3 % (ref 0–0.5)
LYMPHOCYTES # BLD AUTO: 2.1 K/UL (ref 1–4.8)
LYMPHOCYTES NFR BLD: 28.1 % (ref 18–48)
MAGNESIUM SERPL-MCNC: 1.9 MG/DL (ref 1.6–2.6)
MCH RBC QN AUTO: 29.9 PG (ref 27–31)
MCHC RBC AUTO-ENTMCNC: 32.5 G/DL (ref 32–36)
MCV RBC AUTO: 92 FL (ref 82–98)
MONOCYTES # BLD AUTO: 0.6 K/UL (ref 0.3–1)
MONOCYTES NFR BLD: 8.1 % (ref 4–15)
NEUTROPHILS # BLD AUTO: 4.5 K/UL (ref 1.8–7.7)
NEUTROPHILS NFR BLD: 60 % (ref 38–73)
NRBC BLD-RTO: 0 /100 WBC
PHOSPHATE SERPL-MCNC: 3.5 MG/DL (ref 2.7–4.5)
PLATELET # BLD AUTO: 285 K/UL (ref 150–350)
PMV BLD AUTO: 9.6 FL (ref 9.2–12.9)
POTASSIUM SERPL-SCNC: 4 MMOL/L (ref 3.5–5.1)
PROT SERPL-MCNC: 6.8 G/DL (ref 6–8.4)
RBC # BLD AUTO: 4.28 M/UL (ref 4.6–6.2)
SODIUM SERPL-SCNC: 138 MMOL/L (ref 136–145)
WBC # BLD AUTO: 7.51 K/UL (ref 3.9–12.7)

## 2019-12-12 PROCEDURE — 36415 COLL VENOUS BLD VENIPUNCTURE: CPT

## 2019-12-12 PROCEDURE — 84100 ASSAY OF PHOSPHORUS: CPT

## 2019-12-12 PROCEDURE — 63600175 PHARM REV CODE 636 W HCPCS: Performed by: NURSE PRACTITIONER

## 2019-12-12 PROCEDURE — A4216 STERILE WATER/SALINE, 10 ML: HCPCS | Performed by: STUDENT IN AN ORGANIZED HEALTH CARE EDUCATION/TRAINING PROGRAM

## 2019-12-12 PROCEDURE — 80053 COMPREHEN METABOLIC PANEL: CPT

## 2019-12-12 PROCEDURE — 99233 PR SUBSEQUENT HOSPITAL CARE,LEVL III: ICD-10-PCS | Mod: ,,, | Performed by: PSYCHIATRY & NEUROLOGY

## 2019-12-12 PROCEDURE — 11000001 HC ACUTE MED/SURG PRIVATE ROOM

## 2019-12-12 PROCEDURE — 25000003 PHARM REV CODE 250: Performed by: NURSE PRACTITIONER

## 2019-12-12 PROCEDURE — 85025 COMPLETE CBC W/AUTO DIFF WBC: CPT

## 2019-12-12 PROCEDURE — 25000003 PHARM REV CODE 250: Performed by: PSYCHIATRY & NEUROLOGY

## 2019-12-12 PROCEDURE — 25000003 PHARM REV CODE 250: Performed by: PHYSICIAN ASSISTANT

## 2019-12-12 PROCEDURE — 83735 ASSAY OF MAGNESIUM: CPT

## 2019-12-12 PROCEDURE — 97535 SELF CARE MNGMENT TRAINING: CPT

## 2019-12-12 PROCEDURE — 25000003 PHARM REV CODE 250: Performed by: STUDENT IN AN ORGANIZED HEALTH CARE EDUCATION/TRAINING PROGRAM

## 2019-12-12 PROCEDURE — 97112 NEUROMUSCULAR REEDUCATION: CPT

## 2019-12-12 PROCEDURE — 99233 SBSQ HOSP IP/OBS HIGH 50: CPT | Mod: ,,, | Performed by: PSYCHIATRY & NEUROLOGY

## 2019-12-12 RX ORDER — SULFAMETHOXAZOLE AND TRIMETHOPRIM 800; 160 MG/1; MG/1
1 TABLET ORAL 2 TIMES DAILY
Status: DISCONTINUED | OUTPATIENT
Start: 2019-12-12 | End: 2019-12-13

## 2019-12-12 RX ORDER — MUPIROCIN 20 MG/G
OINTMENT TOPICAL 2 TIMES DAILY
Status: COMPLETED | OUTPATIENT
Start: 2019-12-12 | End: 2019-12-16

## 2019-12-12 RX ADMIN — HEPARIN SODIUM 5000 UNITS: 5000 INJECTION INTRAVENOUS; SUBCUTANEOUS at 10:12

## 2019-12-12 RX ADMIN — LISINOPRIL 10 MG: 5 TABLET ORAL at 11:12

## 2019-12-12 RX ADMIN — SENNOSIDES AND DOCUSATE SODIUM 1 TABLET: 8.6; 5 TABLET ORAL at 08:12

## 2019-12-12 RX ADMIN — DICLOFENAC 2 G: 10 GEL TOPICAL at 09:12

## 2019-12-12 RX ADMIN — HEPARIN SODIUM 5000 UNITS: 5000 INJECTION INTRAVENOUS; SUBCUTANEOUS at 01:12

## 2019-12-12 RX ADMIN — SULFAMETHOXAZOLE AND TRIMETHOPRIM 1 TABLET: 800; 160 TABLET ORAL at 11:12

## 2019-12-12 RX ADMIN — MUPIROCIN: 20 OINTMENT TOPICAL at 09:12

## 2019-12-12 RX ADMIN — ATORVASTATIN CALCIUM 40 MG: 20 TABLET, FILM COATED ORAL at 09:12

## 2019-12-12 RX ADMIN — ASPIRIN 81 MG: 81 TABLET, COATED ORAL at 08:12

## 2019-12-12 RX ADMIN — Medication 10 ML: at 11:12

## 2019-12-12 RX ADMIN — SULFAMETHOXAZOLE AND TRIMETHOPRIM 1 TABLET: 800; 160 TABLET ORAL at 09:12

## 2019-12-12 RX ADMIN — MUPIROCIN: 20 OINTMENT TOPICAL at 11:12

## 2019-12-12 RX ADMIN — ACETAMINOPHEN 650 MG: 325 TABLET ORAL at 09:12

## 2019-12-12 RX ADMIN — HEPARIN SODIUM 5000 UNITS: 5000 INJECTION INTRAVENOUS; SUBCUTANEOUS at 06:12

## 2019-12-12 RX ADMIN — SENNOSIDES AND DOCUSATE SODIUM 1 TABLET: 8.6; 5 TABLET ORAL at 09:12

## 2019-12-12 NOTE — PROGRESS NOTES
[facility]  Vascular Neurology  Comprehensive Stroke Center  Progress Note    Assessment/Plan:     * Embolic stroke involving left middle cerebral artery  60 y.o. yo male with unknown past medical history who presented to [place] with AMS s/p fall. LKN ~1130 pm on 12/5. CTA completed at OSH revealed left MCA proximal occlusion. Patient transferred to [facility] for possible intervention; VAN +. Patient taken to IR and is now s/p successful thrombectomy with TICI 2b flow. Admitted to [facility] for close monitoring/higher level of care.     Noted Left MCA distribution infarct with small hemorrhagic conversion in the insula and temporal lobe on MRI Brain. Etiology ESUS at this time. Considering EP referral for loop recorder placement at discharge.    Patient progressing well; dispo outpatient therapy. Plans for [facility] nursing home placement as patient does not have a safe discharge plan.       Antithrombotics for secondary stroke prevention: ASA 81mg QD  Statins for secondary stroke prevention and hyperlipidemia, if present:   Statins: Atorvastatin- 40 mg daily  Aggressive risk factor modification: HTN, HLD, Diet, Exercise, Obesity  Rehab efforts: The patient has been evaluated by a stroke team provider and the therapy needs have been fully considered based off the presenting complaints and exam findings. The following therapy evaluations are needed: PT evaluate and treat, OT evaluate and treat, SLP evaluate and treat, PM&R evaluate for appropriate placement- Dispo outpatient therapy however electing for [facility] nursing home placement  Diagnostics ordered/pending: none  VTE prophylaxis: Heparin 5000 units SQ every 8 hours; SCDs  BP parameters: Infarct: Post successful thrombectomy, SBP < 160    Oropharyngeal mass  Pt reports hx R neck/inferior to jaw mass present for the last year or so; denies prior evaluations. He denies hx chewing tobacco abuse. Reports occasional R tonsillar bleeding as well.  CT  Soft Tissue Neck demonstrating mass with multiple adjacent necrotic and large, solid lymph nodes. Concern for neoplastic process. Discussed these findings with patient.  Curbside to ENT; Pt would likely not be able to be added to their schedule for biopsy this as well as would make more sense for biopsy to be performed by team who will be following outpatient.  CM/SW to assist with establishing care (I.e. ENT, Onc, Rad Onc) through [facility].    Essential hypertension  Stroke risk factor  SBP < 160  BP stable on current regimen    Cytotoxic cerebral edema  Area of cytotoxic cerebral edema identified when reviewing brain imaging in the territory of the left middle cerebral artery. There (is/is not) mass effect associated with it. We will continue to monitor the patients clinical exam for any worsening of symptoms which may indicate expansion of the stroke or the area of the edema resulting in the clinical change. The pattern is suggestive of ESUS etiology.    Mixed hyperlipidemia  -stroke risk factor     Recommend Atorvastatin 40 mg     Aphasia due to acute cerebrovascular accident (CVA)  Now significantly improved/near resolved  Continue aggressive SLP         12/08/2019 improvement of expressive aphasia today   12/09/2019: PORTILLO. BP elevated overnight. Coreg discontinued. Lisinopril started.   12/10/2019: Patient progressing well. PT/OT now recommending home with outpatient therapy. Patient is homeless. CM/SW working to plan safe discharge.   12/11: CT Soft Tissue Neck demonstrating mass; curbside to ENT. Plans for [facility] as patient does not have a safe discharge plan.     STROKE DOCUMENTATION   Acute Stroke Times   Last Known Normal Date: 12/05/19  Last Known Normal Time: 2330  Stroke Team Called Date: 12/06/19  Stroke Team Called Time: 0800  Stroke Team Arrival Date: 12/06/19  Stroke Team Arrival Time: 0804  CT Interpretation Time: 0810  Decision to Treat Time  for Alteplase: (N/A)    NIH Scale:  1a. Level of Consciousness: 0-->Alert, keenly responsive  1b. LOC Questions: 0-->Answers both questions correctly  1c. LOC Commands: 0-->Performs both tasks correctly  2. Best Gaze: 0-->Normal  3. Visual: 0-->No visual loss  4. Facial Palsy: 0-->Normal symmetrical movements  5a. Motor Arm, Left: 0-->No drift, limb holds 90 (or 45) degrees for full 10 secs  5b. Motor Arm, Right: 0-->No drift, limb holds 90 (or 45) degrees for full 10 secs  6a. Motor Leg, Left: 0-->No drift, leg holds 30 degree position for full 5 secs  6b. Motor Leg, Right: 0-->No drift, leg holds 30 degree position for full 5 secs  7. Limb Ataxia: 0-->Absent  8. Sensory: 0-->Normal, no sensory loss  9. Best Language: 0-->No aphasia, normal  10. Dysarthria: 0-->Normal  11. Extinction and Inattention (formerly Neglect): 0-->No abnormality  Total (NIH Stroke Scale): 0       Modified Sharmaine Score: (unknown)  Allison Coma Scale:    ABCD2 Score:    [id number] Score:   HAS -BLED Score:   ICH Score:   Hunt & Cook Classification:      Hemorrhagic change of an Ischemic Stroke: Does this patient have an ischemic stroke with hemorrhagic changes? Yes, Grading Scale: HI Type 1 (HI-1) = small petechiae along the margins of the infarct. Is this a symptomatic change?  No - Hemorrhage is not clinically significant     Neurologic Chief Complaint:  L MCA, expressive aphasia     Subjective:     Interval History: Patient is seen for follow-up neurological assessment and treatment recommendations: PORTILLO. CT Soft Tissue Neck demonstrating mass; curbside to ENT. Plans for [facility] nursing home placement as patient does not have a safe discharge plan.     HPI, Past Medical, Family, and Social History remains the same as documented in the initial encounter.     Review of Systems   Constitutional: Negative for fatigue and fever.   HENT: Positive for mouth sores (reports occasional bleeding R tonsil). Negative for trouble swallowing.     Eyes: Negative for discharge and visual disturbance.   Neurological: Negative for facial asymmetry, speech difficulty, weakness, light-headedness and numbness.   Psychiatric/Behavioral: Negative for confusion and decreased concentration.     Scheduled Meds:   aspirin  81 mg Oral Daily    atorvastatin  40 mg Oral QHS    heparin (porcine)  5,000 Units Subcutaneous Q8H    lisinopril  10 mg Oral Daily    senna-docusate 8.6-50 mg  1 tablet Oral BID     Continuous Infusions:  PRN Meds:acetaminophen, sodium chloride 0.9%, sodium chloride 0.9%    Objective:     Vital Signs (Most Recent):  Temp: 98 °F (36.7 °C) (12/11/19 1607)  Pulse: 75 (12/11/19 1607)  Resp: 20 (12/11/19 1607)  BP: 137/70 (12/11/19 1607)  SpO2: 98 % (12/11/19 1607)  BP Location: Right arm    Vital Signs Range (Last 24H):  Temp:  [98 °F (36.7 °C)-98.3 °F (36.8 °C)]   Pulse:  [60-75]   Resp:  [14-20]   BP: (124-152)/(70-85)   SpO2:  [91 %-98 %]   BP Location: Right arm    Physical Exam   Constitutional: He is oriented to person, place, and time. He appears well-developed. No distress.   HENT:   Head: Normocephalic and atraumatic.   Eyes: Conjunctivae and EOM are normal.   Cardiovascular: Normal rate.   Pulmonary/Chest: Effort normal. No respiratory distress.   Musculoskeletal: Normal range of motion. He exhibits no edema or deformity.   Neurological: He is alert and oriented to person, place, and time. No sensory deficit. He exhibits normal muscle tone.   Skin: Skin is warm and dry. He is not diaphoretic.   Psychiatric: He has a normal mood and affect. His behavior is normal.   Vitals reviewed.      Neurological Exam:   LOC: alert  Attention Span: Good   Language: No aphasia  Articulation: No dysarthria  Orientation: Person, Place, Time   Visual Fields: Full  EOM (CN III, IV, VI): Full/intact  Facial Movement (CN VII): Symmetric facial expression    Motor: Arm left  Normal 5/5  Leg left  Normal 5/5  Arm right  Normal 5/5  Leg right Normal  5/5  Sensation: Intact to light touch, temperature and vibration  Tone: Normal tone throughout    Laboratory:  CMP:   Recent Labs   Lab 12/11/19  0402   CALCIUM 9.2   ALBUMIN 3.0*   PROT 6.8      K 3.9   CO2 23      BUN 14   CREATININE 0.7   ALKPHOS 65   ALT 11   AST 14   BILITOT 0.5     BMP:   Recent Labs   Lab 12/11/19  0402      K 3.9      CO2 23   BUN 14   CREATININE 0.7   CALCIUM 9.2     CBC:   Recent Labs   Lab 12/11/19  0402   WBC 7.37   RBC 4.32*   HGB 12.8*   HCT 39.8*      MCV 92   MCH 29.6   MCHC 32.2     Lipid Panel:   Recent Labs   Lab 12/06/19  0817   CHOL 218*   LDLCALC 144.0   HDL 53   TRIG 105     Coagulation:   Recent Labs   Lab 12/07/19  0307   INR 1.0   APTT 25.6     Platelet Aggregation Study: No results for input(s): PLTAGG, PLTAGINTERP, PLTAGREGLACO, ADPPLTAGGREG in the last 168 hours.  Hgb A1C:   Recent Labs   Lab 12/06/19  1635   HGBA1C 5.7*     TSH:   Recent Labs   Lab 12/06/19  0817   TSH 0.976       Diagnostic Results     Brain imaging:      CT Head 12/8/19  1. Evolving left MCA distribution infarct with superimposed petechial hemorrhage and mild mass effect resulting in minimal rightward midline shift of 1 mm.    MRI Brain 12/07/2019    1. Left MCA distribution infarct with hemorrhagic conversion in the insula and temporal lobe.  Associated localized mass effect without midline shift.  2. Chronic microvascular ischemic change.    CT Head 12/06/2019    Ill-defined regions of decreased attenuation left MCA territory specifically left insula, left posterior temporoparietal cortex and lateral aspect of the left lentiform nucleus most compatible with acute areas of infarction.  There is no significant mass effect or midline shift.  No evidence for hemorrhagic conversion.  There is slight hyperdensity associated with the intracranial vasculature likely related to recent contrast administration for outside institution angiography       Vessel Imaging     Cerebral  Angiogram 12/6/19  Technically successful aspiration thrombectomy of the left MCA distal M1 segment artery with resulting TICI 2b flow.       Cardiac Evaluation:     TTE 12/6/19  · Normal left ventricular systolic function. The estimated ejection fraction is 65%  · Eccentric left ventricular hypertrophy.  · Indeterminate left ventricular diastolic function.  · Normal right ventricular systolic function.  · Mild aortic valve stenosis. peak velocity is 2.94 m/s; mean gradient is 20 mmHg. ABBIE cannot be measured accurately.  · Normal central venous pressure (3 mm Hg).  · Mild mitral regurgitation.  · No wall motion abnormalities.         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  Abnormal conglomerate of level II/III lymph nodes with necrotic centers with surrounding inflammatory stranding and numerous adjacent enlarged solid lymph nodes.  Constellation of findings concerning for neoplastic process such as metastatic squamous cell carcinoma.      Elina Huff PA-C  Comprehensive Stroke Center  Department of Vascular Neurology   [facility]

## 2019-12-12 NOTE — SUBJECTIVE & OBJECTIVE
Neurologic Chief Complaint:  L MCA, expressive aphasia     Subjective:     Interval History: Patient is seen for follow-up neurological assessment and treatment recommendations:    Started on Bactrim DS X 5 days for LUE cellulitis. CM/SW arranging follow up at [place] for evaluation of neck mass.     HPI, Past Medical, Family, and Social History remains the same as documented in the initial encounter.     Review of Systems   Constitutional: Negative for fatigue and fever.   HENT: Positive for mouth sores (reports occasional bleeding R tonsil). Negative for trouble swallowing.    Eyes: Negative for discharge and visual disturbance.   Neurological: Negative for facial asymmetry, speech difficulty, weakness, light-headedness and numbness.   Psychiatric/Behavioral: Negative for confusion and decreased concentration.     Scheduled Meds:   aspirin  81 mg Oral Daily    atorvastatin  40 mg Oral QHS    diclofenac sodium  2 g Topical (Top) BID    heparin (porcine)  5,000 Units Subcutaneous Q8H    lisinopril  10 mg Oral Daily    mupirocin   Topical (Top) BID    senna-docusate 8.6-50 mg  1 tablet Oral BID    sulfamethoxazole-trimethoprim 800-160mg  1 tablet Oral BID     Continuous Infusions:  PRN Meds:acetaminophen, sodium chloride 0.9%, sodium chloride 0.9%    Objective:     Vital Signs (Most Recent):  Temp: 98 °F (36.7 °C) (12/12/19 0803)  Pulse: 71 (12/12/19 1109)  Resp: 18 (12/12/19 0803)  BP: 139/71 (12/12/19 0803)  SpO2: (!) 94 % (12/12/19 0803)  BP Location: Right arm    Vital Signs Range (Last 24H):  Temp:  [98 °F (36.7 °C)-98.9 °F (37.2 °C)]   Pulse:  [63-75]   Resp:  [18-20]   BP: (122-152)/(70-85)   SpO2:  [92 %-98 %]   BP Location: Right arm    Physical Exam   Constitutional: He is oriented to person, place, and time. He appears well-developed. No distress.   HENT:   Head: Normocephalic and atraumatic.   Eyes: Conjunctivae and EOM are normal.   Cardiovascular: Normal rate.   Pulmonary/Chest: Effort  normal. No respiratory distress.   Musculoskeletal: Normal range of motion. He exhibits no edema or deformity.   Neurological: He is alert and oriented to person, place, and time. No sensory deficit. He exhibits normal muscle tone.   Skin: Skin is warm and dry. He is not diaphoretic.   Psychiatric: He has a normal mood and affect. His behavior is normal.   Vitals reviewed.      Neurological Exam:   LOC: alert  Attention Span: Good   Language: No aphasia  Articulation: No dysarthria  Orientation: Person, Place, Time   Visual Fields: Full  EOM (CN III, IV, VI): Full/intact  Facial Movement (CN VII): Symmetric facial expression    Motor: Arm left  Normal 5/5  Leg left  Normal 5/5  Arm right  Normal 5/5  Leg right Normal 5/5  Sensation: Intact to light touch, temperature and vibration  Tone: Normal tone throughout    Laboratory:  CMP:   Recent Labs   Lab 12/12/19  0546   CALCIUM 9.0   ALBUMIN 3.0*   PROT 6.8      K 4.0   CO2 27      BUN 15   CREATININE 0.7   ALKPHOS 62   ALT 11   AST 12   BILITOT 0.3     BMP:   Recent Labs   Lab 12/12/19  0546      K 4.0      CO2 27   BUN 15   CREATININE 0.7   CALCIUM 9.0     CBC:   Recent Labs   Lab 12/12/19  0546   WBC 7.51   RBC 4.28*   HGB 12.8*   HCT 39.4*      MCV 92   MCH 29.9   MCHC 32.5     Lipid Panel:   Recent Labs   Lab 12/06/19  0817   CHOL 218*   LDLCALC 144.0   HDL 53   TRIG 105     Coagulation:   Recent Labs   Lab 12/07/19  0307   INR 1.0   APTT 25.6     Platelet Aggregation Study: No results for input(s): PLTAGG, PLTAGINTERP, PLTAGREGLACO, ADPPLTAGGREG in the last 168 hours.  Hgb A1C:   Recent Labs   Lab 12/06/19  1635   HGBA1C 5.7*     TSH:   Recent Labs   Lab 12/06/19  0817   TSH 0.976       Diagnostic Results     Brain imaging:      CT Head 12/8/19  1. Evolving left MCA distribution infarct with superimposed petechial hemorrhage and mild mass effect resulting in minimal rightward midline shift of 1 mm.    MRI Brain 12/07/2019    1. Left  MCA distribution infarct with hemorrhagic conversion in the insula and temporal lobe.  Associated localized mass effect without midline shift.  2. Chronic microvascular ischemic change.    CT Head 12/06/2019    Ill-defined regions of decreased attenuation left MCA territory specifically left insula, left posterior temporoparietal cortex and lateral aspect of the left lentiform nucleus most compatible with acute areas of infarction.  There is no significant mass effect or midline shift.  No evidence for hemorrhagic conversion.  There is slight hyperdensity associated with the intracranial vasculature likely related to recent contrast administration for outside institution angiography       Vessel Imaging     Cerebral Angiogram 12/6/19  Technically successful aspiration thrombectomy of the left MCA distal M1 segment artery with resulting TICI 2b flow.       Cardiac Evaluation:     TTE 12/6/19  · Normal left ventricular systolic function. The estimated ejection fraction is 65%  · Eccentric left ventricular hypertrophy.  · Indeterminate left ventricular diastolic function.  · Normal right ventricular systolic function.  · Mild aortic valve stenosis. peak velocity is 2.94 m/s; mean gradient is 20 mmHg. ABBIE cannot be measured accurately.  · Normal central venous pressure (3 mm Hg).  · Mild mitral regurgitation.  · No wall motion abnormalities.         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  Abnormal conglomerate of level II/III lymph nodes with necrotic centers with surrounding inflammatory stranding and numerous adjacent enlarged solid lymph nodes.  Constellation of findings concerning for neoplastic process such as metastatic squamous cell carcinoma.

## 2019-12-12 NOTE — ASSESSMENT & PLAN NOTE
60 y.o. yo male with unknown past medical history who presented to [place] with AMS s/p fall. LKN ~1130 pm on 12/5. CTA completed at OSH revealed left MCA proximal occlusion. Patient transferred [place] [facility] [place] possible intervention; VAN +. Patient taken to IR and is now s/p successful thrombectomy with TICI 2b flow. Admitted to [facility] for close monitoring/higher level of care.     Noted Left MCA distribution infarct with small hemorrhagic conversion in the insula and temporal lobe on MRI Brain. Etiology ESUS at this time. Considering EP referral for loop recorder placement at discharge.    Patient progressing well; dispo outpatient therapy. Plans for [facility] nursing home placement as patient does not have a safe discharge plan.       Antithrombotics for secondary stroke prevention: ASA 81mg QD  Statins for secondary stroke prevention and hyperlipidemia, if present:   Statins: Atorvastatin- 40 mg daily  Aggressive risk factor modification: HTN, HLD, Diet, Exercise, Obesity  Rehab efforts: The patient has been evaluated by a stroke team provider and the therapy needs have been fully considered based off the presenting complaints and exam findings. The following therapy evaluations are needed: PT evaluate and treat, OT evaluate and treat, SLP evaluate and treat, PM&R evaluate for appropriate placement- Dispo outpatient therapy however electing for snf nursing home placement  Diagnostics ordered/pending: none  VTE prophylaxis: Heparin 5000 units SQ every 8 hours; SCDs  BP parameters: Infarct: Post successful thrombectomy, SBP < 160

## 2019-12-12 NOTE — NURSING
Administered TB skin test to right FA circled with pink permanent marker  To be read 12/13 before 1700     L FA red, warm & tender & hard   Notified stroke team; came to assess/look at L FA ordered to use heat and monitor for increased swelling; swelling circled with pink marker to monitor the swelling, will continue to assess hourly

## 2019-12-12 NOTE — PLAN OF CARE
Problem: Skin Injury Risk Increased  Goal: Skin Health and Integrity  Outcome: Ongoing, Progressing     Problem: Adult Inpatient Plan of Care  Goal: Plan of Care Review  Outcome: Ongoing, Progressing  Goal: Patient-Specific Goal (Individualization)  Description  Admit Date: 12/6/2019    Admit Dx: l mca    History reviewed. No pertinent past medical history.    History reviewed. No pertinent surgical history.    Individualization:   1. Pt likes dim lights    Restraints: (date/time/why or N/A) na       Outcome: Ongoing, Progressing

## 2019-12-12 NOTE — ASSESSMENT & PLAN NOTE
Pt reports hx R neck/inferior to jaw mass present for the last year or so; denies prior evaluations. He denies hx chewing tobacco abuse. Reports occasional R tonsillar bleeding as well.  CT Soft Tissue Neck demonstrating mass with multiple adjacent necrotic and large, solid lymph nodes. Concern for neoplastic process. Discussed these findings with patient.  Curbside to ENT; Pt would likely not be able to be added to their schedule for biopsy this as well as would make more sense for biopsy to be performed by team who will be following outpatient.  CM/SW to assist with establishing care (I.e. ENT, Onc, Rad Onc) through [facility].

## 2019-12-12 NOTE — ASSESSMENT & PLAN NOTE
LUE with erythema, tenderness, and hard to palpation - previous IV site   US completed - Partially occlusive thrombosis of the cephalic vein (superficial vein); No DVT  Started patient on Bactrim DS BID X 5 days, Bactroban BID X 5 days, and warm compress   Continue to monitor

## 2019-12-12 NOTE — PT/OT/SLP PROGRESS
"Occupational Therapy   Treatment    Name: Riaz Lane  MRN: [id number]  Admitting Diagnosis:  Embolic stroke involving left middle cerebral artery  6 Days Post-Op    Recommendations:     Discharge Recommendations: outpatient OT  Discharge Equipment Recommendations:  none  Barriers to discharge:  (patient is homeless)    Assessment:     Riaz Lane is a 59 y.o. male with a medical diagnosis of Embolic stroke involving left middle cerebral artery.  He presents with performance deficits affecting function are weakness, impaired endurance, impaired sensation, impaired cognition, decreased safety awareness, impaired cardiopulmonary response to activity.  Patient presented with improved expressive aphasia and improved gait during ambulation. Today's session focused on ambulating household distances, higher level cognitive tasks. Once medically stable, patient would benefit from continued skilled OT services to maximize return to PLOF.     Rehab Prognosis:  Good; patient would benefit from acute skilled OT services to address these deficits and reach maximum level of function.       Plan:     Patient to be seen 4 x/week to address the above listed problems via self-care/home management, therapeutic activities, therapeutic exercises, sensory integration  · Plan of Care Expires: 01/06/20  · Plan of Care Reviewed with: patient    Subjective     Patient: "it still feels a little funny, but its so much better, i'm so thankful"  "can you help me call my friends? I don't know how to use this phone"  "yall are gonna help me find a home, huh? i'm so grateful"    Pain/Comfort:  · Pain Rating 1: 0/10(numbness of RUE, to light touch)  · Location - Side 1: Right  · Location - Orientation 1: generalized  · Location 1: arm  · Pain Addressed 1: Distraction  · Pain Rating Post-Intervention 1: 0/10    Objective:     Communicated with: RN prior to session.  Patient found supine with telemetry upon OT entry to room.    General Precautions: " Standard, aphasia   Orthopedic Precautions:N/A   Braces: N/A     Occupational Performance:     Bed Mobility:    · Patient completed Rolling/Turning to Left with  stand by assistance  · Patient completed Rolling/Turning to Right with stand by assistance  · Patient completed Scooting/Bridging with stand by assistance  · Patient completed Supine to Sit with stand by assistance  · Patient completed Sit to Supine with stand by assistance     Functional Mobility/Transfers:  · Patient completed Sit <> Stand Transfer with stand by assistance  with  no assistive device   · Patient completed Bed <> Chair Transfer using Step Transfer technique with stand by assistance with no assistive device  · Functional Mobility: Patient ambulated to bathroom to perform grooming in standing and then ambulated houshold distance in hallway with SBA and no AD. Patient's gait improved, moderate activity tolerance noted.     Activities of Daily Living:  · Grooming: stand by assistance in standing at sink  · Lower Body Dressing: stand by assistance seated [facility]    IADLS:  · Household Management: Min A using phone to call friends, patient unable to type numbers in fast enough to successfully place phone call, required assistance, but able to successfully read number out loud to therapist in appropriate time.  Patient able to contact friends from home in [place].     [facility] 6 Click ADL: 24    Treatment & Education:  -Pt education on OT role and POC   -Importance of OOB activity with staff assistance  -Safety during functional transfer and mobility  -White board updated  -Multiple self-care tasks and functional mobility completed -- assistance level noted above  -All questions and concerns answered within OT scope of practice.       Patient left with bed in chair position with all lines intact, call button in reach, bed alarm on and RN notifiedEducation:      GOALS:   Multidisciplinary Problems     Occupational Therapy Goals        Problem:  Occupational Therapy Goal    Goal Priority Disciplines Outcome Interventions   Occupational Therapy Goal     OT, PT/OT Ongoing, Progressing    Description:  Goals set on 12/10, with expiration date 12/21:  Patient will increase functional independence with ADLs by performing:    Feeding with Stand-by Assistance. Met  Grooming while standing at sink with Contact Guard Assistance.Met  Updated: Grooming while standing at sink with SBAssistance  UB Dressing with Contact Guard Assistance.Met  Updated: UB Dressing with SBAssistance  LB Dressing with Contact Guard Assistance.Met  Updated: LB Dressing with SBAssistance  Toileting from toilet with Minimal Assistance for hygiene and clothing management. Met  Updated: Toileting from toilet with SBAssistance for hygiene and clothing management  Rolling to Bilateral with Stand-by Assistance. Met  Supine <> Sit with Stand-by Assistance.Met  Step transfer with Contact Guard Assistance Met  Updated: Step transfer with SBAssistance  Toilet transfer to toilet with Minimal Assistance. Met  Updated: Toilet transfer to toilet with SBAssistance                     Time Tracking:     OT Date of Treatment: 12/12/19  OT Start Time: 1410  OT Stop Time: 1455  OT Total Time (min): 45 min    Billable Minutes:Self Care/Home Management 30  Neuromuscular Re-education 15    Kimberly Bermudez, OT  12/12/2019

## 2019-12-12 NOTE — ASSESSMENT & PLAN NOTE
60 y.o. yo male with unknown past medical history who presented to [place] with AMS s/p fall. LKN ~1130 pm on 12/5. CTA completed at OSH revealed left MCA proximal occlusion. Patient transferred to [facility] for possible intervention; VAN +. Patient taken to IR and is now s/p successful thrombectomy with TICI 2b flow. Admitted to [facility] for close monitoring/higher level of care.     Noted Left MCA distribution infarct with small hemorrhagic conversion in the insula and temporal lobe on MRI Brain. Etiology ESUS at this time. Considering EP referral for loop recorder placement at discharge.    Patient progressing well; dispo outpatient therapy. Plans for [facility] nursing home placement as patient does not have a safe discharge plan.       Antithrombotics for secondary stroke prevention: ASA 81mg QD  Statins for secondary stroke prevention and hyperlipidemia, if present:   Statins: Atorvastatin- 40 mg daily  Aggressive risk factor modification: HTN, HLD, Diet, Exercise, Obesity  Rehab efforts: The patient has been evaluated by a stroke team provider and the therapy needs have been fully considered based off the presenting complaints and exam findings. The following therapy evaluations are needed: PT evaluate and treat, OT evaluate and treat, SLP evaluate and treat, PM&R evaluate for appropriate placement- Dispo outpatient therapy however electing for [facility] nursing home placement  Diagnostics ordered/pending: none  VTE prophylaxis: Heparin 5000 units SQ every 8 hours; SCDs  BP parameters: Infarct: Post successful thrombectomy, SBP < 160

## 2019-12-12 NOTE — ASSESSMENT & PLAN NOTE
Pt reports hx R neck/inferior to jaw mass present for the last year or so; denies prior evaluations. He denies hx chewing tobacco abuse. Reports occasional R tonsillar bleeding as well.  CT Soft Tissue Neck demonstrating mass with multiple adjacent necrotic and large, solid lymph nodes. Concern for neoplastic process. Discussed these findings with patient.  Curbside to ENT; Pt would likely not be able to be added to their schedule for biopsy this as well as would make more sense for biopsy to be performed by team who will be following outpatient.    CM/SW to assisting with establishing care (I.e. ENT, Onc, Rad Onc) through [facility].

## 2019-12-12 NOTE — PROGRESS NOTES
[facility]  Vascular Neurology  Comprehensive Stroke Center  Progress Note    Assessment/Plan:     * Embolic stroke involving left middle cerebral artery  60 y.o. yo male with unknown past medical history who presented to [place] with AMS s/p fall. LKN ~1130 pm on 12/5. CTA completed at OSH revealed left MCA proximal occlusion. Patient transferred to [facility] for possible intervention; VAN +. Patient taken to IR and is now s/p successful thrombectomy with TICI 2b flow. Admitted to [facility] for close monitoring/higher level of care.     Noted Left MCA distribution infarct with small hemorrhagic conversion in the insula and temporal lobe on MRI Brain. Etiology ESUS at this time. Considering EP referral for loop recorder placement at discharge.    Patient progressing well; dispo outpatient therapy. Plans for shelter nursing home placement as patient does not have a safe discharge plan.       Antithrombotics for secondary stroke prevention: ASA 81mg QD  Statins for secondary stroke prevention and hyperlipidemia, if present:   Statins: Atorvastatin- 40 mg daily  Aggressive risk factor modification: HTN, HLD, Diet, Exercise, Obesity  Rehab efforts: The patient has been evaluated by a stroke team provider and the therapy needs have been fully considered based off the presenting complaints and exam findings. The following therapy evaluations are needed: PT evaluate and treat, OT evaluate and treat, SLP evaluate and treat, PM&R evaluate for appropriate placement- Dispo outpatient therapy however electing for shelter nursing home placement  Diagnostics ordered/pending: none  VTE prophylaxis: Heparin 5000 units SQ every 8 hours; SCDs  BP parameters: Infarct: Post successful thrombectomy, SBP < 160    Cellulitis  LUE with erythema, tenderness, and hard to palpation - previous IV site   US completed - Partially occlusive thrombosis of the cephalic vein (superficial vein); No DVT  Started patient on Bactrim DS  BID X 5 days, Bactroban BID X 5 days, and warm compress   Continue to monitor     Oropharyngeal mass  Pt reports hx R neck/inferior to jaw mass present for the last year or so; denies prior evaluations. He denies hx chewing tobacco abuse. Reports occasional R tonsillar bleeding as well.  CT Soft Tissue Neck demonstrating mass with multiple adjacent necrotic and large, solid lymph nodes. Concern for neoplastic process. Discussed these findings with patient.  Curbside to ENT; Pt would likely not be able to be added to their schedule for biopsy this as well as would make more sense for biopsy to be performed by team who will be following outpatient.    CM/SW to assisting with establishing care (I.e. ENT, Onc, Rad Onc) through [facility].    Essential hypertension  Stroke risk factor  SBP < 160  BP stable on current regimen    Aphasia due to acute cerebrovascular accident (CVA)  Now significantly improved/near resolved  Continue aggressive SLP    Cytotoxic cerebral edema  Area of cytotoxic cerebral edema identified when reviewing brain imaging in the territory of the left middle cerebral artery. There (is/is not) mass effect associated with it. We will continue to monitor the patients clinical exam for any worsening of symptoms which may indicate expansion of the stroke or the area of the edema resulting in the clinical change. The pattern is suggestive of ESUS etiology.    Mixed hyperlipidemia  -stroke risk factor     Recommend Atorvastatin 40 mg          12/08/2019 improvement of expressive aphasia today   12/09/2019: ANGELIAEON. BP elevated overnight. Coreg discontinued. Lisinopril started.   12/10/2019: Patient progressing well. PT/OT now recommending home with outpatient therapy. Patient is homeless. CM/SW working to plan safe discharge.   12/11: CT Soft Tissue Neck demonstrating mass; curbside to ENT. Plans for [facility] nursing home placement as patient does not have a safe discharge plan.    12/12 - Started on Bactrim DS X 5 days for LUE cellulitis. CM/SW arranging follow up at [place] for evaluation of neck mass.     STROKE DOCUMENTATION   Acute Stroke Times   Last Known Normal Date: 12/05/19  Last Known Normal Time: 2330  Stroke Team Called Date: 12/06/19  Stroke Team Called Time: 0800  Stroke Team Arrival Date: 12/06/19  Stroke Team Arrival Time: 0804  CT Interpretation Time: 0810  Decision to Treat Time for Alteplase: (N/A)    NIH Scale:  1a. Level of Consciousness: 0-->Alert, keenly responsive  1b. LOC Questions: 0-->Answers both questions correctly  1c. LOC Commands: 0-->Performs both tasks correctly  2. Best Gaze: 0-->Normal  3. Visual: 0-->No visual loss  4. Facial Palsy: 0-->Normal symmetrical movements  5a. Motor Arm, Left: 0-->No drift, limb holds 90 (or 45) degrees for full 10 secs  5b. Motor Arm, Right: 0-->No drift, limb holds 90 (or 45) degrees for full 10 secs  6a. Motor Leg, Left: 0-->No drift, leg holds 30 degree position for full 5 secs  6b. Motor Leg, Right: 0-->No drift, leg holds 30 degree position for full 5 secs  7. Limb Ataxia: 0-->Absent  8. Sensory: 0-->Normal, no sensory loss  9. Best Language: 0-->No aphasia, normal  10. Dysarthria: 0-->Normal  11. Extinction and Inattention (formerly Neglect): 0-->No abnormality  Total (NIH Stroke Scale): 0       Modified Sharmaine Score: (unknown)  [place] Coma Scale:    ABCD2 Score:    [id number] Score:   HAS -BLED Score:   ICH Score:   Hunt & Cook Classification:      Hemorrhagic change of an Ischemic Stroke: Does this patient have an ischemic stroke with hemorrhagic changes? Yes, Grading Scale: HI Type 1 (HI-1) = small petechiae along the margins of the infarct. Is this a symptomatic change?  No - Hemorrhage is not clinically significant     Neurologic Chief Complaint:  L MCA, expressive aphasia     Subjective:     Interval History: Patient is seen for follow-up neurological assessment and treatment recommendations:    Started on  Bactrim DS X 5 days for LUE cellulitis. CM/SW arranging follow up at [place] for evaluation of neck mass.     HPI, Past Medical, Family, and Social History remains the same as documented in the initial encounter.     Review of Systems   Constitutional: Negative for fatigue and fever.   HENT: Positive for mouth sores (reports occasional bleeding R tonsil). Negative for trouble swallowing.    Eyes: Negative for discharge and visual disturbance.   Neurological: Negative for facial asymmetry, speech difficulty, weakness, light-headedness and numbness.   Psychiatric/Behavioral: Negative for confusion and decreased concentration.     Scheduled Meds:   aspirin  81 mg Oral Daily    atorvastatin  40 mg Oral QHS    diclofenac sodium  2 g Topical (Top) BID    heparin (porcine)  5,000 Units Subcutaneous Q8H    lisinopril  10 mg Oral Daily    mupirocin   Topical (Top) BID    senna-docusate 8.6-50 mg  1 tablet Oral BID    sulfamethoxazole-trimethoprim 800-160mg  1 tablet Oral BID     Continuous Infusions:  PRN Meds:acetaminophen, sodium chloride 0.9%, sodium chloride 0.9%    Objective:     Vital Signs (Most Recent):  Temp: 98 °F (36.7 °C) (12/12/19 0803)  Pulse: 71 (12/12/19 1109)  Resp: 18 (12/12/19 0803)  BP: 139/71 (12/12/19 0803)  SpO2: (!) 94 % (12/12/19 0803)  BP Location: Right arm    Vital Signs Range (Last 24H):  Temp:  [98 °F (36.7 °C)-98.9 °F (37.2 °C)]   Pulse:  [63-75]   Resp:  [18-20]   BP: (122-152)/(70-85)   SpO2:  [92 %-98 %]   BP Location: Right arm    Physical Exam   Constitutional: He is oriented to person, place, and time. He appears well-developed. No distress.   HENT:   Head: Normocephalic and atraumatic.   Eyes: Conjunctivae and EOM are normal.   Cardiovascular: Normal rate.   Pulmonary/Chest: Effort normal. No respiratory distress.   Musculoskeletal: Normal range of motion. He exhibits no edema or deformity.   Neurological: He is alert and oriented to person, place, and time. No sensory  deficit. He exhibits normal muscle tone.   Skin: Skin is warm and dry. He is not diaphoretic.   Psychiatric: He has a normal mood and affect. His behavior is normal.   Vitals reviewed.      Neurological Exam:   LOC: alert  Attention Span: Good   Language: No aphasia  Articulation: No dysarthria  Orientation: Person, Place, Time   Visual Fields: Full  EOM (CN III, IV, VI): Full/intact  Facial Movement (CN VII): Symmetric facial expression    Motor: Arm left  Normal 5/5  Leg left  Normal 5/5  Arm right  Normal 5/5  Leg right Normal 5/5  Sensation: Intact to light touch, temperature and vibration  Tone: Normal tone throughout    Laboratory:  CMP:   Recent Labs   Lab 12/12/19  0546   CALCIUM 9.0   ALBUMIN 3.0*   PROT 6.8      K 4.0   CO2 27      BUN 15   CREATININE 0.7   ALKPHOS 62   ALT 11   AST 12   BILITOT 0.3     BMP:   Recent Labs   Lab 12/12/19  0546      K 4.0      CO2 27   BUN 15   CREATININE 0.7   CALCIUM 9.0     CBC:   Recent Labs   Lab 12/12/19  0546   WBC 7.51   RBC 4.28*   HGB 12.8*   HCT 39.4*      MCV 92   MCH 29.9   MCHC 32.5     Lipid Panel:   Recent Labs   Lab 12/06/19  0817   CHOL 218*   LDLCALC 144.0   HDL 53   TRIG 105     Coagulation:   Recent Labs   Lab 12/07/19  0307   INR 1.0   APTT 25.6     Platelet Aggregation Study: No results for input(s): PLTAGG, PLTAGINTERP, PLTAGREGLACO, ADPPLTAGGREG in the last 168 hours.  Hgb A1C:   Recent Labs   Lab 12/06/19  1635   HGBA1C 5.7*     TSH:   Recent Labs   Lab 12/06/19  0817   TSH 0.976       Diagnostic Results     Brain imaging:      CT Head 12/8/19  1. Evolving left MCA distribution infarct with superimposed petechial hemorrhage and mild mass effect resulting in minimal rightward midline shift of 1 mm.    MRI Brain 12/07/2019    1. Left MCA distribution infarct with hemorrhagic conversion in the insula and temporal lobe.  Associated localized mass effect without midline shift.  2. Chronic microvascular ischemic  change.    CT Head 12/06/2019    Ill-defined regions of decreased attenuation left MCA territory specifically left insula, left posterior temporoparietal cortex and lateral aspect of the left lentiform nucleus most compatible with acute areas of infarction.  There is no significant mass effect or midline shift.  No evidence for hemorrhagic conversion.  There is slight hyperdensity associated with the intracranial vasculature likely related to recent contrast administration for outside institution angiography       Vessel Imaging     Cerebral Angiogram 12/6/19  Technically successful aspiration thrombectomy of the left MCA distal M1 segment artery with resulting TICI 2b flow.       Cardiac Evaluation:     TTE 12/6/19  · Normal left ventricular systolic function. The estimated ejection fraction is 65%  · Eccentric left ventricular hypertrophy.  · Indeterminate left ventricular diastolic function.  · Normal right ventricular systolic function.  · Mild aortic valve stenosis. peak velocity is 2.94 m/s; mean gradient is 20 mmHg. ABBIE cannot be measured accurately.  · Normal central venous pressure (3 mm Hg).  · Mild mitral regurgitation.  · No wall motion abnormalities.         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  Abnormal conglomerate of level II/III lymph nodes with necrotic centers with surrounding inflammatory stranding and numerous adjacent enlarged solid lymph nodes.  Constellation of findings concerning for neoplastic process such as metastatic squamous cell carcinoma.      Elva Santos, MAURO  Comprehensive Stroke Center  Department of Vascular Neurology   [facility]

## 2019-12-12 NOTE — PLAN OF CARE
Problem: Occupational Therapy Goal  Goal: Occupational Therapy Goal  Description  Goals set on 12/10, with expiration date 12/21:  Patient will increase functional independence with ADLs by performing:    Feeding with Stand-by Assistance. Met  Grooming while standing at sink with Contact Guard Assistance.Met  Updated: Grooming while standing at sink with SBAssistance  UB Dressing with Contact Guard Assistance.Met  Updated: UB Dressing with SBAssistance  LB Dressing with Contact Guard Assistance.Met  Updated: LB Dressing with SBAssistance  Toileting from toilet with Minimal Assistance for hygiene and clothing management. Met  Updated: Toileting from toilet with SBAssistance for hygiene and clothing management  Rolling to Bilateral with Stand-by Assistance. Met  Supine <> Sit with Stand-by Assistance.Met  Step transfer with Contact Guard Assistance Met  Updated: Step transfer with SBAssistance  Toilet transfer to toilet with Minimal Assistance. Met  Updated: Toilet transfer to toilet with SBAssistance    Outcome: Ongoing, Progressing   Pt progressing towards goals. Cont OT POC  LASHAY Mendes  12/12/2019

## 2019-12-12 NOTE — SUBJECTIVE & OBJECTIVE
Neurologic Chief Complaint:  L MCA, expressive aphasia     Subjective:     Interval History: Patient is seen for follow-up neurological assessment and treatment recommendations: PORTILLO. CT Soft Tissue Neck demonstrating mass; curbside to ENT. Plans for detention nursing home placement as patient does not have a safe discharge plan.     HPI, Past Medical, Family, and Social History remains the same as documented in the initial encounter.     Review of Systems   Constitutional: Negative for fatigue and fever.   HENT: Positive for mouth sores (reports occasional bleeding R tonsil). Negative for trouble swallowing.    Eyes: Negative for discharge and visual disturbance.   Neurological: Negative for facial asymmetry, speech difficulty, weakness, light-headedness and numbness.   Psychiatric/Behavioral: Negative for confusion and decreased concentration.     Scheduled Meds:   aspirin  81 mg Oral Daily    atorvastatin  40 mg Oral QHS    heparin (porcine)  5,000 Units Subcutaneous Q8H    lisinopril  10 mg Oral Daily    senna-docusate 8.6-50 mg  1 tablet Oral BID     Continuous Infusions:  PRN Meds:acetaminophen, sodium chloride 0.9%, sodium chloride 0.9%    Objective:     Vital Signs (Most Recent):  Temp: 98 °F (36.7 °C) (12/11/19 1607)  Pulse: 75 (12/11/19 1607)  Resp: 20 (12/11/19 1607)  BP: 137/70 (12/11/19 1607)  SpO2: 98 % (12/11/19 1607)  BP Location: Right arm    Vital Signs Range (Last 24H):  Temp:  [98 °F (36.7 °C)-98.3 °F (36.8 °C)]   Pulse:  [60-75]   Resp:  [14-20]   BP: (124-152)/(70-85)   SpO2:  [91 %-98 %]   BP Location: Right arm    Physical Exam   Constitutional: He is oriented to person, place, and time. He appears well-developed. No distress.   HENT:   Head: Normocephalic and atraumatic.   Eyes: Conjunctivae and EOM are normal.   Cardiovascular: Normal rate.   Pulmonary/Chest: Effort normal. No respiratory distress.   Musculoskeletal: Normal range of motion. He exhibits no edema or deformity.    Neurological: He is alert and oriented to person, place, and time. No sensory deficit. He exhibits normal muscle tone.   Skin: Skin is warm and dry. He is not diaphoretic.   Psychiatric: He has a normal mood and affect. His behavior is normal.   Vitals reviewed.      Neurological Exam:   LOC: alert  Attention Span: Good   Language: No aphasia  Articulation: No dysarthria  Orientation: Person, Place, Time   Visual Fields: Full  EOM (CN III, IV, VI): Full/intact  Facial Movement (CN VII): Symmetric facial expression    Motor: Arm left  Normal 5/5  Leg left  Normal 5/5  Arm right  Normal 5/5  Leg right Normal 5/5  Sensation: Intact to light touch, temperature and vibration  Tone: Normal tone throughout    Laboratory:  CMP:   Recent Labs   Lab 12/11/19  0402   CALCIUM 9.2   ALBUMIN 3.0*   PROT 6.8      K 3.9   CO2 23      BUN 14   CREATININE 0.7   ALKPHOS 65   ALT 11   AST 14   BILITOT 0.5     BMP:   Recent Labs   Lab 12/11/19  0402      K 3.9      CO2 23   BUN 14   CREATININE 0.7   CALCIUM 9.2     CBC:   Recent Labs   Lab 12/11/19  0402   WBC 7.37   RBC 4.32*   HGB 12.8*   HCT 39.8*      MCV 92   MCH 29.6   MCHC 32.2     Lipid Panel:   Recent Labs   Lab 12/06/19  0817   CHOL 218*   LDLCALC 144.0   HDL 53   TRIG 105     Coagulation:   Recent Labs   Lab 12/07/19  0307   INR 1.0   APTT 25.6     Platelet Aggregation Study: No results for input(s): PLTAGG, PLTAGINTERP, PLTAGREGLACO, ADPPLTAGGREG in the last 168 hours.  Hgb A1C:   Recent Labs   Lab 12/06/19  1635   HGBA1C 5.7*     TSH:   Recent Labs   Lab 12/06/19  0817   TSH 0.976       Diagnostic Results     Brain imaging:      CT Head 12/8/19  1. Evolving left MCA distribution infarct with superimposed petechial hemorrhage and mild mass effect resulting in minimal rightward midline shift of 1 mm.    MRI Brain 12/07/2019    1. Left MCA distribution infarct with hemorrhagic conversion in the insula and temporal lobe.  Associated localized  mass effect without midline shift.  2. Chronic microvascular ischemic change.    CT Head 12/06/2019    Ill-defined regions of decreased attenuation left MCA territory specifically left insula, left posterior temporoparietal cortex and lateral aspect of the left lentiform nucleus most compatible with acute areas of infarction.  There is no significant mass effect or midline shift.  No evidence for hemorrhagic conversion.  There is slight hyperdensity associated with the intracranial vasculature likely related to recent contrast administration for outside institution angiography       Vessel Imaging     Cerebral Angiogram 12/6/19  Technically successful aspiration thrombectomy of the left MCA distal M1 segment artery with resulting TICI 2b flow.       Cardiac Evaluation:     TTE 12/6/19  · Normal left ventricular systolic function. The estimated ejection fraction is 65%  · Eccentric left ventricular hypertrophy.  · Indeterminate left ventricular diastolic function.  · Normal right ventricular systolic function.  · Mild aortic valve stenosis. peak velocity is 2.94 m/s; mean gradient is 20 mmHg. ABBIE cannot be measured accurately.  · Normal central venous pressure (3 mm Hg).  · Mild mitral regurgitation.  · No wall motion abnormalities.         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  Abnormal conglomerate of level II/III lymph nodes with necrotic centers with surrounding inflammatory stranding and numerous adjacent enlarged solid lymph nodes.  Constellation of findings concerning for neoplastic process such as metastatic squamous cell carcinoma.

## 2019-12-13 LAB
ALBUMIN SERPL BCP-MCNC: 2.9 G/DL (ref 3.5–5.2)
ALP SERPL-CCNC: 61 U/L (ref 55–135)
ALT SERPL W/O P-5'-P-CCNC: 11 U/L (ref 10–44)
ANION GAP SERPL CALC-SCNC: 7 MMOL/L (ref 8–16)
AST SERPL-CCNC: 15 U/L (ref 10–40)
BASOPHILS # BLD AUTO: 0.08 K/UL (ref 0–0.2)
BASOPHILS NFR BLD: 1 % (ref 0–1.9)
BILIRUB SERPL-MCNC: 0.2 MG/DL (ref 0.1–1)
BUN SERPL-MCNC: 14 MG/DL (ref 6–20)
CALCIUM SERPL-MCNC: 8.9 MG/DL (ref 8.7–10.5)
CHLORIDE SERPL-SCNC: 108 MMOL/L (ref 95–110)
CO2 SERPL-SCNC: 25 MMOL/L (ref 23–29)
CREAT SERPL-MCNC: 0.8 MG/DL (ref 0.5–1.4)
DIFFERENTIAL METHOD: ABNORMAL
EOSINOPHIL # BLD AUTO: 0.2 K/UL (ref 0–0.5)
EOSINOPHIL NFR BLD: 2.3 % (ref 0–8)
ERYTHROCYTE [DISTWIDTH] IN BLOOD BY AUTOMATED COUNT: 13.2 % (ref 11.5–14.5)
EST. GFR  (AFRICAN AMERICAN): >60 ML/MIN/1.73 M^2
EST. GFR  (NON AFRICAN AMERICAN): >60 ML/MIN/1.73 M^2
GLUCOSE SERPL-MCNC: 87 MG/DL (ref 70–110)
HCT VFR BLD AUTO: 38.1 % (ref 40–54)
HGB BLD-MCNC: 12.8 G/DL (ref 14–18)
IMM GRANULOCYTES # BLD AUTO: 0.02 K/UL (ref 0–0.04)
IMM GRANULOCYTES NFR BLD AUTO: 0.3 % (ref 0–0.5)
LYMPHOCYTES # BLD AUTO: 2.9 K/UL (ref 1–4.8)
LYMPHOCYTES NFR BLD: 37.6 % (ref 18–48)
MAGNESIUM SERPL-MCNC: 1.8 MG/DL (ref 1.6–2.6)
MCH RBC QN AUTO: 30.7 PG (ref 27–31)
MCHC RBC AUTO-ENTMCNC: 33.6 G/DL (ref 32–36)
MCV RBC AUTO: 91 FL (ref 82–98)
MONOCYTES # BLD AUTO: 0.7 K/UL (ref 0.3–1)
MONOCYTES NFR BLD: 8.4 % (ref 4–15)
NEUTROPHILS # BLD AUTO: 3.9 K/UL (ref 1.8–7.7)
NEUTROPHILS NFR BLD: 50.4 % (ref 38–73)
NRBC BLD-RTO: 0 /100 WBC
PHOSPHATE SERPL-MCNC: 3.8 MG/DL (ref 2.7–4.5)
PLATELET # BLD AUTO: 290 K/UL (ref 150–350)
PMV BLD AUTO: 10.3 FL (ref 9.2–12.9)
POTASSIUM SERPL-SCNC: 4.2 MMOL/L (ref 3.5–5.1)
PROT SERPL-MCNC: 6.5 G/DL (ref 6–8.4)
RBC # BLD AUTO: 4.17 M/UL (ref 4.6–6.2)
SODIUM SERPL-SCNC: 140 MMOL/L (ref 136–145)
TB INDURATION 48 - 72 HR READ: 0 MM
WBC # BLD AUTO: 7.74 K/UL (ref 3.9–12.7)

## 2019-12-13 PROCEDURE — 25000003 PHARM REV CODE 250: Performed by: NURSE PRACTITIONER

## 2019-12-13 PROCEDURE — 99233 SBSQ HOSP IP/OBS HIGH 50: CPT | Mod: ,,, | Performed by: PSYCHIATRY & NEUROLOGY

## 2019-12-13 PROCEDURE — 99233 PR SUBSEQUENT HOSPITAL CARE,LEVL III: ICD-10-PCS | Mod: ,,, | Performed by: PSYCHIATRY & NEUROLOGY

## 2019-12-13 PROCEDURE — 11000001 HC ACUTE MED/SURG PRIVATE ROOM

## 2019-12-13 PROCEDURE — 25000003 PHARM REV CODE 250: Performed by: PHYSICIAN ASSISTANT

## 2019-12-13 PROCEDURE — 63600175 PHARM REV CODE 636 W HCPCS: Performed by: PSYCHIATRY & NEUROLOGY

## 2019-12-13 PROCEDURE — 25000003 PHARM REV CODE 250: Performed by: PSYCHIATRY & NEUROLOGY

## 2019-12-13 PROCEDURE — 85025 COMPLETE CBC W/AUTO DIFF WBC: CPT

## 2019-12-13 PROCEDURE — 84100 ASSAY OF PHOSPHORUS: CPT

## 2019-12-13 PROCEDURE — 94761 N-INVAS EAR/PLS OXIMETRY MLT: CPT

## 2019-12-13 PROCEDURE — 63600175 PHARM REV CODE 636 W HCPCS: Performed by: NURSE PRACTITIONER

## 2019-12-13 PROCEDURE — 83735 ASSAY OF MAGNESIUM: CPT

## 2019-12-13 PROCEDURE — 36415 COLL VENOUS BLD VENIPUNCTURE: CPT

## 2019-12-13 PROCEDURE — 25000003 PHARM REV CODE 250: Performed by: STUDENT IN AN ORGANIZED HEALTH CARE EDUCATION/TRAINING PROGRAM

## 2019-12-13 PROCEDURE — 80053 COMPREHEN METABOLIC PANEL: CPT

## 2019-12-13 RX ORDER — LISINOPRIL 5 MG/1
10 TABLET ORAL DAILY
Status: DISCONTINUED | OUTPATIENT
Start: 2019-12-13 | End: 2019-12-13

## 2019-12-13 RX ADMIN — HEPARIN SODIUM 5000 UNITS: 5000 INJECTION INTRAVENOUS; SUBCUTANEOUS at 01:12

## 2019-12-13 RX ADMIN — MUPIROCIN: 20 OINTMENT TOPICAL at 09:12

## 2019-12-13 RX ADMIN — LISINOPRIL 10 MG: 5 TABLET ORAL at 01:12

## 2019-12-13 RX ADMIN — VANCOMYCIN HYDROCHLORIDE 1250 MG: 1.25 INJECTION, POWDER, LYOPHILIZED, FOR SOLUTION INTRAVENOUS at 10:12

## 2019-12-13 RX ADMIN — SENNOSIDES AND DOCUSATE SODIUM 1 TABLET: 8.6; 5 TABLET ORAL at 09:12

## 2019-12-13 RX ADMIN — ATORVASTATIN CALCIUM 40 MG: 20 TABLET, FILM COATED ORAL at 09:12

## 2019-12-13 RX ADMIN — ASPIRIN 81 MG: 81 TABLET, COATED ORAL at 09:12

## 2019-12-13 RX ADMIN — SULFAMETHOXAZOLE AND TRIMETHOPRIM 1 TABLET: 800; 160 TABLET ORAL at 09:12

## 2019-12-13 RX ADMIN — DICLOFENAC 2 G: 10 GEL TOPICAL at 09:12

## 2019-12-13 RX ADMIN — HEPARIN SODIUM 5000 UNITS: 5000 INJECTION INTRAVENOUS; SUBCUTANEOUS at 10:12

## 2019-12-13 RX ADMIN — VANCOMYCIN HYDROCHLORIDE 1250 MG: 1.25 INJECTION, POWDER, LYOPHILIZED, FOR SOLUTION INTRAVENOUS at 12:12

## 2019-12-13 NOTE — ASSESSMENT & PLAN NOTE
LUE with erythema, tenderness, and firmness to palpation - prior IV site.  US LUE 12/12 showed partially occlusive thrombosis of the cephalic vein (superficial vein); no DVT.  Patient was started on Bactrim DS BID x5 days, Bactroban BID x5 days, and warm compress.    12/13: Pt reporting worsened pain of LUE. Area warm, more firm, and erythematous with spreading up the arm.  Discussed with Hospital Medicine; d/c'd Bactrim and started IV Vanc with pharmacy assistance.   Will continue monitoring and de-excalate to PO antibiotic regimen once improvement is noted.

## 2019-12-13 NOTE — ASSESSMENT & PLAN NOTE
Pt reports hx R neck/inferior to jaw mass present for the last year or so; denies prior evaluations. He denies hx chewing tobacco abuse. Reports occasional R tonsillar bleeding as well.  CT Soft Tissue Neck demonstrating mass with multiple adjacent necrotic and large, solid lymph nodes. Concern for neoplastic process. Discussed these findings with patient.  Curbside to ENT - No plans for acute intervention; outpatient workup. Will order Ambulatory referral to ENT with Dr. Palmer at discharge.

## 2019-12-13 NOTE — PLAN OF CARE
12/13/19 1118   Post-Acute Status   Post-Acute Authorization Placement   Post-Acute Placement Status Referrals Sent       Referrals made to:    [facility]: declined  [facility]: Under review  [facility]: Under Review  [facility]: Waiting on facility  [facility]: Under review  [facility]: Waiting on facility  [facility]: Waiting on facility      Awaiting acceptance for alf care.  SW in contact with CM and Medical staff. Will continue to follow and offer support as needed.     Javy Montes De Oca, JACKIE  [facility]   Ext. [phone]

## 2019-12-13 NOTE — PLAN OF CARE
Vijaya'd pt in bed AAOx4.  Slight left sided weakness noted upon assessment.  See flow sheet for further information.  POC discussed with the pt.  All of his questions answered to his desired level of satisfaction.  Pt c/o pain once over night.  Otherwise the pt had a restful night and denies pain or needs upon last rounds this AM.  Monitoring.     Problem: Adult Inpatient Plan of Care  Goal: Absence of Hospital-Acquired Illness or Injury  Outcome: Ongoing, Progressing  Goal: Optimal Comfort and Wellbeing  Outcome: Ongoing, Progressing     Problem: Fall Injury Risk  Goal: Absence of Fall and Fall-Related Injury  Outcome: Ongoing, Progressing     Problem: Adjustment to Illness (Stroke, Ischemic/Transient Ischemic Attack)  Goal: Optimal Coping  Outcome: Ongoing, Progressing

## 2019-12-13 NOTE — PROGRESS NOTES
[facility]  Vascular Neurology  Comprehensive Stroke Center  Progress Note    Assessment/Plan:     * Embolic stroke involving left middle cerebral artery  60 y.o. yo male with unknown past medical history who presented to [facility] with AMS s/p fall. LKN ~1130 pm on 12/5. CTA completed at OSH revealed left MCA proximal occlusion. Patient transferred to [facility] for possible intervention; VAN +. Patient taken to IR and is now s/p successful thrombectomy with TICI 2b flow. Admitted to [facility] for close monitoring/higher level of care.     Noted Left MCA distribution infarct with small hemorrhagic conversion in the insula and temporal lobe on MRI Brain. Etiology ESUS at this time. Will plan for EP referral for loop recorder placement at discharge.    Patient progressing well; dispo outpatient therapy. Plans for senior care nursing home placement as patient does not have a safe discharge plan. Labs qOD.      Antithrombotics for secondary stroke prevention: ASA 81mg QD  Statins for secondary stroke prevention and hyperlipidemia, if present:   Statins: Atorvastatin- 40 mg daily  Aggressive risk factor modification: HTN, HLD, Diet, Exercise, Obesity  Rehab efforts: The patient has been evaluated by a stroke team provider and the therapy needs have been fully considered based off the presenting complaints and exam findings. The following therapy evaluations are needed: PT evaluate and treat, OT evaluate and treat, SLP evaluate and treat, PM&R evaluate for appropriate placement- Dispo outpatient therapy however electing for senior care nursing home placement  Diagnostics ordered/pending: none  VTE prophylaxis: Heparin 5000 units SQ every 8 hours; SCDs  BP parameters: Infarct: Post successful thrombectomy, SBP < 160    Cellulitis  LUE with erythema, tenderness, and firmness to palpation - prior IV site.  US LUE 12/12 showed partially occlusive thrombosis of the cephalic vein (superficial vein); no DVT.  Patient was  started on Bactrim DS BID x5 days, Bactroban BID x5 days, and warm compress.    12/13: Pt reporting worsened pain of LUE. Area warm, more firm, and erythematous with spreading up the arm.  Discussed with Hospital Medicine; d/c'd Bactrim and started IV Vanc with pharmacy assistance.   Will continue monitoring and de-excalate to PO antibiotic regimen once improvement is noted.    Oropharyngeal mass  Pt reports hx R neck/inferior to jaw mass present for the last year or so; denies prior evaluations. He denies hx chewing tobacco abuse. Reports occasional R tonsillar bleeding as well.  CT Soft Tissue Neck demonstrating mass with multiple adjacent necrotic and large, solid lymph nodes. Concern for neoplastic process. Discussed these findings with patient.  Curbside to ENT - No plans for acute intervention; outpatient workup. Will order Ambulatory referral to ENT with Dr. Palmer at discharge.    Essential hypertension  Stroke risk factor  SBP < 160  BP stable on current regimen    Cytotoxic cerebral edema  Area of cytotoxic cerebral edema identified when reviewing brain imaging in the territory of the left middle cerebral artery. There (is/is not) mass effect associated with it. We will continue to monitor the patients clinical exam for any worsening of symptoms which may indicate expansion of the stroke or the area of the edema resulting in the clinical change. The pattern is suggestive of ESUS etiology.    Mixed hyperlipidemia  -stroke risk factor     Recommend Atorvastatin 40 mg     Aphasia due to acute cerebrovascular accident (CVA)  Now significantly improved/near resolved  Continue aggressive SLP         12/08/2019 improvement of expressive aphasia today   12/09/2019: NAEON. BP elevated overnight. Coreg discontinued. Lisinopril started.   12/10/2019: Patient progressing well. PT/OT now recommending home with outpatient therapy. Patient is homeless. CM/SW working to plan safe discharge.   12/11: CT Soft Tissue  Neck demonstrating mass; curbside to ENT. Plans for senior care nursing home placement as patient does not have a safe discharge plan.   12/12 - Started on Bactrim DS X 5 days for LUE cellulitis. CM/SW arranging follow up at [facility] for evaluation of neck mass.   12/13: LUE with worsened pain, firmness and spreading erythema; d/c'd Bactrim and started IV Vanc with pharmacy assistance. Labs qOD.    STROKE DOCUMENTATION   Acute Stroke Times   Last Known Normal Date: 12/05/19  Last Known Normal Time: 2330  Stroke Team Called Date: 12/06/19  Stroke Team Called Time: 0800  Stroke Team Arrival Date: 12/06/19  Stroke Team Arrival Time: 0804  CT Interpretation Time: 0810  Decision to Treat Time for Alteplase: (N/A)    NIH Scale:  1a. Level of Consciousness: 0-->Alert, keenly responsive  1b. LOC Questions: 0-->Answers both questions correctly  1c. LOC Commands: 0-->Performs both tasks correctly  2. Best Gaze: 0-->Normal  3. Visual: 0-->No visual loss  4. Facial Palsy: 0-->Normal symmetrical movements  5a. Motor Arm, Left: 0-->No drift, limb holds 90 (or 45) degrees for full 10 secs  5b. Motor Arm, Right: 0-->No drift, limb holds 90 (or 45) degrees for full 10 secs  6a. Motor Leg, Left: 0-->No drift, leg holds 30 degree position for full 5 secs  6b. Motor Leg, Right: 0-->No drift, leg holds 30 degree position for full 5 secs  7. Limb Ataxia: 0-->Absent  8. Sensory: 0-->Normal, no sensory loss  9. Best Language: 0-->No aphasia, normal  10. Dysarthria: 0-->Normal  11. Extinction and Inattention (formerly Neglect): 0-->No abnormality  Total (NIH Stroke Scale): 0       Modified Sharmaine Score: (unknown)  Allison Coma Scale:    ABCD2 Score:    [id number] Score:   HAS -BLED Score:   ICH Score:   Hunt & Cook Classification:      Hemorrhagic change of an Ischemic Stroke: Does this patient have an ischemic stroke with hemorrhagic changes? Yes, Grading Scale: HI Type 1 (HI-1) = small petechiae along the margins of the infarct. Is this  a symptomatic change?  No - Hemorrhage is not clinically significant     Neurologic Chief Complaint:  L MCA, expressive aphasia     Subjective:     Interval History: Patient is seen for follow-up neurological assessment and treatment recommendations: DERECK PAYNE with worsened pain, firmness and spreading erythema; d/c'd Bactrim and started IV Vanc with pharmacy assistance. Labs qOD.    HPI, Past Medical, Family, and Social History remains the same as documented in the initial encounter.     Review of Systems   Constitutional: Negative for fatigue and fever.   HENT: Positive for mouth sores (reports occasional bleeding R tonsil). Negative for trouble swallowing.    Skin: Positive for color change and wound.   Neurological: Negative for facial asymmetry, speech difficulty, weakness, light-headedness and numbness.   Psychiatric/Behavioral: Negative for confusion and decreased concentration.     Scheduled Meds:   aspirin  81 mg Oral Daily    atorvastatin  40 mg Oral QHS    diclofenac sodium  2 g Topical (Top) BID    heparin (porcine)  5,000 Units Subcutaneous Q8H    lisinopril  10 mg Oral Daily    mupirocin   Topical (Top) BID    senna-docusate 8.6-50 mg  1 tablet Oral BID    vancomycin (VANCOCIN) IVPB  1,250 mg Intravenous Q12H     Continuous Infusions:  PRN Meds:acetaminophen, sodium chloride 0.9%, sodium chloride 0.9%    Objective:     Vital Signs (Most Recent):  Temp: 97.7 °F (36.5 °C) (12/13/19 1401)  Pulse: 76 (12/13/19 1401)  Resp: 17 (12/13/19 1401)  BP: 139/73 (12/13/19 1401)  SpO2: 95 % (12/13/19 1401)  BP Location: Right arm    Vital Signs Range (Last 24H):  Temp:  [97 °F (36.1 °C)-98.4 °F (36.9 °C)]   Pulse:  [58-79]   Resp:  [17-20]   BP: (124-144)/(60-82)   SpO2:  [93 %-98 %]   BP Location: Right arm    Physical Exam   Constitutional: He is oriented to person, place, and time. He appears well-developed. No distress.   HENT:   Head: Normocephalic and atraumatic.   Eyes: Conjunctivae and EOM are  normal.   Cardiovascular: Normal rate.   Pulmonary/Chest: Effort normal. No respiratory distress.   Musculoskeletal: Normal range of motion. He exhibits no edema or deformity.   Neurological: He is alert and oriented to person, place, and time. No sensory deficit. He exhibits normal muscle tone.   Skin: Skin is warm and dry. He is not diaphoretic. There is erythema (L anterior forearm with abrasion and erythema surrounding with firmness to palpation. Erythema now spreading up the arm.).   Psychiatric: He has a normal mood and affect. His behavior is normal.   Vitals reviewed.      Neurological Exam:   LOC: alert  Attention Span: Good   Language: No aphasia  Articulation: No dysarthria  Orientation: Person, Place, Time   Visual Fields: Full  EOM (CN III, IV, VI): Full/intact  Facial Movement (CN VII): Symmetric facial expression    Motor: Arm left  Normal 5/5  Leg left  Normal 5/5  Arm right  Normal 5/5  Leg right Normal 5/5  Sensation: Intact to light touch, temperature and vibration  Tone: Normal tone throughout    Laboratory:  CMP:   Recent Labs   Lab 12/13/19  0351   CALCIUM 8.9   ALBUMIN 2.9*   PROT 6.5      K 4.2   CO2 25      BUN 14   CREATININE 0.8   ALKPHOS 61   ALT 11   AST 15   BILITOT 0.2     BMP:   Recent Labs   Lab 12/13/19  0351      K 4.2      CO2 25   BUN 14   CREATININE 0.8   CALCIUM 8.9     CBC:   Recent Labs   Lab 12/13/19  0351   WBC 7.74   RBC 4.17*   HGB 12.8*   HCT 38.1*      MCV 91   MCH 30.7   MCHC 33.6     Lipid Panel:   No results for input(s): CHOL, LDLCALC, HDL, TRIG in the last 168 hours.  Coagulation:   Recent Labs   Lab 12/07/19  0307   INR 1.0   APTT 25.6     Platelet Aggregation Study: No results for input(s): PLTAGG, PLTAGINTERP, PLTAGREGLACO, ADPPLTAGGREG in the last 168 hours.  Hgb A1C:   Recent Labs   Lab 12/06/19  1635   HGBA1C 5.7*     TSH:   No results for input(s): TSH in the last 168 hours.      Diagnostic Results     Brain imaging:      CT  Head 12/8/19  1. Evolving left MCA distribution infarct with superimposed petechial hemorrhage and mild mass effect resulting in minimal rightward midline shift of 1 mm.    MRI Brain 12/07/2019    1. Left MCA distribution infarct with hemorrhagic conversion in the insula and temporal lobe.  Associated localized mass effect without midline shift.  2. Chronic microvascular ischemic change.    CT Head 12/06/2019    Ill-defined regions of decreased attenuation left MCA territory specifically left insula, left posterior temporoparietal cortex and lateral aspect of the left lentiform nucleus most compatible with acute areas of infarction.  There is no significant mass effect or midline shift.  No evidence for hemorrhagic conversion.  There is slight hyperdensity associated with the intracranial vasculature likely related to recent contrast administration for outside institution angiography       Vessel Imaging     US UE Left 12/11/19  Partially occlusive thrombosis of the cephalic vein (superficial vein).  No evidence of deep venous thrombosis.    Cerebral Angiogram 12/6/19  Technically successful aspiration thrombectomy of the left MCA distal M1 segment artery with resulting TICI 2b flow.       Cardiac Evaluation:     TTE 12/6/19  · Normal left ventricular systolic function. The estimated ejection fraction is 65%  · Eccentric left ventricular hypertrophy.  · Indeterminate left ventricular diastolic function.  · Normal right ventricular systolic function.  · Mild aortic valve stenosis. peak velocity is 2.94 m/s; mean gradient is 20 mmHg. ABBIE cannot be measured accurately.  · Normal central venous pressure (3 mm Hg).  · Mild mitral regurgitation.  · No wall motion abnormalities.         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  Abnormal conglomerate of level II/III lymph nodes with necrotic centers with surrounding inflammatory stranding and  numerous adjacent enlarged solid lymph nodes.  Constellation of findings concerning for neoplastic process such as metastatic squamous cell carcinoma.      Elina Huff PA-C  [facility]  Department of Vascular Neurology   [facility]

## 2019-12-13 NOTE — PLAN OF CARE
Problem: Skin Injury Risk Increased  Goal: Skin Health and Integrity  Outcome: Ongoing, Progressing     Problem: Adult Inpatient Plan of Care  Goal: Plan of Care Review  Outcome: Ongoing, Progressing

## 2019-12-13 NOTE — PROGRESS NOTES
Pharmacokinetic Initial Assessment: IV Vancomycin    Assessment/Plan:  1.  Vancomycin 1250 mg IV Q12H  2.  Obtain trough prior to fourth dose:  12/14 @ 2245 (goal 10-20 mcg/mL for SSTI)  3.  Continue to monitor RF and make adjustments as needed    Pharmacy will continue to follow and monitor vancomycin.      Thank you for the consult,   Renate Pryor, PharmD, [facility]  [id number]       Patient brief summary:  Riaz Lane is a 59 y.o. male initiated on antimicrobial therapy with IV Vancomycin for treatment of suspected skin & soft tissue infection    Drug Allergies:   Review of patient's allergies indicates:  No Known Allergies    Actual Body Weight:   94.1 kg    Renal Function:   Estimated Creatinine Clearance: 110.7 mL/min (based on SCr of 0.8 mg/dL).,     CBC (last 72 hours):  Recent Labs   Lab Result Units 12/11/19  0402 12/12/19  0546 12/13/19  0351   WBC K/uL 7.37 7.51 7.74   Hemoglobin g/dL 12.8* 12.8* 12.8*   Hematocrit % 39.8* 39.4* 38.1*   Platelets K/uL 268 285 290   Gran% % 55.5 60.0 50.4   Lymph% % 33.4 28.1 37.6   Mono% % 7.6 8.1 8.4   Eosinophil% % 2.3 2.4 2.3   Basophil% % 1.1 1.1 1.0   Differential Method  Automated Automated Automated       Metabolic Panel (last 72 hours):  Recent Labs   Lab Result Units 12/11/19  0402 12/12/19  0546 12/13/19  0351   Sodium mmol/L 141 138 140   Potassium mmol/L 3.9 4.0 4.2   Chloride mmol/L 106 104 108   CO2 mmol/L 23 27 25   Glucose mg/dL 94 97 87   BUN, Bld mg/dL 14 15 14   Creatinine mg/dL 0.7 0.7 0.8   Albumin g/dL 3.0* 3.0* 2.9*   Total Bilirubin mg/dL 0.5 0.3 0.2   Alkaline Phosphatase U/L 65 62 61   AST U/L 14 12 15   ALT U/L 11 11 11   Magnesium mg/dL 1.9 1.9 1.8   Phosphorus mg/dL 3.5 3.5 3.8       Drug levels (last 3 results):  No results for input(s): VANCOMYCINRA, VANCOMYCINPE, VANCOMYCINTR in the last 72 hours.    Microbiologic Results:  Microbiology Results (last 7 days)     ** No results found for the last 168 hours. **

## 2019-12-13 NOTE — ASSESSMENT & PLAN NOTE
60 y.o. yo male with unknown past medical history who presented to [facility] with AMS s/p fall. LKN ~1130 pm on 12/5. CTA completed at OSH revealed left MCA proximal occlusion. Patient transferred to [facility] for possible intervention; VAN +. Patient taken to IR and is now s/p successful thrombectomy with TICI 2b flow. Admitted to [facility] for close monitoring/higher level of care.     Noted Left MCA distribution infarct with small hemorrhagic conversion in the insula and temporal lobe on MRI Brain. Etiology ESUS at this time. Will plan for EP referral for loop recorder placement at discharge.    Patient progressing well; dispo outpatient therapy. Plans for [facility] nursing home placement as patient does not have a safe discharge plan. Labs qOD.      Antithrombotics for secondary stroke prevention: ASA 81mg QD  Statins for secondary stroke prevention and hyperlipidemia, if present:   Statins: Atorvastatin- 40 mg daily  Aggressive risk factor modification: HTN, HLD, Diet, Exercise, Obesity  Rehab efforts: The patient has been evaluated by a stroke team provider and the therapy needs have been fully considered based off the presenting complaints and exam findings. The following therapy evaluations are needed: PT evaluate and treat, OT evaluate and treat, SLP evaluate and treat, PM&R evaluate for appropriate placement- Dispo outpatient therapy however electing for [facility] nursing home placement  Diagnostics ordered/pending: none  VTE prophylaxis: Heparin 5000 units SQ every 8 hours; SCDs  BP parameters: Infarct: Post successful thrombectomy, SBP < 160

## 2019-12-13 NOTE — SUBJECTIVE & OBJECTIVE
Neurologic Chief Complaint:  L MCA, expressive aphasia     Subjective:     Interval History: Patient is seen for follow-up neurological assessment and treatment recommendations: DERECK PAYNE with worsened pain, firmness and spreading erythema; d/c'd Bactrim and started IV Vanc with pharmacy assistance. Labs qOD.    HPI, Past Medical, Family, and Social History remains the same as documented in the initial encounter.     Review of Systems   Constitutional: Negative for fatigue and fever.   HENT: Positive for mouth sores (reports occasional bleeding R tonsil). Negative for trouble swallowing.    Skin: Positive for color change and wound.   Neurological: Negative for facial asymmetry, speech difficulty, weakness, light-headedness and numbness.   Psychiatric/Behavioral: Negative for confusion and decreased concentration.     Scheduled Meds:   aspirin  81 mg Oral Daily    atorvastatin  40 mg Oral QHS    diclofenac sodium  2 g Topical (Top) BID    heparin (porcine)  5,000 Units Subcutaneous Q8H    lisinopril  10 mg Oral Daily    mupirocin   Topical (Top) BID    senna-docusate 8.6-50 mg  1 tablet Oral BID    vancomycin (VANCOCIN) IVPB  1,250 mg Intravenous Q12H     Continuous Infusions:  PRN Meds:acetaminophen, sodium chloride 0.9%, sodium chloride 0.9%    Objective:     Vital Signs (Most Recent):  Temp: 97.7 °F (36.5 °C) (12/13/19 1401)  Pulse: 76 (12/13/19 1401)  Resp: 17 (12/13/19 1401)  BP: 139/73 (12/13/19 1401)  SpO2: 95 % (12/13/19 1401)  BP Location: Right arm    Vital Signs Range (Last 24H):  Temp:  [97 °F (36.1 °C)-98.4 °F (36.9 °C)]   Pulse:  [58-79]   Resp:  [17-20]   BP: (124-144)/(60-82)   SpO2:  [93 %-98 %]   BP Location: Right arm    Physical Exam   Constitutional: He is oriented to person, place, and time. He appears well-developed. No distress.   HENT:   Head: Normocephalic and atraumatic.   Eyes: Conjunctivae and EOM are normal.   Cardiovascular: Normal rate.   Pulmonary/Chest: Effort normal. No  respiratory distress.   Musculoskeletal: Normal range of motion. He exhibits no edema or deformity.   Neurological: He is alert and oriented to person, place, and time. No sensory deficit. He exhibits normal muscle tone.   Skin: Skin is warm and dry. He is not diaphoretic. There is erythema (L anterior forearm with abrasion and erythema surrounding with firmness to palpation. Erythema now spreading up the arm.).   Psychiatric: He has a normal mood and affect. His behavior is normal.   Vitals reviewed.      Neurological Exam:   LOC: alert  Attention Span: Good   Language: No aphasia  Articulation: No dysarthria  Orientation: Person, Place, Time   Visual Fields: Full  EOM (CN III, IV, VI): Full/intact  Facial Movement (CN VII): Symmetric facial expression    Motor: Arm left  Normal 5/5  Leg left  Normal 5/5  Arm right  Normal 5/5  Leg right Normal 5/5  Sensation: Intact to light touch, temperature and vibration  Tone: Normal tone throughout    Laboratory:  CMP:   Recent Labs   Lab 12/13/19  0351   CALCIUM 8.9   ALBUMIN 2.9*   PROT 6.5      K 4.2   CO2 25      BUN 14   CREATININE 0.8   ALKPHOS 61   ALT 11   AST 15   BILITOT 0.2     BMP:   Recent Labs   Lab 12/13/19  0351      K 4.2      CO2 25   BUN 14   CREATININE 0.8   CALCIUM 8.9     CBC:   Recent Labs   Lab 12/13/19  0351   WBC 7.74   RBC 4.17*   HGB 12.8*   HCT 38.1*      MCV 91   MCH 30.7   MCHC 33.6     Lipid Panel:   No results for input(s): CHOL, LDLCALC, HDL, TRIG in the last 168 hours.  Coagulation:   Recent Labs   Lab 12/07/19  0307   INR 1.0   APTT 25.6     Platelet Aggregation Study: No results for input(s): PLTAGG, PLTAGINTERP, PLTAGREGLACO, ADPPLTAGGREG in the last 168 hours.  Hgb A1C:   Recent Labs   Lab 12/06/19  1635   HGBA1C 5.7*     TSH:   No results for input(s): TSH in the last 168 hours.      Diagnostic Results     Brain imaging:      CT Head 12/8/19  1. Evolving left MCA distribution infarct with superimposed  petechial hemorrhage and mild mass effect resulting in minimal rightward midline shift of 1 mm.    MRI Brain 12/07/2019    1. Left MCA distribution infarct with hemorrhagic conversion in the insula and temporal lobe.  Associated localized mass effect without midline shift.  2. Chronic microvascular ischemic change.    CT Head 12/06/2019    Ill-defined regions of decreased attenuation left MCA territory specifically left insula, left posterior temporoparietal cortex and lateral aspect of the left lentiform nucleus most compatible with acute areas of infarction.  There is no significant mass effect or midline shift.  No evidence for hemorrhagic conversion.  There is slight hyperdensity associated with the intracranial vasculature likely related to recent contrast administration for outside institution angiography       Vessel Imaging     US UE Left 12/11/19  Partially occlusive thrombosis of the cephalic vein (superficial vein).  No evidence of deep venous thrombosis.    Cerebral Angiogram 12/6/19  Technically successful aspiration thrombectomy of the left MCA distal M1 segment artery with resulting TICI 2b flow.       Cardiac Evaluation:     TTE 12/6/19  · Normal left ventricular systolic function. The estimated ejection fraction is 65%  · Eccentric left ventricular hypertrophy.  · Indeterminate left ventricular diastolic function.  · Normal right ventricular systolic function.  · Mild aortic valve stenosis. peak velocity is 2.94 m/s; mean gradient is 20 mmHg. ABBIE cannot be measured accurately.  · Normal central venous pressure (3 mm Hg).  · Mild mitral regurgitation.  · No wall motion abnormalities.         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  Abnormal conglomerate of level II/III lymph nodes with necrotic centers with surrounding inflammatory stranding and numerous adjacent enlarged solid lymph nodes.  Constellation of findings  concerning for neoplastic process such as metastatic squamous cell carcinoma.

## 2019-12-14 PROBLEM — Z74.09 DECREASED STRENGTH, ENDURANCE, AND MOBILITY: Status: RESOLVED | Noted: 2019-12-07 | Resolved: 2019-12-14

## 2019-12-14 PROBLEM — R68.89 DECREASED STRENGTH, ENDURANCE, AND MOBILITY: Status: RESOLVED | Noted: 2019-12-07 | Resolved: 2019-12-14

## 2019-12-14 PROBLEM — R53.1 DECREASED STRENGTH, ENDURANCE, AND MOBILITY: Status: RESOLVED | Noted: 2019-12-07 | Resolved: 2019-12-14

## 2019-12-14 PROCEDURE — 99233 PR SUBSEQUENT HOSPITAL CARE,LEVL III: ICD-10-PCS | Mod: ,,, | Performed by: PSYCHIATRY & NEUROLOGY

## 2019-12-14 PROCEDURE — 25000003 PHARM REV CODE 250: Performed by: STUDENT IN AN ORGANIZED HEALTH CARE EDUCATION/TRAINING PROGRAM

## 2019-12-14 PROCEDURE — 63600175 PHARM REV CODE 636 W HCPCS: Performed by: PSYCHIATRY & NEUROLOGY

## 2019-12-14 PROCEDURE — 36415 COLL VENOUS BLD VENIPUNCTURE: CPT

## 2019-12-14 PROCEDURE — A4216 STERILE WATER/SALINE, 10 ML: HCPCS | Performed by: STUDENT IN AN ORGANIZED HEALTH CARE EDUCATION/TRAINING PROGRAM

## 2019-12-14 PROCEDURE — 80202 ASSAY OF VANCOMYCIN: CPT

## 2019-12-14 PROCEDURE — 99233 SBSQ HOSP IP/OBS HIGH 50: CPT | Mod: ,,, | Performed by: PSYCHIATRY & NEUROLOGY

## 2019-12-14 PROCEDURE — 25000003 PHARM REV CODE 250: Performed by: PSYCHIATRY & NEUROLOGY

## 2019-12-14 PROCEDURE — 25000003 PHARM REV CODE 250: Performed by: NURSE PRACTITIONER

## 2019-12-14 PROCEDURE — 63600175 PHARM REV CODE 636 W HCPCS: Performed by: NURSE PRACTITIONER

## 2019-12-14 PROCEDURE — 11000001 HC ACUTE MED/SURG PRIVATE ROOM

## 2019-12-14 RX ADMIN — Medication 10 ML: at 10:12

## 2019-12-14 RX ADMIN — MUPIROCIN: 20 OINTMENT TOPICAL at 08:12

## 2019-12-14 RX ADMIN — DICLOFENAC 2 G: 10 GEL TOPICAL at 09:12

## 2019-12-14 RX ADMIN — DICLOFENAC 2 G: 10 GEL TOPICAL at 08:12

## 2019-12-14 RX ADMIN — VANCOMYCIN HYDROCHLORIDE 1250 MG: 1.25 INJECTION, POWDER, LYOPHILIZED, FOR SOLUTION INTRAVENOUS at 11:12

## 2019-12-14 RX ADMIN — VANCOMYCIN HYDROCHLORIDE 1250 MG: 1.25 INJECTION, POWDER, LYOPHILIZED, FOR SOLUTION INTRAVENOUS at 10:12

## 2019-12-14 RX ADMIN — ATORVASTATIN CALCIUM 40 MG: 20 TABLET, FILM COATED ORAL at 08:12

## 2019-12-14 RX ADMIN — HEPARIN SODIUM 5000 UNITS: 5000 INJECTION INTRAVENOUS; SUBCUTANEOUS at 08:12

## 2019-12-14 RX ADMIN — MUPIROCIN: 20 OINTMENT TOPICAL at 09:12

## 2019-12-14 RX ADMIN — LISINOPRIL 10 MG: 5 TABLET ORAL at 09:12

## 2019-12-14 RX ADMIN — HEPARIN SODIUM 5000 UNITS: 5000 INJECTION INTRAVENOUS; SUBCUTANEOUS at 02:12

## 2019-12-14 RX ADMIN — ASPIRIN 81 MG: 81 TABLET, COATED ORAL at 09:12

## 2019-12-14 RX ADMIN — SENNOSIDES AND DOCUSATE SODIUM 1 TABLET: 8.6; 5 TABLET ORAL at 09:12

## 2019-12-14 RX ADMIN — HEPARIN SODIUM 5000 UNITS: 5000 INJECTION INTRAVENOUS; SUBCUTANEOUS at 06:12

## 2019-12-14 NOTE — PLAN OF CARE
Problem: Adult Inpatient Plan of Care  Goal: Plan of Care Review  Outcome: Ongoing, Progressing     Problem: Adjustment to Illness (Stroke, Ischemic/Transient Ischemic Attack)  Goal: Optimal Coping  Outcome: Ongoing, Progressing     Problem: Hemodynamic Instability (Stroke, Ischemic/Transient Ischemic Attack)  Goal: Vital Signs Remain in Desired Range  Outcome: Ongoing, Progressing

## 2019-12-14 NOTE — PLAN OF CARE
Recv'd pt in bed AAOx3.  POC discussed with the pt.  Physical assessment obtained.  See flow sheets for further information.  IV site changed.  Pt rested well overnight and denies pain or other needs upon final rounds this shift.  Monitoring.  Problem: Skin Injury Risk Increased  Goal: Skin Health and Integrity  Outcome: Ongoing, Progressing     Problem: Adult Inpatient Plan of Care  Goal: Optimal Comfort and Wellbeing  Outcome: Ongoing, Progressing     Problem: Fall Injury Risk  Goal: Absence of Fall and Fall-Related Injury  Outcome: Ongoing, Progressing

## 2019-12-15 LAB — VANCOMYCIN TROUGH SERPL-MCNC: 9.8 UG/ML (ref 10–22)

## 2019-12-15 PROCEDURE — 25000003 PHARM REV CODE 250: Performed by: PSYCHIATRY & NEUROLOGY

## 2019-12-15 PROCEDURE — 25000003 PHARM REV CODE 250: Performed by: NURSE PRACTITIONER

## 2019-12-15 PROCEDURE — 63600175 PHARM REV CODE 636 W HCPCS: Performed by: NURSE PRACTITIONER

## 2019-12-15 PROCEDURE — 11000001 HC ACUTE MED/SURG PRIVATE ROOM

## 2019-12-15 PROCEDURE — 99233 SBSQ HOSP IP/OBS HIGH 50: CPT | Mod: ,,, | Performed by: PSYCHIATRY & NEUROLOGY

## 2019-12-15 PROCEDURE — 94761 N-INVAS EAR/PLS OXIMETRY MLT: CPT

## 2019-12-15 PROCEDURE — 99233 PR SUBSEQUENT HOSPITAL CARE,LEVL III: ICD-10-PCS | Mod: ,,, | Performed by: PSYCHIATRY & NEUROLOGY

## 2019-12-15 PROCEDURE — 25000003 PHARM REV CODE 250: Performed by: STUDENT IN AN ORGANIZED HEALTH CARE EDUCATION/TRAINING PROGRAM

## 2019-12-15 PROCEDURE — 63600175 PHARM REV CODE 636 W HCPCS: Performed by: PSYCHIATRY & NEUROLOGY

## 2019-12-15 PROCEDURE — A4216 STERILE WATER/SALINE, 10 ML: HCPCS | Performed by: STUDENT IN AN ORGANIZED HEALTH CARE EDUCATION/TRAINING PROGRAM

## 2019-12-15 RX ADMIN — SENNOSIDES AND DOCUSATE SODIUM 1 TABLET: 8.6; 5 TABLET ORAL at 10:12

## 2019-12-15 RX ADMIN — LISINOPRIL 10 MG: 5 TABLET ORAL at 12:12

## 2019-12-15 RX ADMIN — VANCOMYCIN HYDROCHLORIDE 1250 MG: 1.25 INJECTION, POWDER, LYOPHILIZED, FOR SOLUTION INTRAVENOUS at 10:12

## 2019-12-15 RX ADMIN — SENNOSIDES AND DOCUSATE SODIUM 1 TABLET: 8.6; 5 TABLET ORAL at 08:12

## 2019-12-15 RX ADMIN — ASPIRIN 81 MG: 81 TABLET, COATED ORAL at 08:12

## 2019-12-15 RX ADMIN — HEPARIN SODIUM 5000 UNITS: 5000 INJECTION INTRAVENOUS; SUBCUTANEOUS at 02:12

## 2019-12-15 RX ADMIN — HEPARIN SODIUM 5000 UNITS: 5000 INJECTION INTRAVENOUS; SUBCUTANEOUS at 05:12

## 2019-12-15 RX ADMIN — MUPIROCIN: 20 OINTMENT TOPICAL at 10:12

## 2019-12-15 RX ADMIN — Medication 10 ML: at 08:12

## 2019-12-15 RX ADMIN — HEPARIN SODIUM 5000 UNITS: 5000 INJECTION INTRAVENOUS; SUBCUTANEOUS at 10:12

## 2019-12-15 RX ADMIN — DICLOFENAC 2 G: 10 GEL TOPICAL at 10:12

## 2019-12-15 RX ADMIN — MUPIROCIN: 20 OINTMENT TOPICAL at 08:12

## 2019-12-15 RX ADMIN — VANCOMYCIN HYDROCHLORIDE 1500 MG: 1.5 INJECTION, POWDER, LYOPHILIZED, FOR SOLUTION INTRAVENOUS at 10:12

## 2019-12-15 RX ADMIN — ATORVASTATIN CALCIUM 40 MG: 20 TABLET, FILM COATED ORAL at 10:12

## 2019-12-15 RX ADMIN — DICLOFENAC 2 G: 10 GEL TOPICAL at 08:12

## 2019-12-15 NOTE — ASSESSMENT & PLAN NOTE
- Pt reports hx R neck/inferior to jaw mass present for the last year or so; denies prior evaluations. He denies hx chewing tobacco abuse. Reports occasional R tonsillar bleeding as well.  - CT Soft Tissue Neck demonstrating mass with multiple adjacent necrotic and large, solid lymph nodes. Concern for neoplastic process. Discussed these findings with patient.  - Curbside to ENT - No plans for acute intervention; outpatient workup. Will order Ambulatory referral to ENT with Dr. Palmer at discharge.

## 2019-12-15 NOTE — PHYSICIAN QUERY
PT Name: Riaz Lane  MR #: [id number]     PHYSICIAN QUERY -  ACUITY OF CONDITION CLARIFICATION      CDS/: Concetta Meza RN, CDS               Contact information: [email or web address]  This form is a permanent document in the medical record.     Query Date: December 15, 2019    By submitting this query, we are merely seeking further clarification of documentation to reflect the severity of illness of your patient. Please utilize your independent clinical judgment when addressing the question(s) below.    The Medical record reflects the following:     Indicators   Supporting Clinical Findings Location in Medical Record   x Documentation of condition --Cellulitis  -LUE with erythema, tenderness, and hard to palpation - previous IV site        -US completed - Partially occlusive thrombosis of the cephalic vein (superficial vein); No DVT    --IV Abx for L arm cellulitis/thrombophlebitis that did not improve on PO Abx   [facility] Neuro Note 12/12    Lab Value(s)     x Radiology Findings --Partially occlusive thrombosis of the cephalic vein (superficial vein).   Upper Extremity US 12/11     x Treatment                                 Medication --Started patient on Bactrim DS BID X 5 days, Bactroban BID X 5 days, and warm compress     -- Initial treatment w/ Bactrim did not result in significant improvement             - Transitioned to Vanc and on Day #2 patient w/ improved symptoms, but remaining mild erythema and noted hardening/cord of the L forearm    VAs Neuro Note 12/12    [facility] Neuro Note 12/14    Other       Provider, please specify the acuity/chronicity of _Partially occlusive thrombosis of the cephalic vein (superficial vein)_:    [ x ] Acute   [   ] Chronic   [   ]  Clinically Undetermined       Please document in your progress notes daily for the duration of treatment until resolved, and include in your discharge summary.

## 2019-12-15 NOTE — ASSESSMENT & PLAN NOTE
- Pt reports hx R neck/inferior to jaw mass present for the last year or so; denies prior evaluations. He denies hx chewing tobacco abuse. Reports occasional R tonsillar bleeding as well.  - CT Soft Tissue Neck demonstrating mass with multiple adjacent necrotic and large, solid lymph nodes. Concern for neoplastic process. Discussed these findings with patient.  - Curbside to ENT - No plans for acute intervention; outpatient workup. Will order Ambulatory referral to ENT with Dr. Palmer at discharge

## 2019-12-15 NOTE — ASSESSMENT & PLAN NOTE
- LUE with erythema, tenderness, and firmness to palpation - prior IV site.  - US LUE 12/12 showed partially occlusive thrombosis of the cephalic vein (superficial vein); no DVT  - Initial treatment w/ Bactrim did not result in significant improvement  - Transitioned to Vanc and on Day #2 patient w/ improved symptoms, but remaining mild erythema and noted hardening/cord of the L forearm

## 2019-12-15 NOTE — ASSESSMENT & PLAN NOTE
- Area of cytotoxic cerebral edema identified when reviewing brain imaging in the territory of the left middle cerebral artery. There (is/is not) mass effect associated with it. We will continue to monitor the patients clinical exam for any worsening of symptoms which may indicate expansion of the stroke or the area of the edema resulting in the clinical change  - The pattern is suggestive of ESUS etiology

## 2019-12-15 NOTE — PROGRESS NOTES
Pharmacokinetic Assessment Follow Up: IV Vancomycin    Vancomycin serum concentration assessment(s):    The trough level of 9.8 mcg/ml was drawn correctly and can be used to guide therapy at this time. The measurement is below the desired  target range of 10 to 20 mcg/mL.    Vancomycin Regimen Plan:    Change regimen to Vancomycin 1500 mg IV every 12 hours with next serum trough concentration measured at 0930 prior to 4th dose on 12/17/19    Drug levels (last 3 results):  Recent Labs   Lab Result Units 12/14/19  2315   Vancomycin-Trough ug/mL 9.8*       Pharmacy will continue to follow and monitor vancomycin.    Please contact pharmacy at extension [phone] for questions regarding this assessment.    Thank you for the consult,   Caroline Barraza       Patient brief summary:  Riaz Lane is a 59 y.o. male initiated on antimicrobial therapy with IV Vancomycin for treatment of skin & soft tissue infection    Drug Allergies:   Review of patient's allergies indicates:  No Known Allergies    Actual Body Weight:   94.1 kg    Renal Function:   Estimated Creatinine Clearance: 110.7 mL/min (based on SCr of 0.8 mg/dL).,     CBC (last 72 hours):  Recent Labs   Lab Result Units 12/13/19  0351   WBC K/uL 7.74   Hemoglobin g/dL 12.8*   Hematocrit % 38.1*   Platelets K/uL 290   Gran% % 50.4   Lymph% % 37.6   Mono% % 8.4   Eosinophil% % 2.3   Basophil% % 1.0   Differential Method  Automated       Metabolic Panel (last 72 hours):  Recent Labs   Lab Result Units 12/13/19  0351   Sodium mmol/L 140   Potassium mmol/L 4.2   Chloride mmol/L 108   CO2 mmol/L 25   Glucose mg/dL 87   BUN, Bld mg/dL 14   Creatinine mg/dL 0.8   Albumin g/dL 2.9*   Total Bilirubin mg/dL 0.2   Alkaline Phosphatase U/L 61   AST U/L 15   ALT U/L 11   Magnesium mg/dL 1.8   Phosphorus mg/dL 3.8       Vancomycin Administrations:  vancomycin given in the last 96 hours                   vancomycin 1.25 g in dextrose 5% 250 mL IVPB (ready to mix) (mg) 1,250 mg New Bag 12/15/19  1040     1,250 mg New Bag 12/14/19 2342     1,250 mg New Bag  1052     1,250 mg New Bag 12/13/19 2235     1,250 mg New Bag  1212                Microbiologic Results:  Microbiology Results (last 7 days)     ** No results found for the last 168 hours. **

## 2019-12-15 NOTE — PROGRESS NOTES
[facility]  Vascular Neurology  Comprehensive Stroke Center  Progress Note    Assessment/Plan:     * Embolic stroke involving left middle cerebral artery  60 y.o. yo male with unknown past medical history who presented to [place] with AMS s/p fall. LKN ~1130 pm on 12/5. CTA completed at OSH revealed left MCA proximal occlusion. [place] [facility] [place]; VAN +. Patient taken to IR and is now s/p successful thrombectomy with TICI 2b flow. Admitted to [facility] for close monitoring/higher level of care.     Noted Left MCA distribution infarct with small hemorrhagic conversion in the insula and temporal lobe on MRI Brain. Etiology ESUS at this time. Will plan for EP referral for loop recorder placement at discharge.    Patient progressing well; dispo outpatient therapy. Plans for detention nursing home placement as patient does not have a safe discharge plan.   IV Abx for L arm cellulitis/thrombophlebitis that did not improve on PO Abx      Antithrombotics for secondary stroke prevention: ASA 81mg QD  Statins for secondary stroke prevention and hyperlipidemia, if present:   Statins: Atorvastatin- 40 mg daily  Aggressive risk factor modification: HTN, HLD, Diet, Exercise, Obesity  Rehab efforts: The patient has been evaluated by a stroke team provider and the therapy needs have been fully considered based off the presenting complaints and exam findings. The following therapy evaluations are needed: PT evaluate and treat, OT evaluate and treat, SLP evaluate and treat, PM&R evaluate for appropriate placement- Dispo outpatient therapy however electing for detention nursing home placement  Diagnostics ordered/pending: none  VTE prophylaxis: Heparin 5000 units SQ every 8 hours; SCDs  BP parameters: Infarct: Post successful thrombectomy, SBP < 160    Cellulitis  - LUE with erythema, tenderness, and firmness to palpation - prior IV site.  - US LUE 12/12 showed partially occlusive  thrombosis of the cephalic vein (superficial vein); no DVT  - Initial treatment w/ Bactrim did not result in significant improvement  - Transitioned to Vanc and on Day #2 patient w/ improved symptoms, but remaining mild erythema and noted hardening/cord of the L forearm       Oropharyngeal mass  - Pt reports hx R neck/inferior to jaw mass present for the last year or so; denies prior evaluations. He denies hx chewing tobacco abuse. Reports occasional R tonsillar bleeding as well.  - CT Soft Tissue Neck demonstrating mass with multiple adjacent necrotic and large, solid lymph nodes. Concern for neoplastic process. Discussed these findings with patient.  - Curbside to ENT - No plans for acute intervention; outpatient workup. Will order Ambulatory referral to ENT with Dr. Palmer at discharge.    Essential hypertension  - Stroke risk factor  - SBP < 160  - BP stable on current regimen    Aphasia due to acute cerebrovascular accident (CVA)  - Now significantly improved/near resolved  - Continue aggressive SLP    Cytotoxic cerebral edema  - Area of cytotoxic cerebral edema identified when reviewing brain imaging in the territory of the left middle cerebral artery. There (is/is not) mass effect associated with it. We will continue to monitor the patients clinical exam for any worsening of symptoms which may indicate expansion of the stroke or the area of the edema resulting in the clinical change.   - The pattern is suggestive of ESUS etiology.    Mixed hyperlipidemia  - Stroke risk factor   -   - Continue Atorvastatin 40 mg          12/08/2019 improvement of expressive aphasia today   12/09/2019: NAEON. BP elevated overnight. Coreg discontinued. Lisinopril started.   12/10/2019: Patient progressing well. PT/OT now recommending home with outpatient therapy. Patient is homeless. CM/SW working to plan safe discharge.   12/11: CT Soft Tissue Neck demonstrating mass; curbside to ENT. Plans for [facility] nursing home  placement as patient does not have a safe discharge plan.   12/12 - Started on Bactrim DS X 5 days for LUE cellulitis. CM/SW arranging follow up at [place] for evaluation of neck mass.   12/13: LUE with worsened pain, firmness and spreading erythema; d/c'd Bactrim and started IV Vanc with pharmacy assistance. Labs qOD.  12/14/2019Improvement in pain and appearance of LUE. Patient doing well and pending discharge to NH.       STROKE DOCUMENTATION   Acute Stroke Times   Last Known Normal Date: 12/05/19  Last Known Normal Time: 2330  Stroke Team Called Date: 12/06/19  Stroke Team Called Time: 0800  Stroke Team Arrival Date: 12/06/19  Stroke Team Arrival Time: 0804  CT Interpretation Time: 0810  Decision to Treat Time for Alteplase: (N/A)    NIH Scale:  1a. Level of Consciousness: 0-->Alert, keenly responsive  1b. LOC Questions: 0-->Answers both questions correctly  1c. LOC Commands: 0-->Performs both tasks correctly  2. Best Gaze: 0-->Normal  3. Visual: 0-->No visual loss  4. Facial Palsy: 0-->Normal symmetrical movements  5a. Motor Arm, Left: 0-->No drift, limb holds 90 (or 45) degrees for full 10 secs  5b. Motor Arm, Right: 0-->No drift, limb holds 90 (or 45) degrees for full 10 secs  6a. Motor Leg, Left: 0-->No drift, leg holds 30 degree position for full 5 secs  6b. Motor Leg, Right: 0-->No drift, leg holds 30 degree position for full 5 secs  7. Limb Ataxia: 0-->Absent  8. Sensory: 0-->Normal, no sensory loss  9. Best Language: 0-->No aphasia, normal  10. Dysarthria: 0-->Normal  11. Extinction and Inattention (formerly Neglect): 0-->No abnormality  Total (NIH Stroke Scale): 0     Modified Sharmaine:1  [place] Coma Scale:    ABCD2 Score:    [id number] Score:   HAS -BLED Score:   ICH Score:   Hunt & Cook Classification:      Hemorrhagic change of an Ischemic Stroke: Does this patient have an ischemic stroke with hemorrhagic changes? No     Neurologic Chief Complaint:  L MCA, expressive aphasia     Subjective:      Interval History: Patient is seen for follow-up neurological assessment and treatment recommendations:   Improvement in pain and appearance of LUE. Patient doing well and pending discharge to NH.   Continuing Abx.     HPI, Past Medical, Family, and Social History remains the same as documented in the initial encounter.     Review of Systems   Constitutional: Negative for fatigue and fever.   HENT: Positive for mouth sores (reports occasional bleeding R tonsil). Negative for trouble swallowing.    Skin: Positive for color change and wound.   Neurological: Negative for facial asymmetry, speech difficulty, weakness, light-headedness and numbness.   Psychiatric/Behavioral: Negative for confusion and decreased concentration.     Scheduled Meds:   aspirin  81 mg Oral Daily    atorvastatin  40 mg Oral QHS    diclofenac sodium  2 g Topical (Top) BID    heparin (porcine)  5,000 Units Subcutaneous Q8H    lisinopril  10 mg Oral Daily    mupirocin   Topical (Top) BID    senna-docusate 8.6-50 mg  1 tablet Oral BID    vancomycin (VANCOCIN) IVPB  1,250 mg Intravenous Q12H     Continuous Infusions:  PRN Meds:acetaminophen, sodium chloride 0.9%, sodium chloride 0.9%    Objective:     Vital Signs (Most Recent):  Temp: 97.5 °F (36.4 °C) (12/14/19 1636)  Pulse: 68 (12/14/19 1636)  Resp: 17 (12/14/19 1636)  BP: (!) 145/70 (12/14/19 1636)  SpO2: 96 % (12/14/19 1636)  BP Location: Left arm    Vital Signs Range (Last 24H):  Temp:  [96 °F (35.6 °C)-98.1 °F (36.7 °C)]   Pulse:  [63-76]   Resp:  [17-20]   BP: (141-159)/(70-84)   SpO2:  [96 %-98 %]   BP Location: Left arm    Physical Exam   Constitutional: He is oriented to person, place, and time. He appears well-developed. No distress.   HENT:   Head: Normocephalic and atraumatic.   Eyes: Conjunctivae and EOM are normal.   Cardiovascular: Normal rate.   Pulmonary/Chest: Effort normal. No respiratory distress.   Musculoskeletal: Normal range of motion. He exhibits no edema or  deformity.   Neurological: He is alert and oriented to person, place, and time. No sensory deficit. He exhibits normal muscle tone.   Skin: Skin is warm and dry. He is not diaphoretic. There is erythema.   LUE erythema w/ significant improvement. Forearm hardening/cord still present, but also improved.    Psychiatric: He has a normal mood and affect. His behavior is normal.   Vitals reviewed.      Neurological Exam:   LOC: alert  Attention Span: Good   Language: No aphasia  Articulation: No dysarthria  Orientation: Person, Place, Time   Visual Fields: Full  EOM (CN III, IV, VI): Full/intact  Facial Movement (CN VII): Symmetric facial expression    Motor: Arm left  Normal 5/5  Leg left  Normal 5/5  Arm right  Normal 5/5  Leg right Normal 5/5  Sensation: Intact to light touch, temperature and vibration  Tone: Normal tone throughout    Laboratory:  CMP:   No results for input(s): GLUCOSE, CALCIUM, ALBUMIN, PROT, NA, K, CO2, CL, BUN, CREATININE, ALKPHOS, ALT, AST, BILITOT in the last 24 hours.  BMP:   Recent Labs   Lab 12/13/19  0351      K 4.2      CO2 25   BUN 14   CREATININE 0.8   CALCIUM 8.9     CBC:   Recent Labs   Lab 12/13/19  0351   WBC 7.74   RBC 4.17*   HGB 12.8*   HCT 38.1*      MCV 91   MCH 30.7   MCHC 33.6     Lipid Panel:   No results for input(s): CHOL, LDLCALC, HDL, TRIG in the last 168 hours.  Coagulation:   No results for input(s): PT, INR, APTT in the last 168 hours.  Platelet Aggregation Study: No results for input(s): PLTAGG, PLTAGINTERP, PLTAGREGLACO, ADPPLTAGGREG in the last 168 hours.  Hgb A1C:   No results for input(s): HGBA1C in the last 168 hours.  TSH:   No results for input(s): TSH in the last 168 hours.      Diagnostic Results     Brain imaging:      CT Head 12/8/19  1. Evolving left MCA distribution infarct with superimposed petechial hemorrhage and mild mass effect resulting in minimal rightward midline shift of 1 mm.    MRI Brain 12/07/2019    1. Left MCA distribution  infarct with hemorrhagic conversion in the insula and temporal lobe.  Associated localized mass effect without midline shift.  2. Chronic microvascular ischemic change.    CT Head 12/06/2019    Ill-defined regions of decreased attenuation left MCA territory specifically left insula, left posterior temporoparietal cortex and lateral aspect of the left lentiform nucleus most compatible with acute areas of infarction.  There is no significant mass effect or midline shift.  No evidence for hemorrhagic conversion.  There is slight hyperdensity associated with the intracranial vasculature likely related to recent contrast administration for outside institution angiography       Vessel Imaging     US UE Left 12/11/19  Partially occlusive thrombosis of the cephalic vein (superficial vein).  No evidence of deep venous thrombosis.    Cerebral Angiogram 12/6/19  Technically successful aspiration thrombectomy of the left MCA distal M1 segment artery with resulting TICI 2b flow.       Cardiac Evaluation:     TTE 12/6/19  · Normal left ventricular systolic function. The estimated ejection fraction is 65%  · Eccentric left ventricular hypertrophy.  · Indeterminate left ventricular diastolic function.  · Normal right ventricular systolic function.  · Mild aortic valve stenosis. peak velocity is 2.94 m/s; mean gradient is 20 mmHg. ABBIE cannot be measured accurately.  · Normal central venous pressure (3 mm Hg).  · Mild mitral regurgitation.  · No wall motion abnormalities.         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  Abnormal conglomerate of level II/III lymph nodes with necrotic centers with surrounding inflammatory stranding and numerous adjacent enlarged solid lymph nodes.  Constellation of findings concerning for neoplastic process such as metastatic squamous cell carcinoma.      Lorena Horvath MD  [facility]  Department of  Vascular Neurology   [facility]

## 2019-12-15 NOTE — ASSESSMENT & PLAN NOTE
- LUE with erythema, tenderness, and firmness to palpation - prior IV site.  - US LUE 12/12 showed partially occlusive thrombosis of the cephalic vein (superficial vein); no DVT  - Initial treatment w/ Bactrim did not result in significant improvement  - Transitioned to Vanc and on Day #3 patient w/ improved symptoms   - Can complete 5 day course, or switch to bactrim on 12/16 if desired

## 2019-12-15 NOTE — ASSESSMENT & PLAN NOTE
- Area of cytotoxic cerebral edema identified when reviewing brain imaging in the territory of the left middle cerebral artery. There (is/is not) mass effect associated with it. We will continue to monitor the patients clinical exam for any worsening of symptoms which may indicate expansion of the stroke or the area of the edema resulting in the clinical change.   - The pattern is suggestive of ESUS etiology.

## 2019-12-15 NOTE — SUBJECTIVE & OBJECTIVE
Neurologic Chief Complaint:  L MCA, expressive aphasia     Subjective:     Interval History: Patient is seen for follow-up neurological assessment and treatment recommendations:   Improvement in pain and appearance of LUE. Patient doing well and pending discharge to NH.   Continuing Abx.     HPI, Past Medical, Family, and Social History remains the same as documented in the initial encounter.     Review of Systems   Constitutional: Negative for fatigue and fever.   HENT: Positive for mouth sores (reports occasional bleeding R tonsil). Negative for trouble swallowing.    Skin: Positive for color change and wound.   Neurological: Negative for facial asymmetry, speech difficulty, weakness, light-headedness and numbness.   Psychiatric/Behavioral: Negative for confusion and decreased concentration.     Scheduled Meds:   aspirin  81 mg Oral Daily    atorvastatin  40 mg Oral QHS    diclofenac sodium  2 g Topical (Top) BID    heparin (porcine)  5,000 Units Subcutaneous Q8H    lisinopril  10 mg Oral Daily    mupirocin   Topical (Top) BID    senna-docusate 8.6-50 mg  1 tablet Oral BID    vancomycin (VANCOCIN) IVPB  1,250 mg Intravenous Q12H     Continuous Infusions:  PRN Meds:acetaminophen, sodium chloride 0.9%, sodium chloride 0.9%    Objective:     Vital Signs (Most Recent):  Temp: 97.5 °F (36.4 °C) (12/14/19 1636)  Pulse: 68 (12/14/19 1636)  Resp: 17 (12/14/19 1636)  BP: (!) 145/70 (12/14/19 1636)  SpO2: 96 % (12/14/19 1636)  BP Location: Left arm    Vital Signs Range (Last 24H):  Temp:  [96 °F (35.6 °C)-98.1 °F (36.7 °C)]   Pulse:  [63-76]   Resp:  [17-20]   BP: (141-159)/(70-84)   SpO2:  [96 %-98 %]   BP Location: Left arm    Physical Exam   Constitutional: He is oriented to person, place, and time. He appears well-developed. No distress.   HENT:   Head: Normocephalic and atraumatic.   Eyes: Conjunctivae and EOM are normal.   Cardiovascular: Normal rate.   Pulmonary/Chest: Effort normal. No respiratory distress.    Musculoskeletal: Normal range of motion. He exhibits no edema or deformity.   Neurological: He is alert and oriented to person, place, and time. No sensory deficit. He exhibits normal muscle tone.   Skin: Skin is warm and dry. He is not diaphoretic. There is erythema.   LUE erythema w/ significant improvement. Forearm hardening/cord still present, but also improved.    Psychiatric: He has a normal mood and affect. His behavior is normal.   Vitals reviewed.      Neurological Exam:   LOC: alert  Attention Span: Good   Language: No aphasia  Articulation: No dysarthria  Orientation: Person, Place, Time   Visual Fields: Full  EOM (CN III, IV, VI): Full/intact  Facial Movement (CN VII): Symmetric facial expression    Motor: Arm left  Normal 5/5  Leg left  Normal 5/5  Arm right  Normal 5/5  Leg right Normal 5/5  Sensation: Intact to light touch, temperature and vibration  Tone: Normal tone throughout    Laboratory:  CMP:   No results for input(s): GLUCOSE, CALCIUM, ALBUMIN, PROT, NA, K, CO2, CL, BUN, CREATININE, ALKPHOS, ALT, AST, BILITOT in the last 24 hours.  BMP:   Recent Labs   Lab 12/13/19  0351      K 4.2      CO2 25   BUN 14   CREATININE 0.8   CALCIUM 8.9     CBC:   Recent Labs   Lab 12/13/19  0351   WBC 7.74   RBC 4.17*   HGB 12.8*   HCT 38.1*      MCV 91   MCH 30.7   MCHC 33.6     Lipid Panel:   No results for input(s): CHOL, LDLCALC, HDL, TRIG in the last 168 hours.  Coagulation:   No results for input(s): PT, INR, APTT in the last 168 hours.  Platelet Aggregation Study: No results for input(s): PLTAGG, PLTAGINTERP, PLTAGREGLACO, ADPPLTAGGREG in the last 168 hours.  Hgb A1C:   No results for input(s): HGBA1C in the last 168 hours.  TSH:   No results for input(s): TSH in the last 168 hours.      Diagnostic Results     Brain imaging:      CT Head 12/8/19  1. Evolving left MCA distribution infarct with superimposed petechial hemorrhage and mild mass effect resulting in minimal rightward midline  shift of 1 mm.    MRI Brain 12/07/2019    1. Left MCA distribution infarct with hemorrhagic conversion in the insula and temporal lobe.  Associated localized mass effect without midline shift.  2. Chronic microvascular ischemic change.    CT Head 12/06/2019    Ill-defined regions of decreased attenuation left MCA territory specifically left insula, left posterior temporoparietal cortex and lateral aspect of the left lentiform nucleus most compatible with acute areas of infarction.  There is no significant mass effect or midline shift.  No evidence for hemorrhagic conversion.  There is slight hyperdensity associated with the intracranial vasculature likely related to recent contrast administration for outside institution angiography       Vessel Imaging     US UE Left 12/11/19  Partially occlusive thrombosis of the cephalic vein (superficial vein).  No evidence of deep venous thrombosis.    Cerebral Angiogram 12/6/19  Technically successful aspiration thrombectomy of the left MCA distal M1 segment artery with resulting TICI 2b flow.       Cardiac Evaluation:     TTE 12/6/19  · Normal left ventricular systolic function. The estimated ejection fraction is 65%  · Eccentric left ventricular hypertrophy.  · Indeterminate left ventricular diastolic function.  · Normal right ventricular systolic function.  · Mild aortic valve stenosis. peak velocity is 2.94 m/s; mean gradient is 20 mmHg. ABBIE cannot be measured accurately.  · Normal central venous pressure (3 mm Hg).  · Mild mitral regurgitation.  · No wall motion abnormalities.         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  Abnormal conglomerate of level II/III lymph nodes with necrotic centers with surrounding inflammatory stranding and numerous adjacent enlarged solid lymph nodes.  Constellation of findings concerning for neoplastic process such as metastatic squamous cell carcinoma.

## 2019-12-15 NOTE — ASSESSMENT & PLAN NOTE
- Now significantly improved/near resolved  - Continue aggressive SLP  - Some mild difficulty and decrease in speed when reading

## 2019-12-15 NOTE — SUBJECTIVE & OBJECTIVE
Neurologic Chief Complaint:  L MCA, expressive aphasia     Subjective:     Interval History: Patient is seen for follow-up neurological assessment and treatment recommendations:   Erythema pretty much resolved, some hardening remains of the LUE. Will continue Vanc x 2 more days. No other events overnight, pending placement at this time.     HPI, Past Medical, Family, and Social History remains the same as documented in the initial encounter.     Review of Systems   Constitutional: Negative for fatigue and fever.   HENT: Positive for mouth sores (reports occasional bleeding R tonsil). Negative for trouble swallowing.    Skin: Positive for color change and wound.   Neurological: Negative for facial asymmetry, speech difficulty, weakness, light-headedness and numbness.   Psychiatric/Behavioral: Negative for confusion and decreased concentration.     Scheduled Meds:   aspirin  81 mg Oral Daily    atorvastatin  40 mg Oral QHS    diclofenac sodium  2 g Topical (Top) BID    heparin (porcine)  5,000 Units Subcutaneous Q8H    lisinopril  10 mg Oral Daily    mupirocin   Topical (Top) BID    senna-docusate 8.6-50 mg  1 tablet Oral BID    vancomycin (VANCOCIN) IVPB  1,250 mg Intravenous Q12H     Continuous Infusions:  PRN Meds:acetaminophen, sodium chloride 0.9%, sodium chloride 0.9%    Objective:     Vital Signs (Most Recent):  Temp: 98 °F (36.7 °C) (12/15/19 0756)  Pulse: 73 (12/15/19 0756)  Resp: 18 (12/15/19 0756)  BP: 131/75 (12/15/19 0756)  SpO2: 95 % (12/15/19 0756)  BP Location: Left arm    Vital Signs Range (Last 24H):  Temp:  [97 °F (36.1 °C)-98.2 °F (36.8 °C)]   Pulse:  [58-85]   Resp:  [17-18]   BP: (131-159)/(70-83)   SpO2:  [94 %-97 %]   BP Location: Left arm    Physical Exam   Constitutional: He is oriented to person, place, and time. He appears well-developed. No distress.   HENT:   Head: Normocephalic and atraumatic.   Eyes: Conjunctivae and EOM are normal.   Cardiovascular: Normal rate.    Pulmonary/Chest: Effort normal. No respiratory distress.   Musculoskeletal: Normal range of motion. He exhibits no edema or deformity.   Neurological: He is alert and oriented to person, place, and time. No sensory deficit. He exhibits normal muscle tone.   Skin: Skin is warm and dry. He is not diaphoretic. There is erythema.   LUE erythema w/ significant improvement. Forearm hardening/cord still present, but also improved.    Psychiatric: He has a normal mood and affect. His behavior is normal.   Vitals reviewed.      Neurological Exam:   LOC: alert  Attention Span: Good   Language: No aphasia  Articulation: No dysarthria  Orientation: Person, Place, Time   Visual Fields: Full  EOM (CN III, IV, VI): Full/intact  Facial Movement (CN VII): Symmetric facial expression    Motor: Arm left  Normal 5/5  Leg left  Normal 5/5  Arm right  Normal 5/5  Leg right Normal 5/5  Sensation: Intact to light touch, temperature and vibration  Tone: Normal tone throughout    Laboratory:  CMP:   No results for input(s): GLUCOSE, CALCIUM, ALBUMIN, PROT, NA, K, CO2, CL, BUN, CREATININE, ALKPHOS, ALT, AST, BILITOT in the last 24 hours.  BMP:   Recent Labs   Lab 12/13/19  0351      K 4.2      CO2 25   BUN 14   CREATININE 0.8   CALCIUM 8.9     CBC:   Recent Labs   Lab 12/13/19  0351   WBC 7.74   RBC 4.17*   HGB 12.8*   HCT 38.1*      MCV 91   MCH 30.7   MCHC 33.6     Lipid Panel:   No results for input(s): CHOL, LDLCALC, HDL, TRIG in the last 168 hours.  Coagulation:   No results for input(s): PT, INR, APTT in the last 168 hours.  Platelet Aggregation Study: No results for input(s): PLTAGG, PLTAGINTERP, PLTAGREGLACO, ADPPLTAGGREG in the last 168 hours.  Hgb A1C:   No results for input(s): HGBA1C in the last 168 hours.  TSH:   No results for input(s): TSH in the last 168 hours.      Diagnostic Results     Brain imaging:      CT Head 12/8/19  1. Evolving left MCA distribution infarct with superimposed petechial hemorrhage  and mild mass effect resulting in minimal rightward midline shift of 1 mm.    MRI Brain 12/07/2019    1. Left MCA distribution infarct with hemorrhagic conversion in the insula and temporal lobe.  Associated localized mass effect without midline shift.  2. Chronic microvascular ischemic change.    CT Head 12/06/2019    Ill-defined regions of decreased attenuation left MCA territory specifically left insula, left posterior temporoparietal cortex and lateral aspect of the left lentiform nucleus most compatible with acute areas of infarction.  There is no significant mass effect or midline shift.  No evidence for hemorrhagic conversion.  There is slight hyperdensity associated with the intracranial vasculature likely related to recent contrast administration for outside institution angiography       Vessel Imaging     US UE Left 12/11/19  Partially occlusive thrombosis of the cephalic vein (superficial vein).  No evidence of deep venous thrombosis.    Cerebral Angiogram 12/6/19  Technically successful aspiration thrombectomy of the left MCA distal M1 segment artery with resulting TICI 2b flow.       Cardiac Evaluation:     TTE 12/6/19  · Normal left ventricular systolic function. The estimated ejection fraction is 65%  · Eccentric left ventricular hypertrophy.  · Indeterminate left ventricular diastolic function.  · Normal right ventricular systolic function.  · Mild aortic valve stenosis. peak velocity is 2.94 m/s; mean gradient is 20 mmHg. ABBIE cannot be measured accurately.  · Normal central venous pressure (3 mm Hg).  · Mild mitral regurgitation.  · No wall motion abnormalities.         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  Abnormal conglomerate of level II/III lymph nodes with necrotic centers with surrounding inflammatory stranding and numerous adjacent enlarged solid lymph nodes.  Constellation of findings concerning for  neoplastic process such as metastatic squamous cell carcinoma.

## 2019-12-15 NOTE — PROGRESS NOTES
[facility]  Vascular Neurology  Comprehensive Stroke Center  Progress Note    Assessment/Plan:     * Embolic stroke involving left middle cerebral artery  60 y.o. yo male with unknown past medical history who presented to [place] with AMS s/p fall. LKN ~1130 pm on 12/5. CTA completed at OSH revealed left MCA proximal occlusion. Patient transferred to [facility] for possible intervention; VAN +. Patient taken to IR and is now s/p successful thrombectomy with TICI 2b flow. Admitted to [facility] for close monitoring/higher level of care.     Noted Left MCA distribution infarct with small hemorrhagic conversion in the insula and temporal lobe on MRI Brain. Etiology ESUS at this time. Will plan for EP referral for loop recorder placement at discharge.    Patient progressing well; dispo outpatient therapy. Plans for half-way nursing home placement as patient does not have a safe discharge plan.   IV Abx for L arm cellulitis/thrombophlebitis that did not improve on PO Abx. Now that significantly better, can consider transition to PO Abx on 12/16      Antithrombotics for secondary stroke prevention: ASA 81mg QD  Statins for secondary stroke prevention and hyperlipidemia, if present:   Statins: Atorvastatin- 40 mg daily  Aggressive risk factor modification: HTN, HLD, Diet, Exercise, Obesity  Rehab efforts: The patient has been evaluated by a stroke team provider and the therapy needs have been fully considered based off the presenting complaints and exam findings. The following therapy evaluations are needed: PT evaluate and treat, OT evaluate and treat, SLP evaluate and treat, PM&R evaluate for appropriate placement- Dispo outpatient therapy however electing for half-way nursing home placement  Diagnostics ordered/pending: none  VTE prophylaxis: Heparin 5000 units SQ every 8 hours; SCDs  BP parameters: Infarct: Post successful thrombectomy, SBP < 160    Cellulitis  - LUE with erythema, tenderness, and firmness  to palpation - prior IV site.  - US LUE 12/12 showed partially occlusive thrombosis of the cephalic vein (superficial vein); no DVT  - Initial treatment w/ Bactrim did not result in significant improvement  - Transitioned to Vanc and on Day #3 patient w/ improved symptoms   - Can complete 5 day course, or switch to bactrim on 12/16 if desired     Oropharyngeal mass  - Pt reports hx R neck/inferior to jaw mass present for the last year or so; denies prior evaluations. He denies hx chewing tobacco abuse. Reports occasional R tonsillar bleeding as well.  - CT Soft Tissue Neck demonstrating mass with multiple adjacent necrotic and large, solid lymph nodes. Concern for neoplastic process. Discussed these findings with patient.  - Curbside to ENT - No plans for acute intervention; outpatient workup. Will order Ambulatory referral to ENT with Dr. Palmer at discharge    Essential hypertension  - Stroke risk factor  - SBP < 160  - BP stable on current regimen     Aphasia due to acute cerebrovascular accident (CVA)  - Now significantly improved/near resolved  - Continue aggressive SLP  - Some mild difficulty and decrease in speed when reading     Cytotoxic cerebral edema  - Area of cytotoxic cerebral edema identified when reviewing brain imaging in the territory of the left middle cerebral artery. There (is/is not) mass effect associated with it. We will continue to monitor the patients clinical exam for any worsening of symptoms which may indicate expansion of the stroke or the area of the edema resulting in the clinical change  - The pattern is suggestive of ESUS etiology    Mixed hyperlipidemia  - Stroke risk factor   -   - Continue Atorvastatin 40 mg         12/08/2019 improvement of expressive aphasia today   12/09/2019: NAEON. BP elevated overnight. Coreg discontinued. Lisinopril started.   12/10/2019: Patient progressing well. PT/OT now recommending home with outpatient therapy. Patient is homeless. CM/SW  working to plan safe discharge.   12/11: CT Soft Tissue Neck demonstrating mass; curbside to ENT. Plans for skilled nursing nursing home placement as patient does not have a safe discharge plan.   12/12 - Started on Bactrim DS X 5 days for LUE cellulitis. CM/SW arranging follow up at [place] for evaluation of neck mass.   12/13: LUE with worsened pain, firmness and spreading erythema; d/c'd Bactrim and started IV Vanc with pharmacy assistance. Labs qOD.  12/14/2019Improvement in pain and appearance of LUE. Patient doing well and pending discharge to NH.   12/15/2019 Erythema pretty much resolved, some hardening remains of the LUE. Will continue Vanc x 2 more days. No other events overnight, pending placement at this time.         STROKE DOCUMENTATION   Acute Stroke Times   Last Known Normal Date: 12/05/19  Last Known Normal Time: 2330  Stroke Team Called Date: 12/06/19  Stroke Team Called Time: 0800  Stroke Team Arrival Date: 12/06/19  Stroke Team Arrival Time: 0804  CT Interpretation Time: 0810  Decision to Treat Time for Alteplase: (N/A)    NIH Scale:  1a. Level of Consciousness: 0-->Alert, keenly responsive  1b. LOC Questions: 0-->Answers both questions correctly  1c. LOC Commands: 0-->Performs both tasks correctly  2. Best Gaze: 0-->Normal  3. Visual: 0-->No visual loss  4. Facial Palsy: 0-->Normal symmetrical movements  5a. Motor Arm, Left: 0-->No drift, limb holds 90 (or 45) degrees for full 10 secs  5b. Motor Arm, Right: 0-->No drift, limb holds 90 (or 45) degrees for full 10 secs  6a. Motor Leg, Left: 0-->No drift, leg holds 30 degree position for full 5 secs  6b. Motor Leg, Right: 0-->No drift, leg holds 30 degree position for full 5 secs  7. Limb Ataxia: 0-->Absent  8. Sensory: 0-->Normal, no sensory loss  9. Best Language: 0-->No aphasia, normal  10. Dysarthria: 0-->Normal  11. Extinction and Inattention (formerly Neglect): 0-->No abnormality  Total (NIH Stroke Scale): 0          Modified [place] :1  [place]  Coma Scale:    ABCD2 Score:    [id number] Score:   HAS -BLED Score:   ICH Score:   Hunt & Cook Classification:      Hemorrhagic change of an Ischemic Stroke: Does this patient have an ischemic stroke with hemorrhagic changes? No     Neurologic Chief Complaint:  L MCA, expressive aphasia     Subjective:     Interval History: Patient is seen for follow-up neurological assessment and treatment recommendations:   Erythema pretty much resolved, some hardening remains of the LUE. Will continue Vanc x 2 more days. No other events overnight, pending placement at this time.     HPI, Past Medical, Family, and Social History remains the same as documented in the initial encounter.     Review of Systems   Constitutional: Negative for fatigue and fever.   HENT: Positive for mouth sores (reports occasional bleeding R tonsil). Negative for trouble swallowing.    Skin: Positive for color change and wound.   Neurological: Negative for facial asymmetry, speech difficulty, weakness, light-headedness and numbness.   Psychiatric/Behavioral: Negative for confusion and decreased concentration.     Scheduled Meds:   aspirin  81 mg Oral Daily    atorvastatin  40 mg Oral QHS    diclofenac sodium  2 g Topical (Top) BID    heparin (porcine)  5,000 Units Subcutaneous Q8H    lisinopril  10 mg Oral Daily    mupirocin   Topical (Top) BID    senna-docusate 8.6-50 mg  1 tablet Oral BID    vancomycin (VANCOCIN) IVPB  1,250 mg Intravenous Q12H     Continuous Infusions:  PRN Meds:acetaminophen, sodium chloride 0.9%, sodium chloride 0.9%    Objective:     Vital Signs (Most Recent):  Temp: 98 °F (36.7 °C) (12/15/19 0756)  Pulse: 73 (12/15/19 0756)  Resp: 18 (12/15/19 0756)  BP: 131/75 (12/15/19 0756)  SpO2: 95 % (12/15/19 0756)  BP Location: Left arm    Vital Signs Range (Last 24H):  Temp:  [97 °F (36.1 °C)-98.2 °F (36.8 °C)]   Pulse:  [58-85]   Resp:  [17-18]   BP: (131-159)/(70-83)   SpO2:  [94 %-97 %]   BP Location: Left arm    Physical Exam    Constitutional: He is oriented to person, place, and time. He appears well-developed. No distress.   HENT:   Head: Normocephalic and atraumatic.   Eyes: Conjunctivae and EOM are normal.   Cardiovascular: Normal rate.   Pulmonary/Chest: Effort normal. No respiratory distress.   Musculoskeletal: Normal range of motion. He exhibits no edema or deformity.   Neurological: He is alert and oriented to person, place, and time. No sensory deficit. He exhibits normal muscle tone.   Skin: Skin is warm and dry. He is not diaphoretic. There is erythema.   LUE erythema w/ significant improvement. Forearm hardening/cord still present, but also improved.    Psychiatric: He has a normal mood and affect. His behavior is normal.   Vitals reviewed.      Neurological Exam:   LOC: alert  Attention Span: Good   Language: No aphasia  Articulation: No dysarthria  Orientation: Person, Place, Time   Visual Fields: Full  EOM (CN III, IV, VI): Full/intact  Facial Movement (CN VII): Symmetric facial expression    Motor: Arm left  Normal 5/5  Leg left  Normal 5/5  Arm right  Normal 5/5  Leg right Normal 5/5  Sensation: Intact to light touch, temperature and vibration  Tone: Normal tone throughout    Laboratory:  CMP:   No results for input(s): GLUCOSE, CALCIUM, ALBUMIN, PROT, NA, K, CO2, CL, BUN, CREATININE, ALKPHOS, ALT, AST, BILITOT in the last 24 hours.  BMP:   Recent Labs   Lab 12/13/19  0351      K 4.2      CO2 25   BUN 14   CREATININE 0.8   CALCIUM 8.9     CBC:   Recent Labs   Lab 12/13/19  0351   WBC 7.74   RBC 4.17*   HGB 12.8*   HCT 38.1*      MCV 91   MCH 30.7   MCHC 33.6     Lipid Panel:   No results for input(s): CHOL, LDLCALC, HDL, TRIG in the last 168 hours.  Coagulation:   No results for input(s): PT, INR, APTT in the last 168 hours.  Platelet Aggregation Study: No results for input(s): PLTAGG, PLTAGINTERP, PLTAGREGLACO, ADPPLTAGGREG in the last 168 hours.  Hgb A1C:   No results for input(s): HGBA1C in the last  168 hours.  TSH:   No results for input(s): TSH in the last 168 hours.      Diagnostic Results     Brain imaging:      CT Head 12/8/19  1. Evolving left MCA distribution infarct with superimposed petechial hemorrhage and mild mass effect resulting in minimal rightward midline shift of 1 mm.    MRI Brain 12/07/2019    1. Left MCA distribution infarct with hemorrhagic conversion in the insula and temporal lobe.  Associated localized mass effect without midline shift.  2. Chronic microvascular ischemic change.    CT Head 12/06/2019    Ill-defined regions of decreased attenuation left MCA territory specifically left insula, left posterior temporoparietal cortex and lateral aspect of the left lentiform nucleus most compatible with acute areas of infarction.  There is no significant mass effect or midline shift.  No evidence for hemorrhagic conversion.  There is slight hyperdensity associated with the intracranial vasculature likely related to recent contrast administration for outside institution angiography       Vessel Imaging     US UE Left 12/11/19  Partially occlusive thrombosis of the cephalic vein (superficial vein).  No evidence of deep venous thrombosis.    Cerebral Angiogram 12/6/19  Technically successful aspiration thrombectomy of the left MCA distal M1 segment artery with resulting TICI 2b flow.       Cardiac Evaluation:     TTE 12/6/19  · Normal left ventricular systolic function. The estimated ejection fraction is 65%  · Eccentric left ventricular hypertrophy.  · Indeterminate left ventricular diastolic function.  · Normal right ventricular systolic function.  · Mild aortic valve stenosis. peak velocity is 2.94 m/s; mean gradient is 20 mmHg. ABBIE cannot be measured accurately.  · Normal central venous pressure (3 mm Hg).  · Mild mitral regurgitation.  · No wall motion abnormalities.         Miscellaneous Imaging:    CT Soft Tissue Neck 12/10/19  Right oropharyngeal mass which appears to involve the soft  palate.  Abnormal conglomerate of level II/III lymph nodes with necrotic centers with surrounding inflammatory stranding and numerous adjacent enlarged solid lymph nodes.  Constellation of findings concerning for neoplastic process such as metastatic squamous cell carcinoma.      Lorena Horvath MD  [facility]  Department of Vascular Neurology   [facility]

## 2019-12-15 NOTE — ASSESSMENT & PLAN NOTE
60 y.o. yo male with unknown past medical history who presented to [place] with AMS s/p fall. LKN ~1130 pm on 12/5. CTA completed at OSH revealed left MCA proximal occlusion. Patient transferred to [facility] for possible intervention; VAN +. Patient taken to IR and is now s/p successful thrombectomy with TICI 2b flow. Admitted to [facility] for close monitoring/higher level of care.     Noted Left MCA distribution infarct with small hemorrhagic conversion in the insula and temporal lobe on MRI Brain. Etiology ESUS at this time. Will plan for EP referral for loop recorder placement at discharge.    Patient progressing well; dispo outpatient therapy. Plans for [facility] nursing home placement as patient does not have a safe discharge plan.   IV Abx for L arm cellulitis/thrombophlebitis that did not improve on PO Abx      Antithrombotics for secondary stroke prevention: ASA 81mg QD  Statins for secondary stroke prevention and hyperlipidemia, if present:   Statins: Atorvastatin- 40 mg daily  Aggressive risk factor modification: HTN, HLD, Diet, Exercise, Obesity  Rehab efforts: The patient has been evaluated by a stroke team provider and the therapy needs have been fully considered based off the presenting complaints and exam findings. The following therapy evaluations are needed: PT evaluate and treat, OT evaluate and treat, SLP evaluate and treat, PM&R evaluate for appropriate placement- Dispo outpatient therapy however electing for [facility] nursing home placement  Diagnostics ordered/pending: none  VTE prophylaxis: Heparin 5000 units SQ every 8 hours; SCDs  BP parameters: Infarct: Post successful thrombectomy, SBP < 160

## 2019-12-15 NOTE — PLAN OF CARE
Pt slept throughout evening.  No complaints of pain.  No neurological deficits noted.    Pt SR on telemetry.    Vanc troph 9.8.

## 2019-12-15 NOTE — PLAN OF CARE
Problem: Hemodynamic Instability (Stroke, Ischemic/Transient Ischemic Attack)  Goal: Vital Signs Remain in Desired Range  Outcome: Ongoing, Progressing     Problem: Adult Inpatient Plan of Care  Goal: Plan of Care Review  Outcome: Ongoing, Progressing

## 2019-12-15 NOTE — ASSESSMENT & PLAN NOTE
60 y.o. yo male with unknown past medical history who presented to [place] with AMS s/p fall. LKN ~1130 pm on 12/5. CTA completed at OSH revealed left MCA proximal occlusion. [place] [facility] [place]; VAN +. Patient taken to IR and is now s/p successful thrombectomy with TICI 2b flow. Admitted to [facility] for close monitoring/higher level of care.     Noted Left MCA distribution infarct with small hemorrhagic conversion in the insula and temporal lobe on MRI Brain. Etiology ESUS at this time. Will plan for EP referral for loop recorder placement at discharge.    Patient progressing well; dispo outpatient therapy. Plans for nursing home nursing home placement as patient does not have a safe discharge plan.   IV Abx for L arm cellulitis/thrombophlebitis that did not improve on PO Abx. Now that significantly better, can consider transition to PO Abx on 12/16      Antithrombotics for secondary stroke prevention: ASA 81mg QD  Statins for secondary stroke prevention and hyperlipidemia, if present:   Statins: Atorvastatin- 40 mg daily  Aggressive risk factor modification: HTN, HLD, Diet, Exercise, Obesity  Rehab efforts: The patient has been evaluated by a stroke team provider and the therapy needs have been fully considered based off the presenting complaints and exam findings. The following therapy evaluations are needed: PT evaluate and treat, OT evaluate and treat, SLP evaluate and treat, PM&R evaluate for appropriate placement- Dispo outpatient therapy however electing for nursing home nursing home placement  Diagnostics ordered/pending: none  VTE prophylaxis: Heparin 5000 units SQ every 8 hours; SCDs  BP parameters: Infarct: Post successful thrombectomy, SBP < 160

## 2019-12-16 LAB
ANION GAP SERPL CALC-SCNC: 8 MMOL/L (ref 8–16)
BASOPHILS # BLD AUTO: 0.11 K/UL (ref 0–0.2)
BASOPHILS NFR BLD: 1.6 % (ref 0–1.9)
BUN SERPL-MCNC: 14 MG/DL (ref 6–20)
CALCIUM SERPL-MCNC: 9.1 MG/DL (ref 8.7–10.5)
CHLORIDE SERPL-SCNC: 105 MMOL/L (ref 95–110)
CO2 SERPL-SCNC: 27 MMOL/L (ref 23–29)
CREAT SERPL-MCNC: 0.8 MG/DL (ref 0.5–1.4)
DIFFERENTIAL METHOD: ABNORMAL
EOSINOPHIL # BLD AUTO: 0.2 K/UL (ref 0–0.5)
EOSINOPHIL NFR BLD: 2.7 % (ref 0–8)
ERYTHROCYTE [DISTWIDTH] IN BLOOD BY AUTOMATED COUNT: 13.2 % (ref 11.5–14.5)
EST. GFR  (AFRICAN AMERICAN): >60 ML/MIN/1.73 M^2
EST. GFR  (NON AFRICAN AMERICAN): >60 ML/MIN/1.73 M^2
GLUCOSE SERPL-MCNC: 84 MG/DL (ref 70–110)
HCT VFR BLD AUTO: 38.7 % (ref 40–54)
HGB BLD-MCNC: 12.8 G/DL (ref 14–18)
IMM GRANULOCYTES # BLD AUTO: 0.03 K/UL (ref 0–0.04)
IMM GRANULOCYTES NFR BLD AUTO: 0.4 % (ref 0–0.5)
LYMPHOCYTES # BLD AUTO: 2.4 K/UL (ref 1–4.8)
LYMPHOCYTES NFR BLD: 34.4 % (ref 18–48)
MAGNESIUM SERPL-MCNC: 2 MG/DL (ref 1.6–2.6)
MCH RBC QN AUTO: 30.2 PG (ref 27–31)
MCHC RBC AUTO-ENTMCNC: 33.1 G/DL (ref 32–36)
MCV RBC AUTO: 91 FL (ref 82–98)
MONOCYTES # BLD AUTO: 0.6 K/UL (ref 0.3–1)
MONOCYTES NFR BLD: 8.6 % (ref 4–15)
NEUTROPHILS # BLD AUTO: 3.7 K/UL (ref 1.8–7.7)
NEUTROPHILS NFR BLD: 52.3 % (ref 38–73)
NRBC BLD-RTO: 0 /100 WBC
PHOSPHATE SERPL-MCNC: 3.4 MG/DL (ref 2.7–4.5)
PLATELET # BLD AUTO: 316 K/UL (ref 150–350)
PMV BLD AUTO: 10.4 FL (ref 9.2–12.9)
POTASSIUM SERPL-SCNC: 4.2 MMOL/L (ref 3.5–5.1)
RBC # BLD AUTO: 4.24 M/UL (ref 4.6–6.2)
SODIUM SERPL-SCNC: 140 MMOL/L (ref 136–145)
WBC # BLD AUTO: 7.06 K/UL (ref 3.9–12.7)

## 2019-12-16 PROCEDURE — 80048 BASIC METABOLIC PNL TOTAL CA: CPT

## 2019-12-16 PROCEDURE — 97803 MED NUTRITION INDIV SUBSEQ: CPT

## 2019-12-16 PROCEDURE — 36415 COLL VENOUS BLD VENIPUNCTURE: CPT

## 2019-12-16 PROCEDURE — 25000003 PHARM REV CODE 250: Performed by: STUDENT IN AN ORGANIZED HEALTH CARE EDUCATION/TRAINING PROGRAM

## 2019-12-16 PROCEDURE — 85025 COMPLETE CBC W/AUTO DIFF WBC: CPT

## 2019-12-16 PROCEDURE — 84100 ASSAY OF PHOSPHORUS: CPT

## 2019-12-16 PROCEDURE — 99233 SBSQ HOSP IP/OBS HIGH 50: CPT | Mod: ,,, | Performed by: PSYCHIATRY & NEUROLOGY

## 2019-12-16 PROCEDURE — 63600175 PHARM REV CODE 636 W HCPCS: Performed by: PSYCHIATRY & NEUROLOGY

## 2019-12-16 PROCEDURE — 92507 TX SP LANG VOICE COMM INDIV: CPT

## 2019-12-16 PROCEDURE — 25000003 PHARM REV CODE 250: Performed by: PSYCHIATRY & NEUROLOGY

## 2019-12-16 PROCEDURE — 11000001 HC ACUTE MED/SURG PRIVATE ROOM

## 2019-12-16 PROCEDURE — 83735 ASSAY OF MAGNESIUM: CPT

## 2019-12-16 PROCEDURE — 99233 PR SUBSEQUENT HOSPITAL CARE,LEVL III: ICD-10-PCS | Mod: ,,, | Performed by: PSYCHIATRY & NEUROLOGY

## 2019-12-16 PROCEDURE — 25000003 PHARM REV CODE 250: Performed by: NURSE PRACTITIONER

## 2019-12-16 PROCEDURE — 63600175 PHARM REV CODE 636 W HCPCS: Performed by: NURSE PRACTITIONER

## 2019-12-16 RX ADMIN — VANCOMYCIN HYDROCHLORIDE 1500 MG: 1.5 INJECTION, POWDER, LYOPHILIZED, FOR SOLUTION INTRAVENOUS at 09:12

## 2019-12-16 RX ADMIN — ATORVASTATIN CALCIUM 40 MG: 20 TABLET, FILM COATED ORAL at 09:12

## 2019-12-16 RX ADMIN — HEPARIN SODIUM 5000 UNITS: 5000 INJECTION INTRAVENOUS; SUBCUTANEOUS at 02:12

## 2019-12-16 RX ADMIN — DICLOFENAC 2 G: 10 GEL TOPICAL at 09:12

## 2019-12-16 RX ADMIN — MUPIROCIN: 20 OINTMENT TOPICAL at 09:12

## 2019-12-16 RX ADMIN — HEPARIN SODIUM 5000 UNITS: 5000 INJECTION INTRAVENOUS; SUBCUTANEOUS at 09:12

## 2019-12-16 RX ADMIN — ASPIRIN 81 MG: 81 TABLET, COATED ORAL at 09:12

## 2019-12-16 RX ADMIN — LISINOPRIL 10 MG: 5 TABLET ORAL at 09:12

## 2019-12-16 RX ADMIN — HEPARIN SODIUM 5000 UNITS: 5000 INJECTION INTRAVENOUS; SUBCUTANEOUS at 06:12

## 2019-12-16 RX ADMIN — SENNOSIDES AND DOCUSATE SODIUM 1 TABLET: 8.6; 5 TABLET ORAL at 09:12

## 2019-12-16 NOTE — PROGRESS NOTES
"[facility]  Adult Nutrition  Progress Note    SUMMARY       Recommendations    1. Continue regular diet as tolerated.  Goals: 1. Pt's intake >75% x 7 days.  Nutrition Goal Status: goal met  Communication of RD Recs: (POC)    Reason for Assessment    Reason For Assessment: RD follow-up  Diagnosis: stroke/CVA  Relevant Medical History: None  Interdisciplinary Rounds: did not attend  General Information Comments: Pt advanced to regular diet with excellent intake. Pt reports good po intake and stable wt PTA, appears nourished, NFPE not warranted. Awaiting NH placement.  Nutrition Discharge Planning: Pt to follow heart healthy diet.    Nutrition Risk Screen    Nutrition Risk Screen: no indicators present    Nutrition/Diet History    Spiritual, Cultural Beliefs, Muslim Practices, Values that Affect Care: no    Anthropometrics    Temp: 97.6 °F (36.4 °C)  Height Method: Stated  Height: 5' 8" (172.7 cm)  Height (inches): 68 in  Weight Method: Bed Scale  Weight: 97.1 kg (214 lb 1.1 oz)  Weight (lb): 214.07 lb  Ideal Body Weight (IBW), Male: 154 lb  % Ideal Body Weight, Male (lb): 139.01 lb  BMI (Calculated): 32.6       Lab/Procedures/Meds    Pertinent Labs Reviewed: reviewed  Pertinent Labs Comments: A1c 5.7%  Pertinent Medications Reviewed: reviewed  Pertinent Medications Comments: statin      Estimated/Assessed Needs    Weight Used For Calorie Calculations: 91.1 kg (200 lb 13.4 oz)  Energy Calorie Requirements (kcal): 2041 kcal  Energy Need Method: Will-St Jeor(PAL 1.20)  Protein Requirements: 82-100g/day  Weight Used For Protein Calculations: 91.1 kg (200 lb 13.4 oz)        RDA Method (mL): 2041         Nutrition Prescription Ordered    Current Diet Order: Regular    Evaluation of Received Nutrient/Fluid Intake    IV Fluid (mL): 500  I/O: -2.4L since admission  Comments: LBM 12/16  % Intake of Estimated Energy Needs: 75 - 100 %  % Meal Intake: 75 - 100 %    Nutrition Risk    Level of " Risk/Frequency of Follow-up: low     Assessment and Plan    No nutrition diagnosis at this time.     Monitor and Evaluation    Food and Nutrient Intake: energy intake, food and beverage intake  Food and Nutrient Adminstration: diet order  Anthropometric Measurements: weight, weight change, body mass index  Biochemical Data, Medical Tests and Procedures: electrolyte and renal panel, gastrointestinal profile, glucose/endocrine profile, inflammatory profile, lipid profile  Nutrition-Focused Physical Findings: overall appearance     Malnutrition Assessment     Pt does not meet criteria for malnutrition at this time.    Nutrition Follow-Up    RD Follow-up?: Yes

## 2019-12-16 NOTE — PLAN OF CARE
The patient has had no issues during today's shift. All vitals have been stable and he is ambulating without any assistance. He complains of no pain or discomfort. TM.

## 2019-12-16 NOTE — PLAN OF CARE
Awaiting on an accepting facility.      12/16/19 1459   Discharge Reassessment   Assessment Type Discharge Planning Reassessment   Discharge Plan A New Nursing Home placement - [facility] care facility   Discharge Plan B Rehab   Anticipated Discharge Disposition group home Nu   Post-Acute Status   Post-Acute Authorization Placement   Post-Acute Placement Status Referrals Sent

## 2019-12-16 NOTE — PLAN OF CARE
12/16/19 1119   Post-Acute Status   Post-Acute Authorization Placement   Post-Acute Placement Status Referrals Sent       Called [facility]ero[facility] group and danuta hernandez  for admission for this patient. SW in contact with CM and Medical staff. Will continue to follow and offer support as needed.     Javy Montes De Oca, JACKIE  [facility]   Ext. [phone]

## 2019-12-16 NOTE — SUBJECTIVE & OBJECTIVE
Neurologic Chief Complaint:  L MCA, expressive aphasia     Subjective:     Interval History: Patient is seen for follow-up neurological assessment and treatment recommendations:     Continuing on IV Vanc. Erythema remarkably improved. Waiting on acceptance to assisted nursing home.     HPI, Past Medical, Family, and Social History remains the same as documented in the initial encounter.     Review of Systems   Constitutional: Negative for fatigue and fever.   HENT: Positive for mouth sores (reports occasional bleeding R tonsil). Negative for trouble swallowing.    Skin: Positive for color change and wound.   Neurological: Negative for facial asymmetry, speech difficulty, weakness, light-headedness and numbness.   Psychiatric/Behavioral: Negative for confusion and decreased concentration.     Scheduled Meds:   aspirin  81 mg Oral Daily    atorvastatin  40 mg Oral QHS    diclofenac sodium  2 g Topical (Top) BID    heparin (porcine)  5,000 Units Subcutaneous Q8H    lisinopril  10 mg Oral Daily    mupirocin   Topical (Top) BID    senna-docusate 8.6-50 mg  1 tablet Oral BID    vancomycin (VANCOCIN) IVPB  1,500 mg Intravenous Q12H     Continuous Infusions:  PRN Meds:acetaminophen, sodium chloride 0.9%, sodium chloride 0.9%    Objective:     Vital Signs (Most Recent):  Temp: 97.9 °F (36.6 °C) (12/16/19 0805)  Pulse: 72 (12/16/19 0805)  Resp: 16 (12/16/19 0805)  BP: (!) 146/80 (12/16/19 0805)  SpO2: (!) 92 % (12/16/19 0805)  BP Location: Left arm    Vital Signs Range (Last 24H):  Temp:  [97.6 °F (36.4 °C)-98.4 °F (36.9 °C)]   Pulse:  [61-72]   Resp:  [16-18]   BP: (139-149)/(76-80)   SpO2:  [92 %-97 %]   BP Location: Left arm    Physical Exam   Constitutional: He is oriented to person, place, and time. He appears well-developed. No distress.   HENT:   Head: Normocephalic and atraumatic.   Eyes: Conjunctivae and EOM are normal.   Cardiovascular: Normal rate.   Pulmonary/Chest: Effort normal. No respiratory distress.    Musculoskeletal: Normal range of motion. He exhibits no edema or deformity.   Neurological: He is alert and oriented to person, place, and time. No sensory deficit. He exhibits normal muscle tone.   Skin: Skin is warm and dry. He is not diaphoretic. There is erythema.   LUE erythema w/ significant improvement. Forearm hardening/cord still present, but also improved.    Psychiatric: He has a normal mood and affect. His behavior is normal.   Vitals reviewed.      Neurological Exam:   LOC: alert  Attention Span: Good   Language: No aphasia  Articulation: No dysarthria  Orientation: Person, Place, Time   Visual Fields: Full  EOM (CN III, IV, VI): Full/intact  Facial Movement (CN VII): Symmetric facial expression    Motor: Arm left  Normal 5/5  Leg left  Normal 5/5  Arm right  Normal 5/5  Leg right Normal 5/5  Sensation: Intact to light touch, temperature and vibration  Tone: Normal tone throughout    Laboratory:  CMP:   Recent Labs   Lab 12/16/19  0558   CALCIUM 9.1      K 4.2   CO2 27      BUN 14   CREATININE 0.8     BMP:   Recent Labs   Lab 12/16/19  0558      K 4.2      CO2 27   BUN 14   CREATININE 0.8   CALCIUM 9.1     CBC:   Recent Labs   Lab 12/16/19  0558   WBC 7.06   RBC 4.24*   HGB 12.8*   HCT 38.7*      MCV 91   MCH 30.2   MCHC 33.1     Lipid Panel:   No results for input(s): CHOL, LDLCALC, HDL, TRIG in the last 168 hours.  Coagulation:   No results for input(s): PT, INR, APTT in the last 168 hours.  Platelet Aggregation Study: No results for input(s): PLTAGG, PLTAGINTERP, PLTAGREGLACO, ADPPLTAGGREG in the last 168 hours.  Hgb A1C:   No results for input(s): HGBA1C in the last 168 hours.  TSH:   No results for input(s): TSH in the last 168 hours.      Diagnostic Results     Brain imaging:      CT Head 12/8/19  1. Evolving left MCA distribution infarct with superimposed petechial hemorrhage and mild mass effect resulting in minimal rightward midline shift of 1 mm.    MRI Brain  12/07/2019    1. Left MCA distribution infarct with hemorrhagic conversion in the insula and temporal lobe.  Associated localized mass effect without midline shift.  2. Chronic microvascular ischemic change.    CT Head 12/06/2019    Ill-defined regions of decreased attenuation left MCA territory specifically left insula, left posterior temporoparietal cortex and lateral aspect of the left lentiform nucleus most compatible with acute areas of infarction.  There is no significant mass effect or midline shift.  No evidence for hemorrhagic conversion.  There is slight hyperdensity associated with the intracranial vasculature likely related to recent contrast administration for outside institution angiography       Vessel Imaging     US UE Left 12/11/19  Partially occlusive thrombosis of the cephalic vein (superficial vein).  No evidence of deep venous thrombosis.    Cerebral Angiogram 12/6/19  Technically successful aspiration thrombectomy of the left MCA distal M1 segment artery with resulting TICI 2b flow.       Cardiac Evaluation:     TTE 12/6/19  · Normal left ventricular systolic function. The estimated ejection fraction is 65%  · Eccentric left ventricular hypertrophy.  · Indeterminate left ventricular diastolic function.  · Normal right ventricular systolic function.  · Mild aortic valve stenosis. peak velocity is 2.94 m/s; mean gradient is 20 mmHg. ABBIE cannot be measured accurately.  · Normal central venous pressure (3 mm Hg).  · Mild mitral regurgitation.  · No wall motion abnormalities.         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  Abnormal conglomerate of level II/III lymph nodes with necrotic centers with surrounding inflammatory stranding and numerous adjacent enlarged solid lymph nodes.  Constellation of findings concerning for neoplastic process such as metastatic squamous cell carcinoma.   (4) rarely moist

## 2019-12-16 NOTE — PROGRESS NOTES
[facility]  Vascular Neurology  Comprehensive Stroke Center  Progress Note    Assessment/Plan:     * Embolic stroke involving left middle cerebral artery  60 y.o. yo male with unknown past medical history who presented to [place] with AMS s/p fall. LKN ~1130 pm on 12/5. CTA completed at OSH revealed left MCA proximal occlusion. Patient transferred to [facility] for possible intervention; VAN +. Patient taken to IR and is now s/p successful thrombectomy with TICI 2b flow. Admitted to [facility] for close monitoring/higher level of care.     Noted Left MCA distribution infarct with small hemorrhagic conversion in the insula and temporal lobe on MRI Brain. Etiology ESUS at this time. Will plan for EP referral for loop recorder placement at discharge.    Patient progressing well; dispo outpatient therapy. Plans for nursing home nursing home placement as patient does not have a safe discharge plan.   IV Abx for L arm cellulitis/thrombophlebitis that did not improve on PO Abx. Now that significantly better, patient to complete 5 day course on 12/17    Antithrombotics for secondary stroke prevention: ASA 81mg QD  Statins for secondary stroke prevention and hyperlipidemia, if present:   Statins: Atorvastatin- 40 mg daily  Aggressive risk factor modification: HTN, HLD, Diet, Exercise, Obesity  Rehab efforts: The patient has been evaluated by a stroke team provider and the therapy needs have been fully considered based off the presenting complaints and exam findings. The following therapy evaluations are needed: PT evaluate and treat, OT evaluate and treat, SLP evaluate and treat, PM&R evaluate for appropriate placement- Dispo outpatient therapy however electing for nursing home nursing home placement  Diagnostics ordered/pending: none  VTE prophylaxis: Heparin 5000 units SQ every 8 hours; SCDs  BP parameters: Infarct: Post successful thrombectomy, SBP < 160    Cellulitis  - LUE with erythema, tenderness, and firmness to  palpation - prior IV site.  - US LUE 12/12 showed partially occlusive thrombosis of the cephalic vein (superficial vein); no DVT  - Initial treatment w/ Bactrim did not result in significant improvement  - Transitioned to Vanc and on Day #4 patient w/ improved symptoms   - Patient to complete 5 day course on 12/17    Oropharyngeal mass  - Pt reports hx R neck/inferior to jaw mass present for the last year or so; denies prior evaluations. He denies hx chewing tobacco abuse. Reports occasional R tonsillar bleeding as well.  - CT Soft Tissue Neck demonstrating mass with multiple adjacent necrotic and large, solid lymph nodes. Concern for neoplastic process. Discussed these findings with patient.  - Curbside to ENT - No plans for acute intervention; outpatient workup. Will order Ambulatory referral to ENT with Dr. Palmer at discharge    Essential hypertension  - Stroke risk factor  - SBP < 160  - BP stable on current regimen     Aphasia due to acute cerebrovascular accident (CVA)  - Now significantly improved/near resolved  - Continue aggressive SLP  - Some mild difficulty and decrease in speed when reading     Cytotoxic cerebral edema  - Area of cytotoxic cerebral edema identified when reviewing brain imaging in the territory of the left middle cerebral artery. There (is/is not) mass effect associated with it. We will continue to monitor the patients clinical exam for any worsening of symptoms which may indicate expansion of the stroke or the area of the edema resulting in the clinical change  - The pattern is suggestive of ESUS etiology    Mixed hyperlipidemia  - Stroke risk factor   -   - Continue Atorvastatin 40 mg         12/08/2019 improvement of expressive aphasia today   12/09/2019: NAEON. BP elevated overnight. Coreg discontinued. Lisinopril started.   12/10/2019: Patient progressing well. PT/OT now recommending home with outpatient therapy. Patient is homeless. CM/SW working to plan safe discharge.    12/11: CT Soft Tissue Neck demonstrating mass; curbside to ENT. Plans for [facility] nursing home placement as patient does not have a safe discharge plan.   12/12 - Started on Bactrim DS X 5 days for LUE cellulitis. CM/SW arranging follow up at [facility] for evaluation of neck mass.   12/13: LUE with worsened pain, firmness and spreading erythema; d/c'd Bactrim and started IV Vanc with pharmacy assistance. Labs qOD.  12/14/2019Improvement in pain and appearance of LUE. Patient doing well and pending discharge to NH.   12/15/2019 Erythema pretty much resolved, some hardening remains of the LUE. Will continue Vanc x 2 more days. No other events overnight, pending placement at this time.   12/16 - Continuing on IV Vanc. Erythema remarkably improved. Waiting on acceptance to [facility] nursing home.     STROKE DOCUMENTATION   Acute Stroke Times   Last Known Normal Date: 12/05/19  Last Known Normal Time: 2330  Stroke Team Called Date: 12/06/19  Stroke Team Called Time: 0800  Stroke Team Arrival Date: 12/06/19  Stroke Team Arrival Time: 0804  CT Interpretation Time: 0810  Decision to Treat Time for Alteplase: (N/A)    NIH Scale:  1a. Level of Consciousness: 0-->Alert, keenly responsive  1b. LOC Questions: 0-->Answers both questions correctly  1c. LOC Commands: 0-->Performs both tasks correctly  2. Best Gaze: 0-->Normal  3. Visual: 0-->No visual loss  4. Facial Palsy: 0-->Normal symmetrical movements  5a. Motor Arm, Left: 0-->No drift, limb holds 90 (or 45) degrees for full 10 secs  5b. Motor Arm, Right: 0-->No drift, limb holds 90 (or 45) degrees for full 10 secs  6a. Motor Leg, Left: 0-->No drift, leg holds 30 degree position for full 5 secs  6b. Motor Leg, Right: 0-->No drift, leg holds 30 degree position for full 5 secs  7. Limb Ataxia: 0-->Absent  8. Sensory: 0-->Normal, no sensory loss  9. Best Language: 0-->No aphasia, normal  10. Dysarthria: 0-->Normal  11. Extinction and Inattention (formerly Neglect): 0-->No  abnormality  Total (NIH Stroke Scale): 0       Modified [place] Score: (unknown)  Allison Coma Scale:    ABCD2 Score:    [id number] Score:   HAS -BLED Score:   ICH Score:   Hunt & Cook Classification:      Hemorrhagic change of an Ischemic Stroke: Does this patient have an ischemic stroke with hemorrhagic changes? No     Neurologic Chief Complaint:  L MCA, expressive aphasia     Subjective:     Interval History: Patient is seen for follow-up neurological assessment and treatment recommendations:     Continuing on IV Vanc. Erythema remarkably improved. Waiting on acceptance to skilled nursing nursing home.     HPI, Past Medical, Family, and Social History remains the same as documented in the initial encounter.     Review of Systems   Constitutional: Negative for fatigue and fever.   HENT: Positive for mouth sores (reports occasional bleeding R tonsil). Negative for trouble swallowing.    Skin: Positive for color change and wound.   Neurological: Negative for facial asymmetry, speech difficulty, weakness, light-headedness and numbness.   Psychiatric/Behavioral: Negative for confusion and decreased concentration.     Scheduled Meds:   aspirin  81 mg Oral Daily    atorvastatin  40 mg Oral QHS    diclofenac sodium  2 g Topical (Top) BID    heparin (porcine)  5,000 Units Subcutaneous Q8H    lisinopril  10 mg Oral Daily    mupirocin   Topical (Top) BID    senna-docusate 8.6-50 mg  1 tablet Oral BID    vancomycin (VANCOCIN) IVPB  1,500 mg Intravenous Q12H     Continuous Infusions:  PRN Meds:acetaminophen, sodium chloride 0.9%, sodium chloride 0.9%    Objective:     Vital Signs (Most Recent):  Temp: 97.9 °F (36.6 °C) (12/16/19 0805)  Pulse: 72 (12/16/19 0805)  Resp: 16 (12/16/19 0805)  BP: (!) 146/80 (12/16/19 0805)  SpO2: (!) 92 % (12/16/19 0805)  BP Location: Left arm    Vital Signs Range (Last 24H):  Temp:  [97.6 °F (36.4 °C)-98.4 °F (36.9 °C)]   Pulse:  [61-72]   Resp:  [16-18]   BP: (139-149)/(76-80)   SpO2:  [92  %-97 %]   BP Location: Left arm    Physical Exam   Constitutional: He is oriented to person, place, and time. He appears well-developed. No distress.   HENT:   Head: Normocephalic and atraumatic.   Eyes: Conjunctivae and EOM are normal.   Cardiovascular: Normal rate.   Pulmonary/Chest: Effort normal. No respiratory distress.   Musculoskeletal: Normal range of motion. He exhibits no edema or deformity.   Neurological: He is alert and oriented to person, place, and time. No sensory deficit. He exhibits normal muscle tone.   Skin: Skin is warm and dry. He is not diaphoretic. There is erythema.   LUE erythema w/ significant improvement. Forearm hardening/cord still present, but also improved.    Psychiatric: He has a normal mood and affect. His behavior is normal.   Vitals reviewed.      Neurological Exam:   LOC: alert  Attention Span: Good   Language: No aphasia  Articulation: No dysarthria  Orientation: Person, Place, Time   Visual Fields: Full  EOM (CN III, IV, VI): Full/intact  Facial Movement (CN VII): Symmetric facial expression    Motor: Arm left  Normal 5/5  Leg left  Normal 5/5  Arm right  Normal 5/5  Leg right Normal 5/5  Sensation: Intact to light touch, temperature and vibration  Tone: Normal tone throughout    Laboratory:  CMP:   Recent Labs   Lab 12/16/19  0558   CALCIUM 9.1      K 4.2   CO2 27      BUN 14   CREATININE 0.8     BMP:   Recent Labs   Lab 12/16/19  0558      K 4.2      CO2 27   BUN 14   CREATININE 0.8   CALCIUM 9.1     CBC:   Recent Labs   Lab 12/16/19  0558   WBC 7.06   RBC 4.24*   HGB 12.8*   HCT 38.7*      MCV 91   MCH 30.2   MCHC 33.1     Lipid Panel:   No results for input(s): CHOL, LDLCALC, HDL, TRIG in the last 168 hours.  Coagulation:   No results for input(s): PT, INR, APTT in the last 168 hours.  Platelet Aggregation Study: No results for input(s): PLTAGG, PLTAGINTERP, PLTAGREGLACO, ADPPLTAGGREG in the last 168 hours.  Hgb A1C:   No results for  input(s): HGBA1C in the last 168 hours.  TSH:   No results for input(s): TSH in the last 168 hours.      Diagnostic Results     Brain imaging:      CT Head 12/8/19  1. Evolving left MCA distribution infarct with superimposed petechial hemorrhage and mild mass effect resulting in minimal rightward midline shift of 1 mm.    MRI Brain 12/07/2019    1. Left MCA distribution infarct with hemorrhagic conversion in the insula and temporal lobe.  Associated localized mass effect without midline shift.  2. Chronic microvascular ischemic change.    CT Head 12/06/2019    Ill-defined regions of decreased attenuation left MCA territory specifically left insula, left posterior temporoparietal cortex and lateral aspect of the left lentiform nucleus most compatible with acute areas of infarction.  There is no significant mass effect or midline shift.  No evidence for hemorrhagic conversion.  There is slight hyperdensity associated with the intracranial vasculature likely related to recent contrast administration for outside institution angiography       Vessel Imaging     US UE Left 12/11/19  Partially occlusive thrombosis of the cephalic vein (superficial vein).  No evidence of deep venous thrombosis.    Cerebral Angiogram 12/6/19  Technically successful aspiration thrombectomy of the left MCA distal M1 segment artery with resulting TICI 2b flow.       Cardiac Evaluation:     TTE 12/6/19  · Normal left ventricular systolic function. The estimated ejection fraction is 65%  · Eccentric left ventricular hypertrophy.  · Indeterminate left ventricular diastolic function.  · Normal right ventricular systolic function.  · Mild aortic valve stenosis. peak velocity is 2.94 m/s; mean gradient is 20 mmHg. ABBIE cannot be measured accurately.  · Normal central venous pressure (3 mm Hg).  · Mild mitral regurgitation.  · No wall motion abnormalities.         Miscellaneous Imaging:    CT Soft Tissue Neck 12/10/19  Right oropharyngeal mass which  appears to involve the soft palate.  Abnormal conglomerate of level II/III lymph nodes with necrotic centers with surrounding inflammatory stranding and numerous adjacent enlarged solid lymph nodes.  Constellation of findings concerning for neoplastic process such as metastatic squamous cell carcinoma.      Elva Santos NP  [facility]  Department of Vascular Neurology   [facility]

## 2019-12-16 NOTE — ASSESSMENT & PLAN NOTE
- LUE with erythema, tenderness, and firmness to palpation - prior IV site.  - US LUE 12/12 showed partially occlusive thrombosis of the cephalic vein (superficial vein); no DVT  - Initial treatment w/ Bactrim did not result in significant improvement  - Transitioned to Vanc and on Day #4 patient w/ improved symptoms   - Patient to complete 5 day course on 12/17

## 2019-12-16 NOTE — PLAN OF CARE
Problem: SLP Goal  Goal: SLP Goal  Description  Speech Language Pathology Goals  Goals expected to be met by 12/23  1. Pt will tolerate dental soft diet with thin liquids with adequate oral clearance and no overt S/S aspiration, MOD I.  2.  Pt will name common objects with 75% accuracy or higher, MIN A.  3. Pt will answer complex YNQ with 90% accuracy, MIN A.  4. Pt will follow complexcommands with 90% accuracy, MIN A.  5. Pt will participate in further assessment of cognition as appropriate.  6. Further educate Pt and family on safety awareness and compensatory strategies for fx communication .    Speech Language Pathology Goals  Goals expected to be met by 12/14/19  1. Pt will tolerate dental soft diet with thin liquids with adequate oral clearance and no overt S/S aspiration, MOD I- ongoing  2. Pt will complete automatic speech tasks with 90% accuracy, MIN A- met  3. Pt will name common objects with 75% accuracy or higher, MIN A- ongoing  4. Pt will answer YNQ with 90% accuracy, MIN A- met for simple y/n   5. Pt will follow simple commands with 90% accuracy, MIN A- met   6. Pt will participate in further assessment of cognition, reading and writing - met for reading/writing  7. Educate Pt and family on safety awareness and compensatory strategies for fx communication  - ongoing      Outcome: Ongoing, Progressing    Continue ST services with the above plan of care.    MONICO Collins, CCC-SLP  Speech Language Pathologist  [phone]

## 2019-12-16 NOTE — NURSING
Patient rounds made more than every hour.  VVS.  Afebrile.  AAO x 4.  No complaints of chest pain or shortness of breath.  20g cath to right ac without redness or edema at the site. Site and dressing change due on the 18 Dec 19.  Abd - soft.  Voiding per urinal, clyde color urine. Neuro checks every 4 hours remained within normal limits.  Remained free from falls or incidents this shift.

## 2019-12-16 NOTE — PLAN OF CARE
Problem: Skin Injury Risk Increased  Goal: Skin Health and Integrity  Outcome: Ongoing, Progressing     Problem: Adult Inpatient Plan of Care  Goal: Patient-Specific Goal (Individualization)  Description  Admit Date: 12/6/2019    Admit Dx: l mca    History reviewed. No pertinent past medical history.    History reviewed. No pertinent surgical history.    Individualization:   1. Pt likes dim lights    Restraints: (date/time/why or N/A) na       Outcome: Ongoing, Progressing  Goal: Absence of Hospital-Acquired Illness or Injury  Outcome: Ongoing, Progressing     Problem: Fall Injury Risk  Goal: Absence of Fall and Fall-Related Injury  Outcome: Ongoing, Progressing     Problem: Adjustment to Illness (Stroke, Ischemic/Transient Ischemic Attack)  Goal: Optimal Coping  Outcome: Ongoing, Progressing     Problem: Bowel Elimination Impaired (Stroke, Ischemic/Transient Ischemic Attack)  Goal: Effective Bowel Elimination  Outcome: Ongoing, Progressing     Problem: Cerebral Tissue Perfusion Risk (Stroke, Ischemic/Transient Ischemic Attack)  Goal: Optimal Cerebral Tissue Perfusion  Outcome: Ongoing, Progressing     Problem: Functional Ability Impaired (Stroke, Ischemic/Transient Ischemic Attack)  Goal: Optimal Functional Ability  Outcome: Ongoing, Progressing     Problem: Hemodynamic Instability (Stroke, Ischemic/Transient Ischemic Attack)  Goal: Vital Signs Remain in Desired Range  Outcome: Ongoing, Progressing     Problem: Sensorimotor Impairment (Stroke, Ischemic/Transient Ischemic Attack)  Goal: Improved Sensorimotor Function  Outcome: Ongoing, Progressing     Problem: Oral Intake Inadequate  Goal: Improved Oral Intake  Outcome: Ongoing, Progressing

## 2019-12-16 NOTE — ASSESSMENT & PLAN NOTE
60 y.o. yo male with unknown past medical history who presented to [facility] with AMS s/p fall. LKN ~1130 pm on 12/5. CTA completed at OSH revealed left MCA proximal occlusion. [place] [facility] [place] VAN +. Patient taken to IR and is now s/p successful thrombectomy with TICI 2b flow. Admitted to [facility] for close monitoring/higher level of care.     Noted Left MCA distribution infarct with small hemorrhagic conversion in the insula and temporal lobe on MRI Brain. Etiology ESUS at this time. Will plan for EP referral for loop recorder placement at discharge.    Patient progressing well; dispo outpatient therapy. Plans for care home nursing home placement as patient does not have a safe discharge plan.   IV Abx for L arm cellulitis/thrombophlebitis that did not improve on PO Abx. Now that significantly better, patient to complete 5 day course on 12/17    Antithrombotics for secondary stroke prevention: ASA 81mg QD  Statins for secondary stroke prevention and hyperlipidemia, if present:   Statins: Atorvastatin- 40 mg daily  Aggressive risk factor modification: HTN, HLD, Diet, Exercise, Obesity  Rehab efforts: The patient has been evaluated by a stroke team provider and the therapy needs have been fully considered based off the presenting complaints and exam findings. The following therapy evaluations are needed: PT evaluate and treat, OT evaluate and treat, SLP evaluate and treat, PM&R evaluate for appropriate placement- Dispo outpatient therapy however electing for care home nursing home placement  Diagnostics ordered/pending: none  VTE prophylaxis: Heparin 5000 units SQ every 8 hours; SCDs  BP parameters: Infarct: Post successful thrombectomy, SBP < 160

## 2019-12-16 NOTE — PLAN OF CARE
[facility] looking at patient's financial and working on case. CM left contact info.

## 2019-12-16 NOTE — PT/OT/SLP PROGRESS
"Speech Language Pathology Treatment    Patient Name:  Riaz Lane   MRN:  [id number]  Admitting Diagnosis: Embolic stroke involving left middle cerebral artery    Recommendations:                 General Recommendations:  Speech/language therapy and Cognitive-linguistic therapy; monitor diet tolerance  Diet recommendations:  Regular, Liquid Diet Level: Thin   Aspiration Precautions: Standard aspiration precautions   General Precautions: Standard, aphasia  Communication strategies:  provide increased time to answer and go to room if call light pushed    Subjective     "I think so" (pt stated regarding improving speech)    Pain/Comfort:  · Pain Rating 1: 0/10  · Pain Rating Post-Intervention 1: 0/10    Objective:     Has the patient been evaluated by SLP for swallowing?   Yes  Keep patient NPO? No   Current Respiratory Status: room air      Pt seen this am with no family present.  Pt remains alert and cooperative.  He stated months of the year ind'ly and responded to automatic phrases with 100% accuracy.  Pt listed 3 items in given categories with 75% accuracy and named common objects with 70% accuracy.  Complex y/n questions were responded to with 80% accuracy. Pt followed 1 and 2 step commands ind'ly.  He required repetition x2 to follow 3 step verbal command.  Pt read a list of names without difficulty.  He wrote his name and a sentence ind'ly as well.  No evidence of neglect or significant visual spatial difficulty noted with clock face task.  Ongoing education provided to pt regarding deficits, word finding strategies and plan of care.     Assessment:     Riaz Lane is a 59 y.o. male with an SLP diagnosis of Aphasia and Cognitive-Linguistic Impairment.    Goals:   Multidisciplinary Problems     SLP Goals        Problem: SLP Goal    Goal Priority Disciplines Outcome   SLP Goal     SLP Ongoing, Progressing   Description:  Speech Language Pathology Goals  Goals expected to be met by 12/23  1. Pt will tolerate dental " soft diet with thin liquids with adequate oral clearance and no overt S/S aspiration, MOD I.  2.  Pt will name common objects with 75% accuracy or higher, MIN A.  3. Pt will answer complex YNQ with 90% accuracy, MIN A.  4. Pt will follow complexcommands with 90% accuracy, MIN A.  5. Pt will participate in further assessment of cognition as appropriate.  6. Further educate Pt and family on safety awareness and compensatory strategies for fx communication .    Speech Language Pathology Goals  Goals expected to be met by 12/14/19  1. Pt will tolerate dental soft diet with thin liquids with adequate oral clearance and no overt S/S aspiration, MOD I- ongoing  2. Pt will complete automatic speech tasks with 90% accuracy, MIN A- met  3. Pt will name common objects with 75% accuracy or higher, MIN A- ongoing  4. Pt will answer YNQ with 90% accuracy, MIN A- met for simple y/n   5. Pt will follow simple commands with 90% accuracy, MIN A- met   6. Pt will participate in further assessment of cognition, reading and writing - met for reading/writing  7. Educate Pt and family on safety awareness and compensatory strategies for fx communication  - ongoing                       Plan:     · Patient to be seen:  4 x/week   · Plan of Care expires:  01/06/20  · Plan of Care reviewed with:  patient   · SLP Follow-Up:  Yes       Discharge recommendations:  outpatient speech therapy     Time Tracking:     SLP Treatment Date:   12/16/19  Speech Start Time:  1147  Speech Stop Time:  1205     Speech Total Time (min):  18 min    Billable Minutes: Speech Therapy Individual 18    MONICO Collins, CCC-SLP  Speech Language Pathologist  [phone]

## 2019-12-17 VITALS
RESPIRATION RATE: 18 BRPM | HEIGHT: 68 IN | WEIGHT: 214.06 LBS | HEART RATE: 66 BPM | TEMPERATURE: 98 F | SYSTOLIC BLOOD PRESSURE: 167 MMHG | OXYGEN SATURATION: 95 % | DIASTOLIC BLOOD PRESSURE: 94 MMHG | BODY MASS INDEX: 32.44 KG/M2

## 2019-12-17 LAB — VANCOMYCIN TROUGH SERPL-MCNC: 13.8 UG/ML (ref 10–22)

## 2019-12-17 PROCEDURE — 94761 N-INVAS EAR/PLS OXIMETRY MLT: CPT

## 2019-12-17 PROCEDURE — 97530 THERAPEUTIC ACTIVITIES: CPT

## 2019-12-17 PROCEDURE — 99239 HOSP IP/OBS DSCHRG MGMT >30: CPT | Mod: ,,, | Performed by: PSYCHIATRY & NEUROLOGY

## 2019-12-17 PROCEDURE — 97535 SELF CARE MNGMENT TRAINING: CPT

## 2019-12-17 PROCEDURE — 25000003 PHARM REV CODE 250: Performed by: STUDENT IN AN ORGANIZED HEALTH CARE EDUCATION/TRAINING PROGRAM

## 2019-12-17 PROCEDURE — 63600175 PHARM REV CODE 636 W HCPCS: Performed by: PSYCHIATRY & NEUROLOGY

## 2019-12-17 PROCEDURE — 63600175 PHARM REV CODE 636 W HCPCS: Performed by: NURSE PRACTITIONER

## 2019-12-17 PROCEDURE — 25000003 PHARM REV CODE 250: Performed by: PSYCHIATRY & NEUROLOGY

## 2019-12-17 PROCEDURE — 92507 TX SP LANG VOICE COMM INDIV: CPT

## 2019-12-17 PROCEDURE — 36415 COLL VENOUS BLD VENIPUNCTURE: CPT

## 2019-12-17 PROCEDURE — 99239 PR HOSPITAL DISCHARGE DAY,>30 MIN: ICD-10-PCS | Mod: ,,, | Performed by: PSYCHIATRY & NEUROLOGY

## 2019-12-17 PROCEDURE — 80202 ASSAY OF VANCOMYCIN: CPT

## 2019-12-17 RX ORDER — ASPIRIN 81 MG/1
81 TABLET ORAL DAILY
Refills: 0
Start: 2019-12-18 | End: 2020-12-17

## 2019-12-17 RX ORDER — CLINDAMYCIN HYDROCHLORIDE 300 MG/1
300 CAPSULE ORAL EVERY 6 HOURS
Start: 2019-12-17

## 2019-12-17 RX ORDER — ACETAMINOPHEN 325 MG/1
650 TABLET ORAL EVERY 6 HOURS PRN
Refills: 0
Start: 2019-12-17

## 2019-12-17 RX ORDER — LISINOPRIL 10 MG/1
10 TABLET ORAL DAILY
Qty: 90 TABLET | Refills: 3
Start: 2019-12-18 | End: 2020-12-17

## 2019-12-17 RX ORDER — ATORVASTATIN CALCIUM 40 MG/1
40 TABLET, FILM COATED ORAL NIGHTLY
Qty: 90 TABLET | Refills: 3
Start: 2019-12-17 | End: 2020-12-16

## 2019-12-17 RX ORDER — AMOXICILLIN 250 MG
1 CAPSULE ORAL 2 TIMES DAILY
Start: 2019-12-17

## 2019-12-17 RX ORDER — SULFAMETHOXAZOLE AND TRIMETHOPRIM 800; 160 MG/1; MG/1
1 TABLET ORAL 2 TIMES DAILY
Start: 2019-12-17 | End: 2019-12-17 | Stop reason: HOSPADM

## 2019-12-17 RX ORDER — DICLOFENAC SODIUM 10 MG/G
2 GEL TOPICAL 2 TIMES DAILY
Start: 2019-12-17

## 2019-12-17 RX ADMIN — SENNOSIDES AND DOCUSATE SODIUM 1 TABLET: 8.6; 5 TABLET ORAL at 08:12

## 2019-12-17 RX ADMIN — ASPIRIN 81 MG: 81 TABLET, COATED ORAL at 08:12

## 2019-12-17 RX ADMIN — LISINOPRIL 10 MG: 5 TABLET ORAL at 08:12

## 2019-12-17 RX ADMIN — HEPARIN SODIUM 5000 UNITS: 5000 INJECTION INTRAVENOUS; SUBCUTANEOUS at 05:12

## 2019-12-17 RX ADMIN — VANCOMYCIN HYDROCHLORIDE 1500 MG: 1.5 INJECTION, POWDER, LYOPHILIZED, FOR SOLUTION INTRAVENOUS at 09:12

## 2019-12-17 RX ADMIN — HEPARIN SODIUM 5000 UNITS: 5000 INJECTION INTRAVENOUS; SUBCUTANEOUS at 01:12

## 2019-12-17 RX ADMIN — DICLOFENAC 2 G: 10 GEL TOPICAL at 08:12

## 2019-12-17 NOTE — PLAN OF CARE
[facility]    FACILITY TRANSFER ORDERS      Patient Name: Riaz Lane  YOB: 1960    PCP: No primary care provider on file.   PCP Address: No primary physician on file.  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 , Occupational Therapy to evaluate and treat. and  to evaluate for community resources/long-range planning.        Medications: Review discharge medications with patient and family and provide education.      Current Discharge Medication List      START taking these medications    Details   acetaminophen (TYLENOL) 325 MG tablet Take 2 tablets (650 mg total) by mouth every 6 (six) hours as needed.  Refills: 0      aspirin (ECOTRIN) 81 MG EC tablet Take 1 tablet (81 mg total) by mouth once daily.  Refills: 0      atorvastatin (LIPITOR) 40 MG tablet Take 1 tablet (40 mg total) by mouth every evening.  Qty: 90 tablet, Refills: 3      diclofenac sodium (VOLTAREN) 1 % Gel Apply 2 g topically 2 (two) times daily.      lisinopril 10 MG tablet Take 1 tablet (10 mg total) by mouth once daily.  Qty: 90 tablet, Refills: 3       senna-docusate 8.6-50 mg (PERICOLACE) 8.6-50 mg per tablet Take 1 tablet by mouth 2 (two) times daily.      Clindamycin 300 mg  Take 1 capsule by mouth every six hours.  Stop date 12/19/2019 after last pm dose                  ___Electronic Signature____________________  Emely Bashir, MAURO  12/17/2019

## 2019-12-17 NOTE — ASSESSMENT & PLAN NOTE
- Area of cytotoxic cerebral edema identified when reviewing brain imaging in the territory of the left middle cerebral artery. There (is/is not) mass effect associated with it. We will continue to monitor the patients clinical exam for any worsening of symptoms which may indicate expansion of the stroke or the area of the edema resulting in the clinical change  - The pattern is suggestive of ESUS etiology  - Continuing to monitor; doing well

## 2019-12-17 NOTE — NURSING
[facility] to the nurse that will be admitting him. His IV was removed and he complains of no pain. He is awaiting transports arrival at 1515. [facility].

## 2019-12-17 NOTE — PT/OT/SLP PROGRESS
"Speech Language Pathology Treatment    Patient Name:  Riaz Lane   MRN:  [id number]  Admitting Diagnosis: Embolic stroke involving left middle cerebral artery    Recommendations:                 General Recommendations:  Speech/language therapy and Cognitive-linguistic therapy  Diet recommendations:  Regular, Liquid Diet Level: Thin   Aspiration Precautions: Standard aspiration precautions   General Precautions: Standard, aphasia  Communication strategies:  provide increased time to answer    Subjective     "I don't know why"  Patient goals: go home    Pain/Comfort:  · Pain Rating 1: 0/10  · Pain Rating Post-Intervention 1: 0/10    Objective:     Has the patient been evaluated by SLP for swallowing?   Yes  Keep patient NPO? No   Current Respiratory Status: room air      Pt seen bedside with no family present. Pt unable to recall word finding strategies but did use description and alternate words to aid in word finding during conversational speech.Pt named members described with 20% acc and with repetition of items and cues, 90% acc. He named 4 items in a category with 100% acc. Pt oriented to month and year and able to use white board to aid in date and name of hospital. He completed picture description task using his personal photo album. Word finding deficits noted with incomplete thoughts at times and occasional nonspecific language. Pt very motivated with good participation in session. White board updated. Pt expressed understanding of all information but does need reenforcement.    Assessment:     Riaz Lane is a 59 y.o. male with an SLP diagnosis of Aphasia and Cognitive-Linguistic Impairment.  He presents with progress towards goals.    Goals:   Multidisciplinary Problems     SLP Goals        Problem: SLP Goal    Goal Priority Disciplines Outcome   SLP Goal     SLP Ongoing, Progressing   Description:  Speech Language Pathology Goals  Goals expected to be met by 12/23  1. Pt will tolerate dental soft diet with " thin liquids with adequate oral clearance and no overt S/S aspiration, MOD I.  2.  Pt will name common objects with 75% accuracy or higher, MIN A.  3. Pt will answer complex YNQ with 90% accuracy, MIN A.  4. Pt will follow complexcommands with 90% accuracy, MIN A.  5. Pt will participate in further assessment of cognition as appropriate.  6. Further educate Pt and family on safety awareness and compensatory strategies for fx communication .    Speech Language Pathology Goals  Goals expected to be met by 12/14/19  1. Pt will tolerate dental soft diet with thin liquids with adequate oral clearance and no overt S/S aspiration, MOD I- ongoing  2. Pt will complete automatic speech tasks with 90% accuracy, MIN A- met  3. Pt will name common objects with 75% accuracy or higher, MIN A- ongoing  4. Pt will answer YNQ with 90% accuracy, MIN A- met for simple y/n   5. Pt will follow simple commands with 90% accuracy, MIN A- met   6. Pt will participate in further assessment of cognition, reading and writing - met for reading/writing  7. Educate Pt and family on safety awareness and compensatory strategies for fx communication  - ongoing                       Plan:     · Patient to be seen:  4 x/week   · Plan of Care expires:  01/06/20  · Plan of Care reviewed with:  patient   · SLP Follow-Up:  Yes       Discharge recommendations:  outpatient speech therapy   Barriers to Discharge:  None    Time Tracking:     SLP Treatment Date:   12/17/19  Speech Start Time:  1004  Speech Stop Time:  1023     Speech Total Time (min):  19 min    Billable Minutes: Speech Therapy Individual 11 and [facility]eld Care/Home Management Training 8    MONICO Bourne, CCC-SLP  12/17/2019

## 2019-12-17 NOTE — SUBJECTIVE & OBJECTIVE
Neurologic Chief Complaint:  L MCA, expressive aphasia     Subjective:     Interval History: Patient is seen for follow-up neurological assessment and treatment recommendations: No acute issues or events overnight.  No new deficits; NIH 0.  Doing well in good spirits.  Plan for transfer to NH today.  Continuing Vanc for cellulitis.  Continues to improve.      HPI, Past Medical, Family, and Social History remains the same as documented in the initial encounter.     Review of Systems   Constitutional: Negative for fatigue and fever.   HENT: Positive for mouth sores (reports occasional bleeding R tonsil). Negative for trouble swallowing.    Skin: Positive for color change and wound.   Neurological: Negative for facial asymmetry, speech difficulty, weakness, light-headedness and numbness.   Psychiatric/Behavioral: Negative for confusion and decreased concentration.     Scheduled Meds:   aspirin  81 mg Oral Daily    atorvastatin  40 mg Oral QHS    diclofenac sodium  2 g Topical (Top) BID    heparin (porcine)  5,000 Units Subcutaneous Q8H    lisinopril  10 mg Oral Daily    senna-docusate 8.6-50 mg  1 tablet Oral BID    vancomycin (VANCOCIN) IVPB  1,500 mg Intravenous Q12H     Continuous Infusions:  PRN Meds:acetaminophen, sodium chloride 0.9%, sodium chloride 0.9%    Objective:     Vital Signs (Most Recent):  Temp: 97.6 °F (36.4 °C) (12/17/19 1101)  Pulse: 66 (12/17/19 1110)  Resp: 18 (12/17/19 1101)  BP: (!) 167/94 (12/17/19 1101)  SpO2: 95 % (12/17/19 1101)  BP Location: Left arm    Vital Signs Range (Last 24H):  Temp:  [97.5 °F (36.4 °C)-98.3 °F (36.8 °C)]   Pulse:  [61-79]   Resp:  [16-20]   BP: (119-167)/(75-94)   SpO2:  [94 %-98 %]   BP Location: Left arm    Physical Exam   Constitutional: He appears well-developed. No distress.   HENT:   Head: Atraumatic.   Cardiovascular: Normal rate.   Pulmonary/Chest: Effort normal.   Skin: Skin is warm and dry. He is not diaphoretic.   LUE erythema w/ significant  improvement. Forearm hardening/cord still present, but also improved.    Psychiatric: He has a normal mood and affect. His behavior is normal.   Vitals reviewed.      Neurological Exam:   LOC: alert  Attention Span: Good   Language: No aphasia  Articulation: No dysarthria  Orientation: Person, Place, Time   Visual Fields: Full  EOM (CN III, IV, VI): Full/intact  Facial Movement (CN VII): Symmetric facial expression    Motor: Arm left  Normal 5/5  Leg left  Normal 5/5  Arm right  Normal 5/5  Leg right Normal 5/5  Sensation: Intact to light touch, temperature and vibration  Tone: Normal tone throughout    Laboratory:  CMP:   No results for input(s): GLUCOSE, CALCIUM, ALBUMIN, PROT, NA, K, CO2, CL, BUN, CREATININE, ALKPHOS, ALT, AST, BILITOT in the last 24 hours.  BMP:   Recent Labs   Lab 12/16/19  0558      K 4.2      CO2 27   BUN 14   CREATININE 0.8   CALCIUM 9.1     CBC:   Recent Labs   Lab 12/16/19  0558   WBC 7.06   RBC 4.24*   HGB 12.8*   HCT 38.7*      MCV 91   MCH 30.2   MCHC 33.1     Lipid Panel:   No results for input(s): CHOL, LDLCALC, HDL, TRIG in the last 168 hours.  Coagulation:   No results for input(s): PT, INR, APTT in the last 168 hours.  Platelet Aggregation Study: No results for input(s): PLTAGG, PLTAGINTERP, PLTAGREGLACO, ADPPLTAGGREG in the last 168 hours.  Hgb A1C:   No results for input(s): HGBA1C in the last 168 hours.  TSH:   No results for input(s): TSH in the last 168 hours.      Diagnostic Results     Brain imaging:      CT Head 12/8/19  1. Evolving left MCA distribution infarct with superimposed petechial hemorrhage and mild mass effect resulting in minimal rightward midline shift of 1 mm.    MRI Brain 12/07/2019    1. Left MCA distribution infarct with hemorrhagic conversion in the insula and temporal lobe.  Associated localized mass effect without midline shift.  2. Chronic microvascular ischemic change.    CT Head 12/06/2019    Ill-defined regions of decreased  attenuation left MCA territory specifically left insula, left posterior temporoparietal cortex and lateral aspect of the left lentiform nucleus most compatible with acute areas of infarction.  There is no significant mass effect or midline shift.  No evidence for hemorrhagic conversion.  There is slight hyperdensity associated with the intracranial vasculature likely related to recent contrast administration for outside institution angiography       Vessel Imaging     US UE Left 12/11/19  Partially occlusive thrombosis of the cephalic vein (superficial vein).  No evidence of deep venous thrombosis.    Cerebral Angiogram 12/6/19  Technically successful aspiration thrombectomy of the left MCA distal M1 segment artery with resulting TICI 2b flow.       Cardiac Evaluation:     TTE 12/6/19  · Normal left ventricular systolic function. The estimated ejection fraction is 65%  · Eccentric left ventricular hypertrophy.  · Indeterminate left ventricular diastolic function.  · Normal right ventricular systolic function.  · Mild aortic valve stenosis. peak velocity is 2.94 m/s; mean gradient is 20 mmHg. ABBIE cannot be measured accurately.  · Normal central venous pressure (3 mm Hg).  · Mild mitral regurgitation.  · No wall motion abnormalities.         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  Abnormal conglomerate of level II/III lymph nodes with necrotic centers with surrounding inflammatory stranding and numerous adjacent enlarged solid lymph nodes.  Constellation of findings concerning for neoplastic process such as metastatic squamous cell carcinoma.

## 2019-12-17 NOTE — ASSESSMENT & PLAN NOTE
- Now significantly improved   - Continue aggressive SLP  - Some mild difficulty and decrease in speed when reading

## 2019-12-17 NOTE — PLAN OF CARE
POC reviewed with patient this shift.  A/O x4.  Respirations unlabored.  Skin w/d.  Continent of b/b.  Ambulates with standby assist.  ABX Tx continues with no adverse effects noted.  Able to verbalize wants/needs.  No c/o pain or discomfort at this time.

## 2019-12-17 NOTE — PROGRESS NOTES
Pharmacokinetic Assessment Follow Up: IV Vancomycin    Vancomycin serum concentration assessment(s):  · Trough of 13.8 mcg/mL was drawn ~1hr early and will be used to guide therapy  · Therapeutic trough for SSTI: Goal 10-15 mcg/mL     Vancomycin Regimen Plan:  1. Will continue vancomycin 1500mg q12 hours  2. Will repeat trough in 3 days if planning to continue therapy    Drug levels (last 3 results):  Recent Labs   Lab Result Units 12/14/19  2315 12/17/19  0857   Vancomycin-Trough ug/mL 9.8* 13.8       Pharmacy will continue to follow and monitor vancomycin.    Please contact pharmacy at extension [phone] for questions regarding this assessment.    Thank you for the consult,   Ana Montgomery       Patient brief summary:  Riaz Lane is a 59 y.o. male initiated on antimicrobial therapy with IV Vancomycin for treatment of skin & soft tissue infection    Drug Allergies:   Review of patient's allergies indicates:  No Known Allergies    Actual Body Weight:   97.1 kg    Renal Function:   Estimated Creatinine Clearance: 112.4 mL/min (based on SCr of 0.8 mg/dL).,       CBC (last 72 hours):  Recent Labs   Lab Result Units 12/16/19  0558   WBC K/uL 7.06   Hemoglobin g/dL 12.8*   Hematocrit % 38.7*   Platelets K/uL 316   Gran% % 52.3   Lymph% % 34.4   Mono% % 8.6   Eosinophil% % 2.7   Basophil% % 1.6   Differential Method  Automated       Metabolic Panel (last 72 hours):  Recent Labs   Lab Result Units 12/16/19  0558   Sodium mmol/L 140   Potassium mmol/L 4.2   Chloride mmol/L 105   CO2 mmol/L 27   Glucose mg/dL 84   BUN, Bld mg/dL 14   Creatinine mg/dL 0.8   Magnesium mg/dL 2.0   Phosphorus mg/dL 3.4       Vancomycin Administrations:  vancomycin given in the last 96 hours                   vancomycin 1.5 g in dextrose 5 % 250 mL IVPB (ready to mix) (mg) 1,500 mg New Bag 12/17/19 0956     1,500 mg New Bag 12/16/19 2157     1,500 mg New Bag  0925     1,500 mg New Bag 12/15/19 2224    vancomycin 1.25 g in dextrose 5% 250 mL IVPB  (ready to mix) (mg) 1,250 mg New Bag 12/15/19 1040     1,250 mg New Bag 12/14/19 2342     1,250 mg New Bag  1052     1,250 mg New Bag 12/13/19 2235     1,250 mg New Bag  1212                Microbiologic Results:  Microbiology Results (last 7 days)     ** No results found for the last 168 hours. **

## 2019-12-17 NOTE — PLAN OF CARE
Problem: Occupational Therapy Goal  Goal: Occupational Therapy Goal  Description  Goals set on 12/10, with expiration date 12/21:  Patient will increase functional independence with ADLs by performing:    Feeding with Stand-by Assistance. Met  Grooming while standing at sink with SBAssistance  UB Dressing with SBAssistance   LB Dressing with SBAssistance  Toileting from toilet with SBAssistance for hygiene and clothing management  Rolling to Bilateral with Stand-by Assistance. Met  Supine <> Sit with Stand-by Assistance.Met  Step transfer with SBAssistance  Toilet transfer to toilet with SBAssistance     Outcome: Ongoing, Progressing     Pt progressing towards goals. Cont OT POC  LASHAY Mendes  12/17/2019

## 2019-12-17 NOTE — ASSESSMENT & PLAN NOTE
- LUE with erythema, tenderness, and firmness to palpation - prior IV site.  - US LUE 12/12 showed partially occlusive thrombosis of the cephalic vein (superficial vein); no DVT  - Initial treatment w/ Bactrim did not result in significant improvement  - Transitioned to Vanc and on Day #4 patient w/ improved symptoms   - Pt transitioned to clindamycin oral for discharge to continue until 12/19/2019

## 2019-12-17 NOTE — ASSESSMENT & PLAN NOTE
60 y.o. yo male with unknown past medical history who presented to [place] with AMS s/p fall. LKN ~1130 pm on 12/5. CTA completed at OSH revealed left MCA proximal occlusion. Patient taken to IR s/p successful thrombectomy with TICI 2b flow.  Noted Left MCA distribution infarct with small hemorrhagic conversion in the insula and temporal lobe on MRI Brain. Now with only mild cognitive and language deficits.  Etiology ESUS at this time.   Will plan for EP referral for loop recorder placement at discharge.      Antithrombotics for secondary stroke prevention: ASA 81mg QD  Statins for secondary stroke prevention and hyperlipidemia, if present:   Statins: Atorvastatin- 40 mg daily  Aggressive risk factor modification: HTN, HLD, Diet, Exercise, Obesity  Rehab efforts: The patient has been evaluated by a stroke team provider and the therapy needs have been fully considered based off the presenting complaints and exam findings. The following therapy evaluations are needed: PT evaluate and treat, OT evaluate and treat, SLP evaluate and treat, PM&R evaluate for appropriate placement- Dispo outpatient therapy however electing for halfway nursing home placement  Diagnostics ordered/pending: none  VTE prophylaxis: Heparin 5000 units SQ every 8 hours; SCDs  BP parameters: Infarct: Post successful thrombectomy, SBP < 160

## 2019-12-17 NOTE — DISCHARGE SUMMARY
[facility]  Vascular Neurology  Comprehensive Stroke Center  Discharge Summary     Summary:     Admit Date: 12/6/2019  8:01 AM    Discharge Date and Time: No discharge date for patient encounter.    Attending Physician: Gretchen Mir MD     Discharge Provider: Emely Bashir NP    History of Present Illness: Patient is a 58 yo male  with unknown past medical history who is being evaluated by the Vascular Neurology service after developing AMS. Pt was LKN at approximately 11:30 pm on 12/5 when he fell from a chair while drinking at a bar. Patient transported to [place]. CTA at OSH revealed L MCA proximal occlusion. [facility] unable to complete intervention. Patient transported by air to [facility] for possible intervention. VAN + on arrival. CT aspects ~7.Patient taken to IR and is now s/p successful thrombectomy with TICI 2b flow. Patient monitored for past 24hr in PACU.     Patient aphasic, family not at beside. History gathered from EMS and chart.    Hospital Course (synopsis of major diagnoses, care, treatment, and services provided during the course of the hospital stay): Patient admitted to [facility] s/p mechanical thrombectomy with TICI 2B reperfusion.  Repeat imaging demonstrated a left MCA distribution infarct with hemorrhagic conversion in the insula and temporal lobe on MRI; not causing any clinical deterioration.  Patient stared on ASA 81mg and atorvastatin 40mg for secondary stroke prevention. Pt stepped down to stroke unit after an uneventful [facility] stay.  Patient was evaluated and treated by PT/OT/SLP who recommended outpatient therapy.  Patient currently homeless.  senior care NH placement obtained.  Patient developed LUE cellulitis and was started on Bactrim DS.  Symptoms worsened and he was changed to Vanc.  Symptoms improved and he was transitioned to clindamycin oral at discharge.  Of note, Patient reported neck mass, which was evaluated with CT soft tissue. Concerning for neoplastic  process per radiology.  Patient to follow up with Dr. Evans ENT as outpatient.  Patient discharged to [facility].    Stroke Etiology: Undetermined Cause. Cryptogenic Embolism / ESUS (Embolic Stroke of Undetermined Source)    STROKE DOCUMENTATION   Acute Stroke Times   Last Known Normal Date: 12/05/19  Last Known Normal Time: 2330  Stroke Team Called Date: 12/06/19  Stroke Team Called Time: 0800  Stroke Team Arrival Date: 12/06/19  Stroke Team Arrival Time: 0804  CT Interpretation Time: 0810  Decision to Treat Time for Alteplase: (N/A)     NIH Scale:  1a. Level of Consciousness: 0-->Alert, keenly responsive  1b. LOC Questions: 0-->Answers both questions correctly  1c. LOC Commands: 0-->Performs both tasks correctly  2. Best Gaze: 0-->Normal  3. Visual: 0-->No visual loss  4. Facial Palsy: 0-->Normal symmetrical movements  5a. Motor Arm, Left: 0-->No drift, limb holds 90 (or 45) degrees for full 10 secs  5b. Motor Arm, Right: 0-->No drift, limb holds 90 (or 45) degrees for full 10 secs  6a. Motor Leg, Left: 0-->No drift, leg holds 30 degree position for full 5 secs  6b. Motor Leg, Right: 0-->No drift, leg holds 30 degree position for full 5 secs  7. Limb Ataxia: 0-->Absent  8. Sensory: 0-->Normal, no sensory loss  9. Best Language: 0-->No aphasia, normal  10. Dysarthria: 0-->Normal  11. Extinction and Inattention (formerly Neglect): 0-->No abnormality  Total (NIH Stroke Scale): 0        Modified [place] Score: 1  [place] Coma Scale:    ABCD2 Score:    [id number] Score:   HAS -BLED Score:   ICH Score:   Hunt & Cook Classification:       Assessment/Plan:     Diagnostic Results      Brain imaging:      CT Head 12/8/19  1. Evolving left MCA distribution infarct with superimposed petechial hemorrhage and mild mass effect resulting in minimal rightward midline shift of 1 mm.     MRI Brain 12/07/2019    1. Left MCA distribution infarct with hemorrhagic conversion in the insula and temporal lobe.  Associated localized  mass effect without midline shift.  2. Chronic microvascular ischemic change.     CT Head 12/06/2019    Ill-defined regions of decreased attenuation left MCA territory specifically left insula, left posterior temporoparietal cortex and lateral aspect of the left lentiform nucleus most compatible with acute areas of infarction.  There is no significant mass effect or midline shift.  No evidence for hemorrhagic conversion.  There is slight hyperdensity associated with the intracranial vasculature likely related to recent contrast administration for outside institution angiography        Vessel Imaging      US UE Left 12/11/19  Partially occlusive thrombosis of the cephalic vein (superficial vein).  No evidence of deep venous thrombosis.     Cerebral Angiogram 12/6/19  Technically successful aspiration thrombectomy of the left MCA distal M1 segment artery with resulting TICI 2b flow.        Cardiac Evaluation:      TTE 12/6/19  · Normal left ventricular systolic function. The estimated ejection fraction is 65%  · Eccentric left ventricular hypertrophy.  · Indeterminate left ventricular diastolic function.  · Normal right ventricular systolic function.  · Mild aortic valve stenosis. peak velocity is 2.94 m/s; mean gradient is 20 mmHg. ABBIE cannot be measured accurately.  · Normal central venous pressure (3 mm Hg).  · Mild mitral regurgitation.  · No wall motion abnormalities.           Miscellaneous Imaging:     CT Soft Tissue Neck 12/10/19  Right oropharyngeal mass which appears to involve the soft palate.  Abnormal conglomerate of level II/III lymph nodes with necrotic centers with surrounding inflammatory stranding and numerous adjacent enlarged solid lymph nodes.  Constellation of findings concerning for neoplastic process such as metastatic squamous cell carcinoma.    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  Service: Vascular Neurology      Recommendations:     Post-discharge complication risks: None    Stroke Education given to: patient    Follow-up in Stroke Clinic in 30 days.     Discharge Plan:  Antithrombotics: Aspirin 81mg  Statin: Atorvastatin 40mg  Aggresive risk factor modification:  Hypertension  High Cholesterol  Diet  Exercise  Obesity    Follow Up:  Follow-up Information     [facility]    Specialty:  Emergency Medicine  Why:  Outpatient Services  Contact information:  [address] [facility] [address] [phone]           [facility] VASCULAR NEUROLOGY In 1 month.    Specialty:  Vascular Neurology  Contact information:  [address] [facility] [id number] [phone]                 Patient Instructions:      Ambulatory consult to ENT   Referral Priority: Routine Referral Type: Consultation   Referral Reason: Specialty Services Required   Referred to Provider: LISBETH WEINSTEIN Requested Specialty: Otolaryngology   Number of Visits Requested: 1     Ambulatory consult to Electrophysiology   Referral Priority: Routine Referral Type: Consultation   Referral Reason: Specialty Services  Required   Requested Specialty: Electrophysiology   Number of Visits Requested: 1       Medications:  Reconciled Home Medications:      Medication List      START taking these medications    acetaminophen 325 MG tablet  Commonly known as:  TYLENOL  Take 2 tablets (650 mg total) by mouth every 6 (six) hours as needed.     aspirin 81 MG EC tablet  Commonly known as:  ECOTRIN  Take 1 tablet (81 mg total) by mouth once daily.  Start taking on:  December 18, 2019     atorvastatin 40 MG tablet  Commonly known as:  LIPITOR  Take 1 tablet (40 mg total) by mouth every evening.     clindamycin 300 MG capsule  Commonly known as:  CLEOCIN  Take 1 capsule (300 mg total) by mouth every 6 (six) hours.     diclofenac sodium 1 % Gel  Commonly known as:  VOLTAREN  Apply 2 g topically 2 (two) times daily.     lisinopril 10 MG tablet  Take 1 tablet (10 mg total) by mouth once daily.  Start taking on:  December 18, 2019     senna-docusate 8.6-50 mg 8.6-50 mg per tablet  Commonly known as:  PERICOLACE  Take 1 tablet by mouth 2 (two) times daily.            Emely Bashir NP  Comprehensive Stroke Center  Department of Vascular Neurology   [facility]    Patient informed

## 2019-12-17 NOTE — PLAN OF CARE
12/17/19 1404   Post-Acute Status   Post-Acute Authorization Placement   Post-Acute Placement Status Set-up Complete       Pt has been accepted by [facility]. Nurse can call report to [phone] and speak with [facility].  Pt going to Room 500 Bed A.  Transport setup with W/C for 3:15. SW in contact with CM and Medical staff.       Javy Montes De Oca LMSW  [facility]   Ext. [phone]

## 2019-12-17 NOTE — PROGRESS NOTES
[facility]  Vascular Neurology  Comprehensive Stroke Center  Progress Note    Assessment/Plan:     * Embolic stroke involving left middle cerebral artery  60 y.o. yo male with unknown past medical history who presented to [place] with AMS s/p fall. LKN ~1130 pm on 12/5. CTA completed at OSH revealed left MCA proximal occlusion. Patient taken to IR s/p successful thrombectomy with TICI 2b flow.  Noted Left MCA distribution infarct with small hemorrhagic conversion in the insula and temporal lobe on MRI Brain. Now with only mild cognitive and language deficits.  Etiology ESUS at this time.   Will plan for EP referral for loop recorder placement at discharge.      Antithrombotics for secondary stroke prevention: ASA 81mg QD  Statins for secondary stroke prevention and hyperlipidemia, if present:   Statins: Atorvastatin- 40 mg daily  Aggressive risk factor modification: HTN, HLD, Diet, Exercise, Obesity  Rehab efforts: The patient has been evaluated by a stroke team provider and the therapy needs have been fully considered based off the presenting complaints and exam findings. The following therapy evaluations are needed: PT evaluate and treat, OT evaluate and treat, SLP evaluate and treat, PM&R evaluate for appropriate placement- Dispo outpatient therapy however electing for [facility] nursing home placement  Diagnostics ordered/pending: none  VTE prophylaxis: Heparin 5000 units SQ every 8 hours; SCDs  BP parameters: Infarct: Post successful thrombectomy, SBP < 160    Cellulitis  - LUE with erythema, tenderness, and firmness to palpation - prior IV site.  - US LUE 12/12 showed partially occlusive thrombosis of the cephalic vein (superficial vein); no DVT  - Initial treatment w/ Bactrim did not result in significant improvement  - Transitioned to Vanc and on Day #4 patient w/ improved symptoms   - Pt transitioned to clindamycin oral for discharge to continue until 12/19/2019    Oropharyngeal  mass  - Pt reports hx R neck/inferior to jaw mass present for the last year or so; denies prior evaluations. He denies hx chewing tobacco abuse. Reports occasional R tonsillar bleeding as well.  - CT Soft Tissue Neck demonstrating mass with multiple adjacent necrotic and large, solid lymph nodes. Concern for neoplastic process. Discussed these findings with patient.  - Curbside to ENT - No plans for acute intervention; outpatient workup. Will order Ambulatory referral to ENT with Dr. Palmer at discharge    Essential hypertension  - Stroke risk factor  - SBP < 160  - BP stable on current regimen     Aphasia due to acute cerebrovascular accident (CVA)  - Now significantly improved   - Continue aggressive SLP  - Some mild difficulty and decrease in speed when reading     Cytotoxic cerebral edema  - Area of cytotoxic cerebral edema identified when reviewing brain imaging in the territory of the left middle cerebral artery. There (is/is not) mass effect associated with it. We will continue to monitor the patients clinical exam for any worsening of symptoms which may indicate expansion of the stroke or the area of the edema resulting in the clinical change  - The pattern is suggestive of ESUS etiology  - Continuing to monitor; doing well    Mixed hyperlipidemia  - Stroke risk factor   -   - Continue Atorvastatin 40 mg         12/08/2019 improvement of expressive aphasia today   12/09/2019: NAEON. BP elevated overnight. Coreg discontinued. Lisinopril started.   12/10/2019: Patient progressing well. PT/OT now recommending home with outpatient therapy. Patient is homeless. CM/SW working to plan safe discharge.   12/11: CT Soft Tissue Neck demonstrating mass; curbside to ENT. [facility] as patient does not have a safe discharge plan.   12/12 - Started on Bactrim DS X 5 days for LUE cellulitis. CM/SW arranging follow up at [place] for evaluation of neck mass.   12/13: LUE with  worsened pain, firmness and spreading erythema; d/c'd Bactrim and started IV Vanc with pharmacy assistance. Labs qOD.  12/14/2019Improvement in pain and appearance of LUE. Patient doing well and pending discharge to NH.   12/15/2019 Erythema pretty much resolved, some hardening remains of the LUE. Will continue Vanc x 2 more days. No other events overnight, pending placement at this time.   12/16 - Continuing on IV Vanc. Erythema remarkably improved. Waiting on acceptance to residential nursing home.   12/17 No new deficits; NIH 0.  Doing well in good spirits.  Plan for transfer to NH today.    STROKE DOCUMENTATION   Acute Stroke Times   Last Known Normal Date: 12/05/19  Last Known Normal Time: 2330  Stroke Team Called Date: 12/06/19  Stroke Team Called Time: 0800  Stroke Team Arrival Date: 12/06/19  Stroke Team Arrival Time: 0804  CT Interpretation Time: 0810  Decision to Treat Time for Alteplase: (N/A)    NIH Scale:  1a. Level of Consciousness: 0-->Alert, keenly responsive  1b. LOC Questions: 0-->Answers both questions correctly  1c. LOC Commands: 0-->Performs both tasks correctly  2. Best Gaze: 0-->Normal  3. Visual: 0-->No visual loss  4. Facial Palsy: 0-->Normal symmetrical movements  5a. Motor Arm, Left: 0-->No drift, limb holds 90 (or 45) degrees for full 10 secs  5b. Motor Arm, Right: 0-->No drift, limb holds 90 (or 45) degrees for full 10 secs  6a. Motor Leg, Left: 0-->No drift, leg holds 30 degree position for full 5 secs  6b. Motor Leg, Right: 0-->No drift, leg holds 30 degree position for full 5 secs  7. Limb Ataxia: 0-->Absent  8. Sensory: 0-->Normal, no sensory loss  9. Best Language: 0-->No aphasia, normal  10. Dysarthria: 0-->Normal  11. Extinction and Inattention (formerly Neglect): 0-->No abnormality  Total (NIH Stroke Scale): 0       Modified [place] Coma Scale:    ABCD2 Score:    [id number] Score:   HAS -BLED Score:   ICH Score:   Hunt & Cook Classification:      Hemorrhagic change of  an Ischemic Stroke: Does this patient have an ischemic stroke with hemorrhagic changes? No     Neurologic Chief Complaint:  L MCA, expressive aphasia     Subjective:     Interval History: Patient is seen for follow-up neurological assessment and treatment recommendations: No acute issues or events overnight.  No new deficits; NIH 0.  Doing well in good spirits.  Plan for transfer to NH today.  Continuing Vanc for cellulitis.  Continues to improve.      HPI, Past Medical, Family, and Social History remains the same as documented in the initial encounter.     Review of Systems   Constitutional: Negative for fatigue and fever.   HENT: Positive for mouth sores (reports occasional bleeding R tonsil). Negative for trouble swallowing.    Skin: Positive for color change and wound.   Neurological: Negative for facial asymmetry, speech difficulty, weakness, light-headedness and numbness.   Psychiatric/Behavioral: Negative for confusion and decreased concentration.     Scheduled Meds:   aspirin  81 mg Oral Daily    atorvastatin  40 mg Oral QHS    diclofenac sodium  2 g Topical (Top) BID    heparin (porcine)  5,000 Units Subcutaneous Q8H    lisinopril  10 mg Oral Daily    senna-docusate 8.6-50 mg  1 tablet Oral BID    vancomycin (VANCOCIN) IVPB  1,500 mg Intravenous Q12H     Continuous Infusions:  PRN Meds:acetaminophen, sodium chloride 0.9%, sodium chloride 0.9%    Objective:     Vital Signs (Most Recent):  Temp: 97.6 °F (36.4 °C) (12/17/19 1101)  Pulse: 66 (12/17/19 1110)  Resp: 18 (12/17/19 1101)  BP: (!) 167/94 (12/17/19 1101)  SpO2: 95 % (12/17/19 1101)  BP Location: Left arm    Vital Signs Range (Last 24H):  Temp:  [97.5 °F (36.4 °C)-98.3 °F (36.8 °C)]   Pulse:  [61-79]   Resp:  [16-20]   BP: (119-167)/(75-94)   SpO2:  [94 %-98 %]   BP Location: Left arm    Physical Exam   Constitutional: He appears well-developed. No distress.   HENT:   Head: Atraumatic.   Cardiovascular: Normal rate.   Pulmonary/Chest: Effort  normal.   Skin: Skin is warm and dry. He is not diaphoretic.   LUE erythema w/ significant improvement. Forearm hardening/cord still present, but also improved.    Psychiatric: He has a normal mood and affect. His behavior is normal.   Vitals reviewed.      Neurological Exam:   LOC: alert  Attention Span: Good   Language: No aphasia  Articulation: No dysarthria  Orientation: Person, Place, Time   Visual Fields: Full  EOM (CN III, IV, VI): Full/intact  Facial Movement (CN VII): Symmetric facial expression    Motor: Arm left  Normal 5/5  Leg left  Normal 5/5  Arm right  Normal 5/5  Leg right Normal 5/5  Sensation: Intact to light touch, temperature and vibration  Tone: Normal tone throughout    Laboratory:  CMP:   No results for input(s): GLUCOSE, CALCIUM, ALBUMIN, PROT, NA, K, CO2, CL, BUN, CREATININE, ALKPHOS, ALT, AST, BILITOT in the last 24 hours.  BMP:   Recent Labs   Lab 12/16/19  0558      K 4.2      CO2 27   BUN 14   CREATININE 0.8   CALCIUM 9.1     CBC:   Recent Labs   Lab 12/16/19  0558   WBC 7.06   RBC 4.24*   HGB 12.8*   HCT 38.7*      MCV 91   MCH 30.2   MCHC 33.1     Lipid Panel:   No results for input(s): CHOL, LDLCALC, HDL, TRIG in the last 168 hours.  Coagulation:   No results for input(s): PT, INR, APTT in the last 168 hours.  Platelet Aggregation Study: No results for input(s): PLTAGG, PLTAGINTERP, PLTAGREGLACO, ADPPLTAGGREG in the last 168 hours.  Hgb A1C:   No results for input(s): HGBA1C in the last 168 hours.  TSH:   No results for input(s): TSH in the last 168 hours.      Diagnostic Results     Brain imaging:      CT Head 12/8/19  1. Evolving left MCA distribution infarct with superimposed petechial hemorrhage and mild mass effect resulting in minimal rightward midline shift of 1 mm.    MRI Brain 12/07/2019    1. Left MCA distribution infarct with hemorrhagic conversion in the insula and temporal lobe.  Associated localized mass effect without midline shift.  2. Chronic  microvascular ischemic change.    CT Head 12/06/2019    Ill-defined regions of decreased attenuation left MCA territory specifically left insula, left posterior temporoparietal cortex and lateral aspect of the left lentiform nucleus most compatible with acute areas of infarction.  There is no significant mass effect or midline shift.  No evidence for hemorrhagic conversion.  There is slight hyperdensity associated with the intracranial vasculature likely related to recent contrast administration for outside institution angiography       Vessel Imaging     US UE Left 12/11/19  Partially occlusive thrombosis of the cephalic vein (superficial vein).  No evidence of deep venous thrombosis.    Cerebral Angiogram 12/6/19  Technically successful aspiration thrombectomy of the left MCA distal M1 segment artery with resulting TICI 2b flow.       Cardiac Evaluation:     TTE 12/6/19  · Normal left ventricular systolic function. The estimated ejection fraction is 65%  · Eccentric left ventricular hypertrophy.  · Indeterminate left ventricular diastolic function.  · Normal right ventricular systolic function.  · Mild aortic valve stenosis. peak velocity is 2.94 m/s; mean gradient is 20 mmHg. ABBIE cannot be measured accurately.  · Normal central venous pressure (3 mm Hg).  · Mild mitral regurgitation.  · No wall motion abnormalities.         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  Abnormal conglomerate of level II/III lymph nodes with necrotic centers with surrounding inflammatory stranding and numerous adjacent enlarged solid lymph nodes.  Constellation of findings concerning for neoplastic process such as metastatic squamous cell carcinoma.      Emely Bashir NP  Comprehensive Stroke Center  Department of Vascular Neurology   [facility]

## 2019-12-17 NOTE — PLAN OF CARE
12/17/19 1420   Final Note   Assessment Type Final Discharge Note   Anticipated Discharge Disposition skilled nursing Nu   Hospital Follow Up  Appt(s) scheduled? Yes   Discharge plans and expectations educations in teach back method with documentation complete? Yes   Right Care Referral Info   Post Acute Recommendation No Care   Referral Type skilled nursing Nursing Home   Facility Name [address]    [place], [place]

## 2019-12-17 NOTE — PLAN OF CARE
Problem: SLP Goal  Goal: SLP Goal  Description  Speech Language Pathology Goals  Goals expected to be met by 12/23  1. Pt will tolerate dental soft diet with thin liquids with adequate oral clearance and no overt S/S aspiration, MOD I.  2.  Pt will name common objects with 75% accuracy or higher, MIN A.  3. Pt will answer complex YNQ with 90% accuracy, MIN A.  4. Pt will follow complexcommands with 90% accuracy, MIN A.  5. Pt will participate in further assessment of cognition as appropriate.  6. Further educate Pt and family on safety awareness and compensatory strategies for fx communication .    Speech Language Pathology Goals  Goals expected to be met by 12/14/19  1. Pt will tolerate dental soft diet with thin liquids with adequate oral clearance and no overt S/S aspiration, MOD I- ongoing  2. Pt will complete automatic speech tasks with 90% accuracy, MIN A- met  3. Pt will name common objects with 75% accuracy or higher, MIN A- ongoing  4. Pt will answer YNQ with 90% accuracy, MIN A- met for simple y/n   5. Pt will follow simple commands with 90% accuracy, MIN A- met   6. Pt will participate in further assessment of cognition, reading and writing - met for reading/writing  7. Educate Pt and family on safety awareness and compensatory strategies for fx communication  - ongoing      Outcome: Ongoing, Progressing    Goals remain appropriate.     MONICO Bourne, CCC-SLP  12/17/2019

## 2020-03-10 ENCOUNTER — RESEARCH ENCOUNTER (OUTPATIENT)
Dept: NEUROLOGY | Facility: CLINIC | Age: 60
End: 2020-03-10

## 2020-03-17 ENCOUNTER — RESEARCH ENCOUNTER (OUTPATIENT)
Dept: NEUROLOGY | Facility: CLINIC | Age: 60
End: 2020-03-17

## 2020-03-27 NOTE — PROGRESS NOTES
[facility]  PI: Janes Gaona MD  : Tammie Ac  Subject# [id number]  3 month visit         Subject's nursing facility did not set up transportation for subject after several phone calls made to the facility by me confirming the subject's appointment. I have reached to the nurse in charge of the subject, however I was unsuccessful in speaking directly to the subject. Per nurse Jordyn at [facility], the subject is doing well. No complaints related to sleep issues, or no new stroke like symptoms. Subject is taking medications as prescribed. I will contact the nursing facility again in hopes of speaking directly with the subject.

## 2020-03-28 NOTE — PROGRESS NOTES
[facility]  [facility] Janes Gaona MD   : Tammie Ac  Subject #[id number]        Called and spoke with nurse Cobb in hopes of speaking with the subject. Per nurse Jordyn the facility is on lock down due to COVID-19 and that she would try to have the subject return my call. Jordyn asked me to text her my contact information, which I did. Per Jordyn the subject remains free of new stroke like symptoms or sleep issues. Per Jordyn the subject is taking medications as prescribed.

## 2020-05-14 ENCOUNTER — RESEARCH ENCOUNTER (OUTPATIENT)
Dept: NEUROLOGY | Facility: CLINIC | Age: 60
End: 2020-05-14

## 2020-05-14 NOTE — PROGRESS NOTES
[facility]   PI: DOMINICK Gaona MD   : Tammie Ac  Subject #[id number]  3 month visit     Call the subject's nursing home [facility] on 4/17/20 (placed on hold for the subject or nurse, no response)  Called the subject's nursing home [facility] on 4/21/20 (placed on hold for the subject or nurse, no response)    Called [facility] on today 5/14/20 and was told that the subject was discharged on 4/1/20 and re-admitted to [facility].  Called [facility] on today 5/14/20 and was told by the subject's nurse that he was resting due to receiving a medication for throat pain and is unable to speak with me at this time.     Will follow up again

## 2020-05-26 ENCOUNTER — RESEARCH ENCOUNTER (OUTPATIENT)
Dept: NEUROLOGY | Facility: CLINIC | Age: 60
End: 2020-05-26

## 2020-05-26 NOTE — PROGRESS NOTES
[facility]   PI: DOMINICK Gaona MD   : Tammie Ac  Subject# [id number]  3 month         Called subject's nursing home on today in hopes of speaking with the subject. Per nurse the subject was resting and inavaible. I did ask about scheduling a time to call and speak with the subject to complete study questionnaires. The nurse took my contact number down and said she would all me back after she confirm the subject's availability. Per nurse the best time to call is early mornings or late afternoon. Nurse reports that the subject continues to take stroke prevention medications as prescribed. Subject has not experienced any new stroke or cardiovascular symptoms.

## 2020-06-09 ENCOUNTER — RESEARCH ENCOUNTER (OUTPATIENT)
Dept: NEUROLOGY | Facility: CLINIC | Age: 60
End: 2020-06-09

## 2020-06-09 NOTE — PROGRESS NOTES
[facility]  PI: DOMINICK Gaona MD   : Tammie Ac  Subject# [id number]        Called subject's nursing home on today to attempt the subject's 3 month study visit, however subject and nurse were not available. My callback information was provided to the .

## 2020-07-17 ENCOUNTER — OFFICE VISIT (OUTPATIENT)
Dept: NEUROLOGY | Facility: CLINIC | Age: 60
End: 2020-07-17
Payer: MEDICAID

## 2020-07-17 ENCOUNTER — RESEARCH ENCOUNTER (OUTPATIENT)
Dept: RESEARCH | Facility: HOSPITAL | Age: 60
End: 2020-07-17

## 2020-07-17 ENCOUNTER — RESEARCH ENCOUNTER (OUTPATIENT)
Dept: RESEARCH | Facility: HOSPITAL | Age: 60
End: 2020-07-17
Payer: MEDICAID

## 2020-07-17 VITALS
HEART RATE: 86 BPM | SYSTOLIC BLOOD PRESSURE: 98 MMHG | HEIGHT: 69 IN | BODY MASS INDEX: 23.02 KG/M2 | DIASTOLIC BLOOD PRESSURE: 64 MMHG | WEIGHT: 155.44 LBS

## 2020-07-17 DIAGNOSIS — I10 ESSENTIAL HYPERTENSION: ICD-10-CM

## 2020-07-17 DIAGNOSIS — E78.2 MIXED HYPERLIPIDEMIA: ICD-10-CM

## 2020-07-17 DIAGNOSIS — Z00.6 RESEARCH STUDY PATIENT: Primary | ICD-10-CM

## 2020-07-17 DIAGNOSIS — R47.01 APHASIA DUE TO ACUTE CEREBROVASCULAR ACCIDENT (CVA): ICD-10-CM

## 2020-07-17 DIAGNOSIS — I63.9 APHASIA DUE TO ACUTE CEREBROVASCULAR ACCIDENT (CVA): ICD-10-CM

## 2020-07-17 DIAGNOSIS — Z86.73 HISTORY OF ISCHEMIC LEFT MCA STROKE: ICD-10-CM

## 2020-07-17 DIAGNOSIS — G47.33 OBSTRUCTIVE SLEEP APNEA: ICD-10-CM

## 2020-07-17 PROBLEM — G93.6 CYTOTOXIC CEREBRAL EDEMA: Status: RESOLVED | Noted: 2019-12-06 | Resolved: 2020-07-17

## 2020-07-17 PROBLEM — I63.412 EMBOLIC STROKE INVOLVING LEFT MIDDLE CEREBRAL ARTERY: Status: RESOLVED | Noted: 2019-12-06 | Resolved: 2020-07-17

## 2020-07-17 PROCEDURE — 99215 PR OFFICE/OUTPT VISIT, EST, LEVL V, 40-54 MIN: ICD-10-PCS | Mod: S$PBB,,, | Performed by: PSYCHIATRY & NEUROLOGY

## 2020-07-17 PROCEDURE — 99999 PR PBB SHADOW E&M-EST. PATIENT-LVL III: ICD-10-PCS | Mod: PBBFAC,,, | Performed by: PSYCHIATRY & NEUROLOGY

## 2020-07-17 PROCEDURE — 99215 OFFICE O/P EST HI 40 MIN: CPT | Mod: S$PBB,,, | Performed by: PSYCHIATRY & NEUROLOGY

## 2020-07-17 PROCEDURE — 99999 PR PBB SHADOW E&M-EST. PATIENT-LVL III: CPT | Mod: PBBFAC,,, | Performed by: PSYCHIATRY & NEUROLOGY

## 2020-07-17 PROCEDURE — 99213 OFFICE O/P EST LOW 20 MIN: CPT | Mod: PBBFAC | Performed by: PSYCHIATRY & NEUROLOGY

## 2020-07-17 NOTE — PROGRESS NOTES
[facility]   PI: DOMINICK Gaona MD   : Tammie Ac  Subject #[id number]  6 month visit     Subject was present in clinic on today for the 6 month study visit. The subject informed the study team of a recent hospital admission to [facility] for low blood pressure. Will report this as an SHINE in the EDC. Subject has signed a release of information form to have records released from [facility]. A right orophargnageal mass was found during stroke admission and found to be cancerous. Subject underwent radiation and is now undergoing chemo therapy twice per week. Will request a list of current drugs from subject's nursing home. Subject was randomized to the non treatment arm. Subject has been referred to the sleep clinic for management of MAGGY.         Procedures performed:   Vitals (3 time per study protocol)  All required 6 month study assessments   NIH- performed by PI   MRS- performed by PI  SHINE/AE interrogation

## 2020-07-17 NOTE — PROGRESS NOTES
SleepSMART Study      Impression:  1. History of L MCA (M1) ESUS 12/5/19  [facility] MAGGY untreated arm    Stroke risk factors: stroke, HTN, dyslipidemia, MAGGY    Plan:  1. Study visit completed  2. Confirm/continue ASA 81mg/d and atorvastatin 40mg/d; we will request meds from NH  3. Standard of care orders  · FLP, LFTs  · 30 day cardiac monitor  · Refer to Sleep Disorders  4. RTC 3 months or sooner, prn      Problem List Items Addressed This Visit        1 - High    Research study patient - Primary    Obstructive sleep apnea    Relevant Orders    Ambulatory referral/consult to Sleep Disorders    History of ischemic left MCA stroke    Overview     L M1 ESUS 12/5/19 s/p thrombectomy         Relevant Orders    Cardiac event monitor    Aphasia due to acute cerebrovascular accident (CVA)       2     Mixed hyperlipidemia    Relevant Orders    Lipid Panel    Hepatic function panel    Essential hypertension          CC:  SleepART Study/stroke    HPI:  58 [facility]u.  He had L MCA ESUS in Dec '19.  He was discharged on ASA 81mg/d and atorvastatin and was supposed to have a 30 day cardiac monitor but this wasn't done.  He did not return for f/u standard of care or research visits until today. [facility].  He reports complete stroke recovery and no recurrent stroke symptoms.  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  He has undergone chemo and XRT.  Recently, he was admitted for hypotension.    Past Medical History:   Diagnosis Date    Aphasia due to acute cerebrovascular accident (CVA) 12/6/2019    Embolic stroke involving left middle cerebral artery 12/6/2019    Mixed hyperlipidemia 12/6/2019      Past Surgical History:   Procedure Laterality Date    CEREBRAL ANGIOGRAM N/A 12/6/2019    Procedure: ANGIOGRAM-CEREBRAL;  Surgeon: Matt Moreno MD;   "Location: [facility];  Service: Neurosurgery;  Laterality: N/A;      No outpatient medications have been marked as taking for the 7/17/20 encounter (Office Visit) with Janes Gaona MD.      Review of patient's allergies indicates:  No Known Allergies   History reviewed. No pertinent family history.   Social History     Socioeconomic History    Marital status: Single     Spouse name: Not on file    Number of children: Not on file    Years of education: Not on file    Highest education level: Not on file   Occupational History    Not on file   Social Needs    Financial resource strain: Not on file    Food insecurity     Worry: Not on file     Inability: Not on file    Transportation needs     Medical: Not on file     Non-medical: Not on file   Tobacco Use    Smoking status: Unknown If Ever Smoked   Substance and Sexual Activity    Alcohol use: Not on file    Drug use: Not on file    Sexual activity: Not on file   Lifestyle    Physical activity     Days per week: Not on file     Minutes per session: Not on file    Stress: Not on file   Relationships    Social connections     Talks on phone: Not on file     Gets together: Not on file     Attends [employer] service: Not on file     Active member of club or organization: Not on file     Attends meetings of clubs or organizations: Not on file     Relationship status: Not on file   Other Topics Concern    Not on file   Social History Narrative    Not on file     Review Of Systems:  General: Negative for fever   HENT: Negative for tinnitus, nose bleeds, neck stiffness   Cardiac Negative for palpitations   Vascular: Negative for easy bruising   Pulmonary: Negative for cough   Gastrointestinal: +nausea   Urinary: Negative for incomplete bladder emptying   Musculoskeletal: Negative for muscle aches   Neurological: As above. Otherwise negative for abnormal movements   Psychiatric:  Negative for depression       BP 98/64   Pulse 86   Ht 5' 9" (1.753 m)   " Wt 70.5 kg (155 lb 6.8 oz)   BMI 22.95 kg/m²    Well developed, well nourished male  Extremities: no edema    Mental status:   Awake, alert and appropriately oriented   Normal recent and remote memory   Normal attention and concentration   Normal speech and language   Normal fund of knowledge   No extinction  Cranial nerves:   EOMF without nystagmus   VFF   Normal facial sensation   Normal facial movements   Intact hearing   Motor:   No pronator drift   Normal FF movements bilaterally   Normal muscle tone, bulk and power   No abnormal movements  Sensory   Intact to LT  DTRs   NT  Coordination   Intact to FNF and HTS  Gait   Normal base and gait    NIHSS = 0; mRS = 2 (snf NH)    Data Reviewed:  D/C Summary  Lab Results   Component Value Date    LDLCALC 144.0 12/06/2019     Janes Gaona MD

## 2022-05-18 NOTE — PROGRESS NOTES
[facility]   PI: DOMINICK Gaona MD   : Tammie Ac   Subject# [id number]  Baseline       Procedures performed:   Demographics: completed   Stroke risk factors: completed   Med history: completed   Vitals: completed   STOP-BANG: competed   IQCODE: no proxy available   Pre-stroke MRS: 0 (per subject)   HADS-D: completed   [place] Sleepiness Scale:  NIHSS:  1 (performed by PI)

## 2023-02-16 ENCOUNTER — DOCUMENTATION ONLY (OUTPATIENT)
Dept: RESEARCH | Facility: HOSPITAL | Age: 63
End: 2023-02-16
Payer: MEDICAID

## 2023-02-16 NOTE — PROGRESS NOTES
Sponsor: [employer]  Study Title: Sleep for Stoke Management And Recovery Trial (Sleep SMART)  Sponsor's Protocol #: [id number]      Principle Investigator: Janes Gaona MD   IRB# [id number]   IRB Approval Date: 17MAY2019    Study Visit Purpose: ClinCard Payment  Date: 15FEB2023  Subject #: [id number]    Patient contacted via telephone regarding delayed compensation for their participation in the study in 2019/2020.  Patient phone on file was not in service, and no voicemail could be left.  Another attempt to contact the patient will occur at a later date.

## 2023-03-24 ENCOUNTER — DOCUMENTATION ONLY (OUTPATIENT)
Dept: RESEARCH | Facility: HOSPITAL | Age: 63
End: 2023-03-24
Payer: MEDICAID

## 2023-03-24 NOTE — PROGRESS NOTES
Sponsor: [employer]  Study Title: Sleep for Stroke Management And Recovery Trial (Sleep SMART)  Sponsor's Protocol #: [id number]  Subject ID: [id number]      Principle Investigator: Janes Gaona MD   IRB# [id number]   IRB Approval Date: 17MAY2019    TIMELINE  [id number] - ICF  [id number] - Baseline/MAGGY Testing  [id number] - Run-In Night Trial 1  [id number] - Randomization  [id number] - 6 Month Follow-Up    Study Purpose: ClinCard Payment  Date: 24MAR2023    CRC made a third and final attempt to contact the patient via telephone, but unsuccessful.  Unable to leave a voice message as voicemail box was full.  Patient's ClinCard will be sent to the address on file.

## 2023-03-28 NOTE — ASSESSMENT & PLAN NOTE
-stroke risk factor         Recommend Atorvastatin 40 mg    No contraindications from an EP perspective for him to proceed with dental procedure